# Patient Record
Sex: FEMALE | Race: WHITE | Employment: FULL TIME | URBAN - METROPOLITAN AREA
[De-identification: names, ages, dates, MRNs, and addresses within clinical notes are randomized per-mention and may not be internally consistent; named-entity substitution may affect disease eponyms.]

---

## 2017-06-14 ENCOUNTER — TRANSFERRED RECORDS (OUTPATIENT)
Dept: HEALTH INFORMATION MANAGEMENT | Facility: CLINIC | Age: 57
End: 2017-06-14

## 2017-07-18 NOTE — PROGRESS NOTES
"  SUBJECTIVE:                                                    Kerri Timmons is a 56 year old female who presents to clinic today for the following health issues:    Hypertension Follow-up      Outpatient blood pressures are being checked at home and work.  Results are 150's.    Low Salt Diet: not monitoring salt      Amount of exercise or physical activity: 2-3 days/week for an average of greater than 60 minutes    Problems taking medications regularly: Yes,  side effects    Medication side effects: none    Diet: regular (no restrictions)    Poor diet, lots of processed foods.  Very little exercise.    Has been out of medications for several months.   Denies chest pain, BOGGS, SOB, dizziness or lightheadedness.  No pain radiating to left arm or jaw.  No reflux.      Problem list and histories reviewed & adjusted, as indicated.  Additional history: as documented    Reviewed and updated as needed this visit by clinical staff  Tobacco  Allergies  Meds  Problems  Med Hx  Surg Hx  Fam Hx  Soc Hx        Reviewed and updated as needed this visit by Provider  Allergies  Meds  Problems  Med Hx  Surg Hx  Fam Hx         ROS:  Constitutional, HEENT, cardiovascular, pulmonary, GI, , musculoskeletal, neuro, skin, endocrine and psych systems are negative, except as otherwise noted.      OBJECTIVE:   BP (!) 153/97 (BP Location: Left arm, Patient Position: Sitting, Cuff Size: Adult Regular)  Pulse 62  Temp 95.7  F (35.4  C) (Tympanic)  Resp 16  Ht 5' 3.5\" (1.613 m)  Wt 143 lb 9.6 oz (65.1 kg)  LMP 06/01/2017  SpO2 99%  BMI 25.04 kg/m2  Body mass index is 25.04 kg/(m^2).  GENERAL: healthy, alert and no distress  RESP: lungs clear to auscultation - no rales, rhonchi or wheezes  CV: regular rate and rhythm, normal S1 S2, no S3 or S4, no murmur, click or rub, no peripheral edema and peripheral pulses strong  MS: no gross musculoskeletal defects noted, no edema  SKIN: no suspicious lesions or rashes  PSYCH: " mentation appears normal, affect normal/bright    Diagnostic Test Results:  Results for orders placed or performed in visit on 07/19/17   Comprehensive metabolic panel   Result Value Ref Range    Sodium 139 133 - 144 mmol/L    Potassium 3.6 3.4 - 5.3 mmol/L    Chloride 104 94 - 109 mmol/L    Carbon Dioxide 27 20 - 32 mmol/L    Anion Gap 8 3 - 14 mmol/L    Glucose 125 (H) 70 - 99 mg/dL    Urea Nitrogen 11 7 - 30 mg/dL    Creatinine 0.76 0.52 - 1.04 mg/dL    GFR Estimate 78 >60 mL/min/1.7m2    GFR Estimate If Black >90   GFR Calc   >60 mL/min/1.7m2    Calcium 9.1 8.5 - 10.1 mg/dL    Bilirubin Total 0.8 0.2 - 1.3 mg/dL    Albumin 3.9 3.4 - 5.0 g/dL    Protein Total 7.8 6.8 - 8.8 g/dL    Alkaline Phosphatase 100 40 - 150 U/L    ALT 37 0 - 50 U/L    AST 29 0 - 45 U/L   Albumin Random Urine Quantitative   Result Value Ref Range    Creatinine Urine 277 mg/dL    Albumin Urine mg/L 17 mg/L    Albumin Urine mg/g Cr 5.99 0 - 25 mg/g Cr       ASSESSMENT/PLAN:     Hypertension; slightly worsened   Associated with the following complications:    None   Plan:  Restart toprol at 25 mg and recheck in 1 month.  No changes in the patient's current treatment plan                ICD-10-CM    1. Paroxysmal supraventricular tachycardia (H) I47.1 metoprolol (TOPROL-XL) 25 MG 24 hr tablet     Comprehensive metabolic panel     Albumin Random Urine Quantitative   2. Hypertension goal BP (blood pressure) < 140/90 I10 metoprolol (TOPROL-XL) 25 MG 24 hr tablet     Comprehensive metabolic panel     Albumin Random Urine Quantitative   3. Major depression in complete remission (H) F32.5        See Patient Instructions    KELVIN Gallagher CNP  [facility]

## 2017-07-19 ENCOUNTER — OFFICE VISIT (OUTPATIENT)
Dept: FAMILY MEDICINE | Facility: CLINIC | Age: 57
End: 2017-07-19
Payer: COMMERCIAL

## 2017-07-19 VITALS
HEIGHT: 64 IN | RESPIRATION RATE: 16 BRPM | BODY MASS INDEX: 24.52 KG/M2 | TEMPERATURE: 95.7 F | HEART RATE: 62 BPM | WEIGHT: 143.6 LBS | DIASTOLIC BLOOD PRESSURE: 97 MMHG | SYSTOLIC BLOOD PRESSURE: 153 MMHG | OXYGEN SATURATION: 99 %

## 2017-07-19 DIAGNOSIS — F32.5 MAJOR DEPRESSION IN COMPLETE REMISSION (H): ICD-10-CM

## 2017-07-19 DIAGNOSIS — I47.10 PAROXYSMAL SUPRAVENTRICULAR TACHYCARDIA (H): Primary | ICD-10-CM

## 2017-07-19 DIAGNOSIS — I10 HYPERTENSION GOAL BP (BLOOD PRESSURE) < 140/90: ICD-10-CM

## 2017-07-19 PROBLEM — H26.9 CATARACT, RIGHT EYE: Status: ACTIVE | Noted: 2017-06-17

## 2017-07-19 LAB
ALBUMIN SERPL-MCNC: 3.9 G/DL (ref 3.4–5)
ALP SERPL-CCNC: 100 U/L (ref 40–150)
ALT SERPL W P-5'-P-CCNC: 37 U/L (ref 0–50)
ANION GAP SERPL CALCULATED.3IONS-SCNC: 8 MMOL/L (ref 3–14)
AST SERPL W P-5'-P-CCNC: 29 U/L (ref 0–45)
BILIRUB SERPL-MCNC: 0.8 MG/DL (ref 0.2–1.3)
BUN SERPL-MCNC: 11 MG/DL (ref 7–30)
CALCIUM SERPL-MCNC: 9.1 MG/DL (ref 8.5–10.1)
CHLORIDE SERPL-SCNC: 104 MMOL/L (ref 94–109)
CO2 SERPL-SCNC: 27 MMOL/L (ref 20–32)
CREAT SERPL-MCNC: 0.76 MG/DL (ref 0.52–1.04)
CREAT UR-MCNC: 277 MG/DL
GFR SERPL CREATININE-BSD FRML MDRD: 78 ML/MIN/1.7M2
GLUCOSE SERPL-MCNC: 125 MG/DL (ref 70–99)
MICROALBUMIN UR-MCNC: 17 MG/L
MICROALBUMIN/CREAT UR: 5.99 MG/G CR (ref 0–25)
POTASSIUM SERPL-SCNC: 3.6 MMOL/L (ref 3.4–5.3)
PROT SERPL-MCNC: 7.8 G/DL (ref 6.8–8.8)
SODIUM SERPL-SCNC: 139 MMOL/L (ref 133–144)

## 2017-07-19 PROCEDURE — 36415 COLL VENOUS BLD VENIPUNCTURE: CPT | Performed by: NURSE PRACTITIONER

## 2017-07-19 PROCEDURE — 82043 UR ALBUMIN QUANTITATIVE: CPT | Performed by: NURSE PRACTITIONER

## 2017-07-19 PROCEDURE — 80053 COMPREHEN METABOLIC PANEL: CPT | Performed by: NURSE PRACTITIONER

## 2017-07-19 PROCEDURE — 99213 OFFICE O/P EST LOW 20 MIN: CPT | Performed by: NURSE PRACTITIONER

## 2017-07-19 RX ORDER — METOPROLOL SUCCINATE 25 MG/1
25 TABLET, EXTENDED RELEASE ORAL DAILY
Qty: 30 TABLET | Refills: 0 | Status: SHIPPED | OUTPATIENT
Start: 2017-07-19 | End: 2017-07-31

## 2017-07-19 NOTE — MR AVS SNAPSHOT
"              After Visit Summary   7/19/2017    Kerri Timmons    MRN: [id number]           Patient Information     Date Of Birth          1960        Visit Information        Provider Department      7/19/2017 10:20 AM Ying Holder APRN CNP [facility]        Today's Diagnoses     Paroxysmal supraventricular tachycardia (H)    -  1    Hypertension goal BP (blood pressure) < 140/90        Major depression in complete remission (H)           Follow-ups after your visit        Who to contact     If you have questions or need follow up information about today's clinic visit or your schedule please contact [facility] directly at [phone].  Normal or non-critical lab and imaging results will be communicated to you by [employer], letter or phone within 4 business days after the clinic has received the results. If you do not hear from us within 7 days, please contact the clinic through [employer] or phone. If you have a critical or abnormal lab result, we will notify you by phone as soon as possible.  Submit refill requests through [employer] or call your pharmacy and they will forward the refill request to us. Please allow 3 business days for your refill to be completed.          Additional Information About Your Visit        MyChart Information     [employer] gives you secure access to your electronic health record. If you see a primary care provider, you can also send messages to your care team and make appointments. If you have questions, please call your primary care clinic.  If you do not have a primary care provider, please call [phone] and they will assist you.        Care EveryWhere ID     This is your Care EveryWhere ID. This could be used by other organizations to access your [place] medical records  [id number]        Your Vitals Were     Pulse Temperature Respirations Height Last Period Pulse Oximetry    62 95.7  F (35.4  C) (Tympanic) 16 5' 3.5\" (1.613 m) " 06/01/2017 99%    BMI (Body Mass Index)                   25.04 kg/m2            Blood Pressure from Last 3 Encounters:   07/19/17 (!) 153/97   08/16/16 (!) 143/93   10/29/15 (!) 143/114    Weight from Last 3 Encounters:   07/19/17 143 lb 9.6 oz (65.1 kg)   08/16/16 146 lb (66.2 kg)   10/29/15 140 lb (63.5 kg)              We Performed the Following     Albumin Random Urine Quantitative     Comprehensive metabolic panel          Today's Medication Changes          These changes are accurate as of: 7/19/17 11:59 PM.  If you have any questions, ask your nurse or doctor.               Start taking these medicines.        Dose/Directions    metoprolol 25 MG 24 hr tablet   Commonly known as:  TOPROL-XL   Used for:  Paroxysmal supraventricular tachycardia (H), Hypertension goal BP (blood pressure) < 140/90   Started by:  Ying Holder APRN CNP        Dose:  25 mg   Take 1 tablet (25 mg) by mouth daily   Quantity:  30 tablet   Refills:  0            Where to get your medicines      These medications were sent to [facility]/pharmacy #[id number] - [place], [place] - [address], [place] [address]     Phone:  [phone]     metoprolol 25 MG 24 hr tablet                Primary Care Provider Office Phone # Fax #    Mira DAGO Draper [phone]       [facility] [address] [facility] [address]        Equal Access to Services     ACE SWAN AH: Hadii amirah ku hadasho Soomaali, waaxda luqadaha, qaybta kaalmada adeegyada, waxay jules romero adecameron daniel. So [facility] [phone].    ATENCIÓN: Si habla español, tiene a davis disposición servicios gratuitos de asistencia lingüística. Llame al [phone].    We comply with applicable federal civil rights laws and [place] laws. We do not discriminate on the basis of race, color, national origin, age, disability sex, sexual orientation or gender identity.            Thank you!     Thank you for choosing  [facility]  for your care. Our goal is always to provide you with excellent care. Hearing back from our patients is one way we can continue to improve our services. Please take a few minutes to complete the written survey that you may receive in the mail after your visit with us. Thank you!             Your Updated Medication List - Protect others around you: Learn how to safely use, store and throw away your medicines at www.disposemymeds.org.          This list is accurate as of: 7/19/17 11:59 PM.  Always use your most recent med list.                   Brand Name Dispense Instructions for use Diagnosis    atenolol 25 MG tablet    TENORMIN    90 tablet    Take 1 tablet (25 mg) by mouth daily    Paroxysmal supraventricular tachycardia (H)       calcium carbonate 1250 MG tablet    OS-FADY 500 mg [place]. Ca    100 tablet    Take 1 tablet by mouth daily.        DULoxetine 60 MG EC capsule    CYMBALTA    90 capsule    Take 1 capsule (60 mg) by mouth daily    Major depression in complete remission (H)       metoprolol 25 MG 24 hr tablet    TOPROL-XL    30 tablet    Take 1 tablet (25 mg) by mouth daily    Paroxysmal supraventricular tachycardia (H), Hypertension goal BP (blood pressure) < 140/90       Multi-vitamin Tabs tablet   Generic drug:  multivitamin, therapeutic with minerals     100    as directed    Routine general medical examination at a health care facility

## 2017-07-19 NOTE — NURSING NOTE
"Chief Complaint   Patient presents with     Hypertension       Initial BP (!) 153/97 (BP Location: Left arm, Patient Position: Sitting, Cuff Size: Adult Regular)  Pulse 62  Temp 95.7  F (35.4  C) (Tympanic)  Resp 16  Ht 5' 3.5\" (1.613 m)  Wt 143 lb 9.6 oz (65.1 kg)  LMP 06/01/2017  SpO2 99%  BMI 25.04 kg/m2 Estimated body mass index is 25.04 kg/(m^2) as calculated from the following:    Height as of this encounter: 5' 3.5\" (1.613 m).    Weight as of this encounter: 143 lb 9.6 oz (65.1 kg).  Medication Reconciliation: complete     sma Esteban  "

## 2017-07-25 ENCOUNTER — TELEPHONE (OUTPATIENT)
Dept: FAMILY MEDICINE | Facility: CLINIC | Age: 57
End: 2017-07-25

## 2017-07-26 ASSESSMENT — PATIENT HEALTH QUESTIONNAIRE - PHQ9: SUM OF ALL RESPONSES TO PHQ QUESTIONS 1-9: 0

## 2017-07-27 ENCOUNTER — MYC MEDICAL ADVICE (OUTPATIENT)
Dept: FAMILY MEDICINE | Facility: CLINIC | Age: 57
End: 2017-07-27

## 2017-07-27 DIAGNOSIS — I10 HYPERTENSION GOAL BP (BLOOD PRESSURE) < 140/90: ICD-10-CM

## 2017-07-27 DIAGNOSIS — I47.10 PAROXYSMAL SUPRAVENTRICULAR TACHYCARDIA (H): ICD-10-CM

## 2017-07-28 ENCOUNTER — TELEPHONE (OUTPATIENT)
Dept: FAMILY MEDICINE | Facility: CLINIC | Age: 57
End: 2017-07-28

## 2017-07-28 NOTE — TELEPHONE ENCOUNTER
Huddled with provider SB and patient really needs to come in for a BP check and evaluation of BP meds.  LMOM strongly encouraging her to come in as she is at a higher risk for CV sx.  Does have an appt. But not sure if she will keep it.  Await to hear from her.   Gricelda Berry RN

## 2017-07-28 NOTE — TELEPHONE ENCOUNTER
Reason for Call:  Medication or medication refill:    Do you use a [facility]?  Name of the pharmacy and phone number for the current request:  [facility]/PHARMACY #[id number] - [place], [place] - [address]    Name of the medication requested: metoprolol (TOPROL-XL) 25 MG 24 hr tablet    Other request: Patient is requesting a call back to know if she is able to take one and a half tablets this morning because one is not helping, or if she should take one this morning and a half tablet this evening. Please follow up with clarity. Thanks!    Can we leave a detailed message on this number? YES    Phone number patient can be reached at: Home number on file [phone] (home)    Best Time: Any    Call taken on 7/28/2017 at 10:03 AM by Margaret Pappas

## 2017-07-28 NOTE — TELEPHONE ENCOUNTER
Attempted again to reach patient and LMOM. Unable to reach right now to confirm if she can come to appt. Strongly encouraged her.   Gricelda Berry RN

## 2017-07-31 RX ORDER — METOPROLOL SUCCINATE 50 MG/1
50 TABLET, EXTENDED RELEASE ORAL DAILY
Qty: 30 TABLET | Refills: 0 | Status: SHIPPED | OUTPATIENT
Start: 2017-07-31 | End: 2017-08-22 | Stop reason: ALTCHOICE

## 2017-08-14 ENCOUNTER — OFFICE VISIT (OUTPATIENT)
Dept: FAMILY MEDICINE | Facility: CLINIC | Age: 57
End: 2017-08-14
Payer: COMMERCIAL

## 2017-08-14 VITALS
DIASTOLIC BLOOD PRESSURE: 84 MMHG | RESPIRATION RATE: 18 BRPM | WEIGHT: 148 LBS | HEART RATE: 67 BPM | SYSTOLIC BLOOD PRESSURE: 127 MMHG | BODY MASS INDEX: 25.81 KG/M2 | OXYGEN SATURATION: 96 % | TEMPERATURE: 98.1 F

## 2017-08-14 DIAGNOSIS — I10 HYPERTENSION GOAL BP (BLOOD PRESSURE) < 140/90: Primary | ICD-10-CM

## 2017-08-14 PROCEDURE — 99213 OFFICE O/P EST LOW 20 MIN: CPT | Performed by: FAMILY MEDICINE

## 2017-08-14 NOTE — MR AVS SNAPSHOT
After Visit Summary   8/14/2017    Kerri Timmons    MRN: [id number]           Patient Information     Date Of Birth          1960        Visit Information        Provider Department      8/14/2017 11:45 AM Master Angeles MD [facility]        Today's Diagnoses     Hypertension goal BP (blood pressure) < 140/90    -  1      Care Instructions    Let's plan on checking BP 2-3 times a month, can be a nurse visit. Check BP with home monitor and we can compare this to our bp cuffs by bringing it in to your visit with Jasmine.  I do not recommend a change today.          Follow-ups after your visit        Your next 10 appointments already scheduled     Aug 22, 2017  8:30 AM CDT   Office Visit with Mira Draper NP   [facility] ([facility]    [address] [place] [id number] [phone]           Bring a current list of meds and any records pertaining to this visit. For Physicals, please bring immunization records and any forms needing to be filled out. Please arrive 10 minutes early to complete paperwork.              Who to contact     If you have questions or need follow up information about today's clinic visit or your schedule please contact [facility] directly at [phone].  Normal or non-critical lab and imaging results will be communicated to you by MyChart, letter or phone within 4 business days after the clinic has received the results. If you do not hear from us within 7 days, please contact the clinic through MyChart or phone. If you have a critical or abnormal lab result, we will notify you by phone as soon as possible.  Submit refill requests through [employer] or call your pharmacy and they will forward the refill request to us. Please allow 3 business days for your refill to be completed.          Additional Information About Your Visit        MyChart Information     [employer] gives  you secure access to your electronic health record. If you see a primary care provider, you can also send messages to your care team and make appointments. If you have questions, please call your primary care clinic.  If you do not have a primary care provider, please call [phone] and they will assist you.        Care EveryWhere ID     This is your Care EveryWhere ID. This could be used by other organizations to access your [place] medical records  [id number]        Your Vitals Were     Pulse Temperature Respirations Last Period Pulse Oximetry BMI (Body Mass Index)    67 98.1  F (36.7  C) (Oral) 18 06/01/2017 96% 25.81 kg/m2       Blood Pressure from Last 3 Encounters:   08/14/17 127/84   07/19/17 (!) 153/97   08/16/16 (!) 143/93    Weight from Last 3 Encounters:   08/14/17 148 lb (67.1 kg)   07/19/17 143 lb 9.6 oz (65.1 kg)   08/16/16 146 lb (66.2 kg)              Today, you had the following     No orders found for display         Today's Medication Changes          These changes are accurate as of: 8/14/17 12:29 PM.  If you have any questions, ask your nurse or doctor.               Start taking these medicines.        Dose/Directions    order for DME   Used for:  Hypertension goal BP (blood pressure) < 140/90   Started by:  Master Angeles MD        Home BP monitor   Quantity:  1 Units   Refills:  0            Where to get your medicines      Some of these will need a paper prescription and others can be bought over the counter.  Ask your nurse if you have questions.     Bring a paper prescription for each of these medications     order for DME                Primary Care Provider Office Phone # Fax #    Mira Draper [phone] [address] [place] [facility] [address]        Equal Access to Services     ACE SWAN AH: Deepika Tse, waaxda luqadaha, qaybta kaalmadonato bowie, stacey daniel. So [facility]  [phone].    ATENCIÓN: Si shari guzmán, tiene a davis disposición servicios gratuitos de asistencia lingüística. Autunm mcclendon [phone].    We comply with applicable federal civil rights laws and [place] laws. We do not discriminate on the basis of race, color, national origin, age, disability sex, sexual orientation or gender identity.            Thank you!     Thank you for choosing [facility]  for your care. Our goal is always to provide you with excellent care. Hearing back from our patients is one way we can continue to improve our services. Please take a few minutes to complete the written survey that you may receive in the mail after your visit with us. Thank you!             Your Updated Medication List - Protect others around you: Learn how to safely use, store and throw away your medicines at www.disposemymeds.org.          This list is accurate as of: 8/14/17 12:29 PM.  Always use your most recent med list.                   Brand Name Dispense Instructions for use Diagnosis    calcium carbonate 1250 MG tablet    OS-FADY 500 mg [place]. Ca    100 tablet    Take 1 tablet by mouth daily.        DULoxetine 60 MG EC capsule    CYMBALTA    90 capsule    Take 1 capsule (60 mg) by mouth daily    Major depression in complete remission (H)       metoprolol 50 MG 24 hr tablet    TOPROL-XL    30 tablet    Take 1 tablet (50 mg) by mouth daily    Paroxysmal supraventricular tachycardia (H), Hypertension goal BP (blood pressure) < 140/90       Multi-vitamin Tabs tablet   Generic drug:  multivitamin, therapeutic with minerals     100    as directed    Routine general medical examination at a health care facility       order for DME     1 Units    Home BP monitor    Hypertension goal BP (blood pressure) < 140/90

## 2017-08-14 NOTE — PATIENT INSTRUCTIONS
Let's plan on checking BP 2-3 times a month, can be a nurse visit. Check BP with home monitor and we can compare this to our bp cuffs by bringing it in to your visit with Jasmine.  I do not recommend a change today.

## 2017-08-14 NOTE — PROGRESS NOTES
SUBJECTIVE:                                                    Kerri Timmons is a 56 year old female who presents to clinic today for the following health issues:      Blood Pressure Follow-up      Outpatient blood pressures are being checked at work.  Results are 160/98.    Low Salt Diet: no added salt        Amount of exercise or physical activity: 6-7 days/week for an average of 15-30 minutes    Problems taking medications regularly: No    Medication side effects: none  Diet: regular (no restrictions)    She has been checking BP at work and getting 160/98.    Atenolol was not available so switched to metoprolol 25mg increased 50mg XR once daily.     EXAM:  /84 (BP Location: Left arm)  Pulse 67  Temp 98.1  F (36.7  C) (Oral)  Resp 18  Wt 148 lb (67.1 kg)  LMP 06/01/2017  SpO2 96%  BMI 25.81 kg/m2  Constitutional: Healthy, alert, no distress   Cardiovascular: RRR. No murmurs       ASSESSMENT    ICD-10-CM    1. Hypertension goal BP (blood pressure) < 140/90 I10 order for DME      Plan:  Let's plan on checking BP 2-3 times a month, can be a nurse visit. Check BP with home monitor and we can compare this to our bp cuffs by bringing it in to your visit with Jasmine.  I do not recommend a change today.    Master See MD  Family Medicine Physician

## 2017-08-14 NOTE — NURSING NOTE
"No chief complaint on file.      Initial /86 (BP Location: Left arm)  Pulse 67  Temp 98.1  F (36.7  C) (Oral)  Resp 18  Wt 148 lb (67.1 kg)  LMP 06/01/2017  SpO2 96%  BMI 25.81 kg/m2 Estimated body mass index is 25.81 kg/(m^2) as calculated from the following:    Height as of 7/19/17: 5' 3.5\" (1.613 m).    Weight as of this encounter: 148 lb (67.1 kg).  Medication Reconciliation: complete     Itzel Zayas CMA      "

## 2017-08-16 ENCOUNTER — MYC MEDICAL ADVICE (OUTPATIENT)
Dept: FAMILY MEDICINE | Facility: CLINIC | Age: 57
End: 2017-08-16

## 2017-08-20 ENCOUNTER — NURSE TRIAGE (OUTPATIENT)
Dept: NURSING | Facility: CLINIC | Age: 57
End: 2017-08-20

## 2017-08-21 NOTE — TELEPHONE ENCOUNTER
Reason for Disposition    BP  >= 180/110    Additional Information    Negative: Sounds like a life-threatening emergency to the triager    Negative: [1] Chest pain AND [2] took nitrogylcerin AND [3] pain was not relieved    Negative: Severe difficulty breathing (e.g., struggling for each breath, speaks in single words)    Negative: Difficult to awaken or acting confused  (e.g., disoriented, slurred speech)    Negative: [1] Weakness of the face, arm or leg on one side of the body AND [2] new onset    Negative: [1] Numbness (i.e., loss of sensation) of the face, arm or leg on one side of the body AND [2] new onset    Negative: [1] Chest pain lasts > 5 minutes AND [2] history of heart disease  (i.e., heart attack, bypass surgery, angina, angioplasty, CHF)    Negative: [1] BP  >= 160 / 100 AND [2] cardiac or neurologic symptoms    (e.g., chest pain, difficulty breathing, unsteady gait, blurred vision)    Negative: [1] Pregnant AND [2] new hand or face swelling    Negative: [1] Pregnant > 20 weeks AND [2] BP  >= 140/90    Negative: [1] BP  >= 180/110 AND [2] missed most recent dose of blood pressure medication    Protocols used: HIGH BLOOD PRESSURE-ADULT-

## 2017-08-22 ENCOUNTER — OFFICE VISIT (OUTPATIENT)
Dept: FAMILY MEDICINE | Facility: CLINIC | Age: 57
End: 2017-08-22
Payer: COMMERCIAL

## 2017-08-22 VITALS
TEMPERATURE: 98.2 F | OXYGEN SATURATION: 99 % | BODY MASS INDEX: 24.92 KG/M2 | WEIGHT: 146 LBS | HEIGHT: 64 IN | RESPIRATION RATE: 18 BRPM | SYSTOLIC BLOOD PRESSURE: 146 MMHG | HEART RATE: 77 BPM | DIASTOLIC BLOOD PRESSURE: 99 MMHG

## 2017-08-22 DIAGNOSIS — Z23 NEED FOR VACCINATION: ICD-10-CM

## 2017-08-22 DIAGNOSIS — I47.10 PAROXYSMAL SUPRAVENTRICULAR TACHYCARDIA (H): ICD-10-CM

## 2017-08-22 DIAGNOSIS — R53.83 FATIGUE, UNSPECIFIED TYPE: ICD-10-CM

## 2017-08-22 DIAGNOSIS — R79.89 ELEVATED TSH: ICD-10-CM

## 2017-08-22 DIAGNOSIS — Z00.00 ROUTINE GENERAL MEDICAL EXAMINATION AT A HEALTH CARE FACILITY: Primary | ICD-10-CM

## 2017-08-22 DIAGNOSIS — F33.1 MAJOR DEPRESSIVE DISORDER, RECURRENT EPISODE, MODERATE (H): ICD-10-CM

## 2017-08-22 DIAGNOSIS — R73.09 ELEVATED GLUCOSE: ICD-10-CM

## 2017-08-22 DIAGNOSIS — L98.9 SKIN LESION: ICD-10-CM

## 2017-08-22 DIAGNOSIS — I10 HYPERTENSION GOAL BP (BLOOD PRESSURE) < 140/90: ICD-10-CM

## 2017-08-22 DIAGNOSIS — R10.12 ABDOMINAL PAIN, LEFT UPPER QUADRANT: ICD-10-CM

## 2017-08-22 LAB
HBA1C MFR BLD: 5.6 % (ref 4.3–6)
T4 FREE SERPL-MCNC: 0.78 NG/DL (ref 0.76–1.46)
TSH SERPL DL<=0.005 MIU/L-ACNC: 5.62 MU/L (ref 0.4–4)

## 2017-08-22 PROCEDURE — 84439 ASSAY OF FREE THYROXINE: CPT | Performed by: NURSE PRACTITIONER

## 2017-08-22 PROCEDURE — 83036 HEMOGLOBIN GLYCOSYLATED A1C: CPT | Performed by: NURSE PRACTITIONER

## 2017-08-22 PROCEDURE — 99396 PREV VISIT EST AGE 40-64: CPT | Mod: 25 | Performed by: NURSE PRACTITIONER

## 2017-08-22 PROCEDURE — 36415 COLL VENOUS BLD VENIPUNCTURE: CPT | Performed by: NURSE PRACTITIONER

## 2017-08-22 PROCEDURE — 82306 VITAMIN D 25 HYDROXY: CPT | Performed by: NURSE PRACTITIONER

## 2017-08-22 PROCEDURE — 90715 TDAP VACCINE 7 YRS/> IM: CPT | Performed by: NURSE PRACTITIONER

## 2017-08-22 PROCEDURE — 99214 OFFICE O/P EST MOD 30 MIN: CPT | Mod: 25 | Performed by: NURSE PRACTITIONER

## 2017-08-22 PROCEDURE — 90471 IMMUNIZATION ADMIN: CPT | Performed by: NURSE PRACTITIONER

## 2017-08-22 PROCEDURE — 84443 ASSAY THYROID STIM HORMONE: CPT | Performed by: NURSE PRACTITIONER

## 2017-08-22 RX ORDER — DULOXETIN HYDROCHLORIDE 30 MG/1
90 CAPSULE, DELAYED RELEASE ORAL DAILY
Qty: 270 CAPSULE | Refills: 3 | Status: SHIPPED | OUTPATIENT
Start: 2017-08-22 | End: 2018-05-09

## 2017-08-22 RX ORDER — VERAPAMIL HYDROCHLORIDE 180 MG/1
180 TABLET, EXTENDED RELEASE ORAL DAILY
Qty: 90 TABLET | Status: SHIPPED | OUTPATIENT
Start: 2017-08-22 | End: 2017-09-04

## 2017-08-22 RX ORDER — MUPIROCIN 20 MG/G
OINTMENT TOPICAL 2 TIMES DAILY
Qty: 15 G | Refills: 0 | Status: SHIPPED | OUTPATIENT
Start: 2017-08-22 | End: 2017-08-29

## 2017-08-22 ASSESSMENT — ENCOUNTER SYMPTOMS: FATIGUE: 1

## 2017-08-22 NOTE — NURSING NOTE
"Chief Complaint   Patient presents with     Physical     Hypertension     elevated BP      Fatigue     Medication Question     Discuss supplements       Initial BP (!) 146/99  Pulse 77  Temp 98.2  F (36.8  C) (Oral)  Resp 18  Ht 5' 3.5\" (1.613 m)  Wt 146 lb (66.2 kg)  SpO2 99%  BMI 25.46 kg/m2 Estimated body mass index is 25.46 kg/(m^2) as calculated from the following:    Height as of this encounter: 5' 3.5\" (1.613 m).    Weight as of this encounter: 146 lb (66.2 kg).  Medication Reconciliation: complete     Niharika Alonso MA      "

## 2017-08-22 NOTE — PATIENT INSTRUCTIONS
Schedule a mammogram [phone]    Preventive Health Recommendations  Female Ages 50 - 64    Yearly exam: See your health care provider every year in order to  o Review health changes.   o Discuss preventive care.    o Review your medicines if your doctor has prescribed any.      Get a Pap test every three years (unless you have an abnormal result and your provider advises testing more often).    If you get Pap tests with HPV test, you only need to test every 5 years, unless you have an abnormal result.     You do not need a Pap test if your uterus was removed (hysterectomy) and you have not had cancer.    You should be tested each year for STDs (sexually transmitted diseases) if you're at risk.     Have a mammogram every 1 to 2 years.    Have a colonoscopy at age 50, or have a yearly FIT test (stool test). These exams screen for colon cancer.      Have a cholesterol test every 5 years, or more often if advised.    Have a diabetes test (fasting glucose) every three years. If you are at risk for diabetes, you should have this test more often.     If you are at risk for osteoporosis (brittle bone disease), think about having a bone density scan (DEXA).    Shots: Get a flu shot each year. Get a tetanus shot every 10 years.    Nutrition:     Eat at least 5 servings of fruits and vegetables each day.    Eat whole-grain bread, whole-wheat pasta and brown rice instead of white grains and rice.    Talk to your provider about Calcium and Vitamin D.     Lifestyle    Exercise at least 150 minutes a week (30 minutes a day, 5 days a week). This will help you control your weight and prevent disease.    Limit alcohol to one drink per day.    No smoking.     Wear sunscreen to prevent skin cancer.     See your dentist every six months for an exam and cleaning.    See your eye doctor every 1 to 2 years.

## 2017-08-22 NOTE — NURSING NOTE
Prior to injection verified patient identity using patient's name and date of birth.    Screening Questionnaire for Adult Immunization    Are you sick today?   No   Do you have allergies to medications, food, a vaccine component or latex?   No   Have you ever had a serious reaction after receiving a vaccination?   No   Do you have a long-term health problem with heart disease, lung disease, asthma, kidney disease, metabolic disease (e.g. diabetes), anemia, or other blood disorder?   No   Do you have cancer, leukemia, HIV/AIDS, or any other immune system problem?   No   In the past 3 months, have you taken medications that affect  your immune system, such as prednisone, other steroids, or anticancer drugs; drugs for the treatment of rheumatoid arthritis, Crohn s disease, or psoriasis; or have you had radiation treatments?   No   Have you had a seizure, or a brain or other nervous system problem?   No   During the past year, have you received a transfusion of blood or blood     products, or been given immune (gamma) globulin or antiviral drug?   No   For women: Are you pregnant or is there a chance you could become        pregnant during the next month?   No   Have you received any vaccinations in the past 4 weeks?   No     Immunization questionnaire answers were all negative.        Per orders of Mira Draper, injection of Adacel given by Niharika Alonso. Patient instructed to remain in clinic for 15 minutes afterwards, and to report any adverse reaction to me immediately.       Screening performed by Niharika Alonso on 8/22/2017 at 10:20 AM.

## 2017-08-22 NOTE — PROGRESS NOTES
SUBJECTIVE:   CC: Kerri Timmons is an 56 year old woman who presents for preventive health visit.     Physical   Annual:     Getting at least 3 servings of Calcium per day::  Yes    Bi-annual eye exam::  Yes    Dental care twice a year::  Yes    Sleep apnea or symptoms of sleep apnea::  Daytime drowsiness    Frequency of exercise::  2-3 days/week    Duration of exercise::  15-30 minutes    Taking medications regularly::  Yes    Medication side effects::  None    Additional concerns today::  YES  Hypertension   Frequency of exercise::  2-3 days/week  Duration of exercise::  15-30 minutes  Taking medications regularly::  Yes  Medication side effects::  None  Additional concerns today::  YES  Fatigue   Associated symptoms include fatigue.   Feels like staying in bed; some anhedonia.  More stress at work and home.  Her boyfriend's son is living with them, very stressful but he will be moving out in the next month.   She does wonder about her Metoprolol causing some fatigue.  She has been checking her blood pressure at home and it has been elevated.      She continues to have intermittent LUQ pain, described as an ache that radiates into her back for the past 6-8 months.  She will occasionally have a sharper pain that requires her to hold her abdomen until the pain passes.  She denies any nausea or vomiting, fevers.  Her BMs are not regular - she has frequent BMs.  She did have a colonoscopy 10/15.  An abdominal ultrasound was ordered last year, but she didn't go.     She has a sore in her left ear that she feels like is a cyst that will intermittently become tender and drain.          Today's PHQ-2 Score:   PHQ-2 ( 1999 Pfizer) 8/22/2017   Q1: Little interest or pleasure in doing things 0   Q2: Feeling down, depressed or hopeless 0   PHQ-2 Score 0   Q1: Little interest or pleasure in doing things Not at all   Q2: Feeling down, depressed or hopeless Not at all   PHQ-2 Score 0       Abuse: Current or Past(Physical,  "Sexual or Emotional)- No  Do you feel safe in your environment - Yes    Social History   Substance Use Topics     Smoking status: Light Tobacco Smoker     Types: Cigarettes     Smokeless tobacco: Never Used      Comment: 5 per week      Alcohol use Yes      Comment: 6 drinks per week      The patient does not drink >3 drinks per day nor >7 drinks per week.    Reviewed orders with patient.  Reviewed health maintenance and updated orders accordingly - Yes          Pertinent mammograms are reviewed under the imaging tab.  History of abnormal Pap smear: NO - age 30- 65 PAP every 3 years recommended    Reviewed and updated as needed this visit by clinical staff  Tobacco  Allergies  Meds  Med Hx  Surg Hx  Fam Hx  Soc Hx        Reviewed and updated as needed this visit by Provider              ROS:  C: NEGATIVE for fever, chills, change in weight  I: NEGATIVE for worrisome rashes, moles or lesions  E: NEGATIVE for vision changes or irritation  ENT: NEGATIVE for ear, mouth and throat problems  R: NEGATIVE for significant cough or SOB  B: NEGATIVE for masses, tenderness or discharge  CV: NEGATIVE for chest pain, palpitations or peripheral edema  GI: NEGATIVE for nausea, abdominal pain, heartburn, or change in bowel habits  : NEGATIVE for unusual urinary or vaginal symptoms. No vaginal bleeding.  M: NEGATIVE for significant arthralgias or myalgia  N: NEGATIVE for weakness, dizziness or paresthesias  PSYCHIATRIC: see HPI      OBJECTIVE:   Ht 5' 3.5\" (1.613 m)  Wt 146 lb (66.2 kg)  BMI 25.46 kg/m2  EXAM:  GENERAL: healthy, alert and no distress  EYES: Eyes grossly normal to inspection, PERRL and conjunctivae and sclerae normal  HENT: ear canals and TM's normal, nose and mouth without ulcers or lesions  NECK: no adenopathy, no asymmetry, masses, or scars and thyroid normal to palpation  RESP: lungs clear to auscultation - no rales, rhonchi or wheezes  BREAST: normal without masses, tenderness or nipple discharge and no " palpable axillary masses or adenopathy  CV: regular rate and rhythm, normal S1 S2, no S3 or S4, no murmur, click or rub, no peripheral edema and peripheral pulses strong  ABDOMEN: soft, nontender, no hepatosplenomegaly, no masses and bowel sounds normal  MS: no gross musculoskeletal defects noted, no edema  SKIN: cyst left pinna  NEURO: Normal strength and tone, mentation intact and speech normal  PSYCH: mentation appears normal, affect slightly flattened    ASSESSMENT/PLAN:   1. Routine general medical examination at a health care facility      2. Hypertension goal BP (blood pressure) < 140/90  Not at goal  Will change to Verapamil to see if this helps with the fatigue, blood pressure and SVT.  Discussed the use and indication of this medication as well as potential side effects.   Follow up in one month.   - verapamil (CALAN-SR) 180 MG CR tablet; Take 1 tablet (180 mg) by mouth daily  Dispense: 90 tablet; Refill: PRN    3. Major depressive disorder, recurrent episode, moderate (H)  Worsening  Increase to 90 mg and follow up in one month.   - DULoxetine (CYMBALTA) 30 MG EC capsule; Take 3 capsules (90 mg) by mouth daily  Dispense: 270 capsule; Refill: 3    4. Paroxysmal supraventricular tachycardia (H)  See above.   - verapamil (CALAN-SR) 180 MG CR tablet; Take 1 tablet (180 mg) by mouth daily  Dispense: 90 tablet; Refill: PRN    5. Elevated glucose    - Hemoglobin A1c    6. Fatigue, unspecified type  May be related to betablocker, depression  - TSH with free T4 reflex  - Vitamin D Deficiency    7. Need for vaccination    - TDAP VACCINE (ADACEL)    Skin Lesion  Plan: Bactroban BID X 7 days.  If no improvement, see derm.    Chronic LUQ abdominal pain  Plan: Schedule CT scan.     COUNSELING:  Reviewed preventive health counseling, as reflected in patient instructions         reports that she has been smoking Cigarettes.  She has never used smokeless tobacco.  Tobacco Cessation Action Plan: Information offered:  "Patient not interested at this time  Estimated body mass index is 25.46 kg/(m^2) as calculated from the following:    Height as of this encounter: 5' 3.5\" (1.613 m).    Weight as of this encounter: 146 lb (66.2 kg).   Weight management plan: Discussed healthy diet and exercise guidelines and patient will follow up in 12 months in clinic to re-evaluate.    Counseling Resources:  ATP IV Guidelines  Pooled Cohorts Equation Calculator  Breast Cancer Risk Calculator  FRAX Risk Assessment  ICSI Preventive Guidelines  Dietary Guidelines for Americans, 2010  USDA's MyPlate  ASA Prophylaxis  Lung CA Screening    Mira Draper NP  [facility]  Answers for HPI/ROS submitted by the patient on 8/22/2017   PHQ-2 Score: 0    "

## 2017-08-23 LAB — DEPRECATED CALCIDIOL+CALCIFEROL SERPL-MC: 32 UG/L (ref 20–75)

## 2017-08-29 ENCOUNTER — MYC MEDICAL ADVICE (OUTPATIENT)
Dept: FAMILY MEDICINE | Facility: CLINIC | Age: 57
End: 2017-08-29

## 2017-08-29 DIAGNOSIS — I47.10 PAROXYSMAL SUPRAVENTRICULAR TACHYCARDIA (H): ICD-10-CM

## 2017-08-29 DIAGNOSIS — I10 HYPERTENSION GOAL BP (BLOOD PRESSURE) < 140/90: ICD-10-CM

## 2017-08-29 NOTE — TELEPHONE ENCOUNTER
ARNULFO covering for ALKA review:  Is it okay that the patient is increase her Verapamil dose up to 270 mg from 180 mg.  It seems to be more effective for keeping BP under control?  Recent CMP was normal kidney and liver tests.  See her my chart message below.   Gricelda Berry RN

## 2017-09-04 RX ORDER — VERAPAMIL HYDROCHLORIDE 240 MG/1
240 TABLET, FILM COATED, EXTENDED RELEASE ORAL DAILY
Qty: 90 TABLET | Refills: 1 | Status: SHIPPED | OUTPATIENT
Start: 2017-09-04 | End: 2018-04-11

## 2017-09-05 NOTE — TELEPHONE ENCOUNTER
Patient informed of provider instruction and Rx ordered, patient aware.    Thanks! Perla Madrigal RN

## 2017-09-05 NOTE — TELEPHONE ENCOUNTER
Relayed [employer] message to pt, pt will  rx at pharm and begin new dosage.    Liset Rush  Patient Representative

## 2017-09-06 ENCOUNTER — MYC MEDICAL ADVICE (OUTPATIENT)
Dept: FAMILY MEDICINE | Facility: CLINIC | Age: 57
End: 2017-09-06

## 2017-09-06 NOTE — TELEPHONE ENCOUNTER
I must have read the message incorrectly.  If she still has some of the 180 mg tablets, she could take 1/2 tablet plus the new 240 mg.

## 2017-09-06 NOTE — TELEPHONE ENCOUNTER
Jasmine Draper CNP  Just to clarify  Pt taking 1/2 of 180mg(90mg)  Plus the 240  So 330mg?    Thanks!     Kari Lawrence RN

## 2017-09-07 ENCOUNTER — MYC MEDICAL ADVICE (OUTPATIENT)
Dept: FAMILY MEDICINE | Facility: CLINIC | Age: 57
End: 2017-09-07

## 2017-09-30 ENCOUNTER — HEALTH MAINTENANCE LETTER (OUTPATIENT)
Age: 57
End: 2017-09-30

## 2018-04-10 ENCOUNTER — TELEPHONE (OUTPATIENT)
Dept: FAMILY MEDICINE | Facility: CLINIC | Age: 58
End: 2018-04-10

## 2018-04-11 DIAGNOSIS — I47.10 PAROXYSMAL SUPRAVENTRICULAR TACHYCARDIA (H): ICD-10-CM

## 2018-04-11 DIAGNOSIS — I10 HYPERTENSION GOAL BP (BLOOD PRESSURE) < 140/90: ICD-10-CM

## 2018-04-11 NOTE — TELEPHONE ENCOUNTER
The patient will be out of her medication 4/13/18 and seeing PCP 5/1/18 and is requesting a refill to get her through until her appointment 5/1/18.

## 2018-04-12 RX ORDER — VERAPAMIL HYDROCHLORIDE 240 MG/1
240 TABLET, FILM COATED, EXTENDED RELEASE ORAL DAILY
Qty: 30 TABLET | Refills: 0 | Status: SHIPPED | OUTPATIENT
Start: 2018-04-12 | End: 2018-05-09

## 2018-04-12 NOTE — TELEPHONE ENCOUNTER
Routing refill request to provider for review/approval because:  out of range:  Blood pressure- pt does have upcoming appt

## 2018-04-12 NOTE — TELEPHONE ENCOUNTER
"Requested Prescriptions   Pending Prescriptions Disp Refills     verapamil (CALAN-SR) 240 MG CR tablet 90 tablet 1     Sig: Take 1 tablet (240 mg) by mouth daily    Calcium Channel Blockers Protocol  Failed    4/11/2018 12:41 PM       Failed - Blood pressure under 140/90 in past 12 months    BP Readings from Last 3 Encounters:   08/22/17 (!) 146/99   08/14/17 127/84   07/19/17 (!) 153/97                Passed - Normal ALT in past 12 months    Recent Labs   Lab Test  07/19/17   1047   ALT  37            Passed - Recent (12 mo) or future (30 days) visit within the authorizing provider's specialty    Patient had office visit in the last 12 months or has a visit in the next 30 days with authorizing provider or within the authorizing provider's specialty.  See \"Patient Info\" tab in inbasket, or \"Choose Columns\" in Meds & Orders section of the refill encounter.           Passed - Patient is age 18 or older       Passed - No active pregnancy on record       Passed - Normal serum creatinine on file in past 12 months    Recent Labs   Lab Test  07/19/17   1047   CR  0.76            Passed - No positive pregnancy test in past 12 months          "

## 2018-04-12 NOTE — TELEPHONE ENCOUNTER
Please call the patient.  Her last BP was not controlled.  She is due a face to face clinic appointment, BP recheck, and pap smear.  Please scheule with Mira Draper at the patient's earliest convenience.

## 2018-05-09 ENCOUNTER — OFFICE VISIT (OUTPATIENT)
Dept: FAMILY MEDICINE | Facility: CLINIC | Age: 58
End: 2018-05-09
Payer: COMMERCIAL

## 2018-05-09 ENCOUNTER — RADIANT APPOINTMENT (OUTPATIENT)
Dept: MAMMOGRAPHY | Facility: CLINIC | Age: 58
End: 2018-05-09
Payer: COMMERCIAL

## 2018-05-09 VITALS
SYSTOLIC BLOOD PRESSURE: 134 MMHG | OXYGEN SATURATION: 98 % | TEMPERATURE: 97.3 F | HEART RATE: 82 BPM | DIASTOLIC BLOOD PRESSURE: 89 MMHG | RESPIRATION RATE: 18 BRPM | HEIGHT: 64 IN | WEIGHT: 142.25 LBS | BODY MASS INDEX: 24.28 KG/M2

## 2018-05-09 DIAGNOSIS — I47.10 PAROXYSMAL SUPRAVENTRICULAR TACHYCARDIA (H): ICD-10-CM

## 2018-05-09 DIAGNOSIS — D23.5 BENIGN NEOPLASM OF SKIN OF TRUNK, EXCEPT SCROTUM: ICD-10-CM

## 2018-05-09 DIAGNOSIS — Z12.31 VISIT FOR SCREENING MAMMOGRAM: ICD-10-CM

## 2018-05-09 DIAGNOSIS — I10 HYPERTENSION GOAL BP (BLOOD PRESSURE) < 140/90: Primary | ICD-10-CM

## 2018-05-09 DIAGNOSIS — F33.1 MAJOR DEPRESSIVE DISORDER, RECURRENT EPISODE, MODERATE (H): ICD-10-CM

## 2018-05-09 DIAGNOSIS — B00.1 COLD SORE: ICD-10-CM

## 2018-05-09 PROCEDURE — 99214 OFFICE O/P EST MOD 30 MIN: CPT | Performed by: NURSE PRACTITIONER

## 2018-05-09 PROCEDURE — 77067 SCR MAMMO BI INCL CAD: CPT

## 2018-05-09 RX ORDER — LOSARTAN POTASSIUM 50 MG/1
50 TABLET ORAL DAILY
Qty: 90 TABLET | Status: SHIPPED | OUTPATIENT
Start: 2018-05-09 | End: 2019-05-20

## 2018-05-09 RX ORDER — PENCICLOVIR 10 MG/G
CREAM TOPICAL
Qty: 1.5 G | Refills: 3 | Status: CANCELLED | OUTPATIENT
Start: 2018-05-09

## 2018-05-09 RX ORDER — VERAPAMIL HYDROCHLORIDE 240 MG/1
240 TABLET, FILM COATED, EXTENDED RELEASE ORAL DAILY
Qty: 90 TABLET | Status: SHIPPED | OUTPATIENT
Start: 2018-05-09 | End: 2019-06-09

## 2018-05-09 RX ORDER — VALACYCLOVIR HYDROCHLORIDE 1 G/1
2000 TABLET, FILM COATED ORAL 2 TIMES DAILY
Qty: 20 TABLET | Status: SHIPPED | OUTPATIENT
Start: 2018-05-09 | End: 2022-02-23

## 2018-05-09 RX ORDER — DULOXETIN HYDROCHLORIDE 30 MG/1
90 CAPSULE, DELAYED RELEASE ORAL DAILY
Qty: 270 CAPSULE | Refills: 3 | Status: SHIPPED | OUTPATIENT
Start: 2018-05-09 | End: 2019-05-11

## 2018-05-09 NOTE — LETTER
My Depression Action Plan  Name: Kerri Timmons   Date of Birth 1960  Date: 5/9/2018    My doctor: Mira Draper   My clinic: [address] [id number] [phone]          GREEN    ZONE   Good Control    What it looks like:     Things are going generally well. You have normal up s and down s. You may even feel depressed from time to time, but bad moods usually last less than a day.   What you need to do:  1. Continue to care for yourself (see self care plan)  2. Check your depression survival kit and update it as needed  3. Follow your physician s recommendations including any medication.  4. Do not stop taking medication unless you consult with your physician first.           YELLOW         ZONE Getting Worse    What it looks like:     Depression is starting to interfere with your life.     It may be hard to get out of bed; you may be starting to isolate yourself from others.    Symptoms of depression are starting to last most all day and this has happened for several days.     You may have suicidal thoughts but they are not constant.   What you need to do:     1. Call your care team, your response to treatment will improve if you keep your care team informed of your progress. Yellow periods are signs an adjustment may need to be made.     2. Continue your self-care, even if you have to fake it!    3. Talk to someone in your support network    4. Open up your depression survival kit           RED    ZONE Medical Alert - Get Help    What it looks like:     Depression is seriously interfering with your life.     You may experience these or other symptoms: You can t get out of bed most days, can t work or engage in other necessary activities, you have trouble taking care of basic hygiene, or basic responsibilities, thoughts of suicide or death that will not go away, self-injurious behavior.     What you need to do:  1. Call your care team and  request a same-day appointment. If they are not available (weekends or after hours) call your local crisis line, emergency room or 911.            Depression Self Care Plan / Survival Kit    Self-Care for Depression  Here s the deal. Your body and mind are really not as separate as most people think.  What you do and think affects how you feel and how you feel influences what you do and think. This means if you do things that people who feel good do, it will help you feel better.  Sometimes this is all it takes.  There is also a place for medication and therapy depending on how severe your depression is, so be sure to consult with your medical provider and/ or Behavioral Health Consultant if your symptoms are worsening or not improving.     In order to better manage my stress, I will:    Exercise  Get some form of exercise, every day. This will help reduce pain and release endorphins, the  feel good  chemicals in your brain. This is almost as good as taking antidepressants!  This is not the same as joining a gym and then never going! (they count on that by the way ) It can be as simple as just going for a walk or doing some gardening, anything that will get you moving.      Hygiene   Maintain good hygiene (Get out of bed in the morning, Make your bed, Brush your teeth, Take a shower, and Get dressed like you were going to work, even if you are unemployed).  If your clothes don't fit try to get ones that do.    Diet  I will strive to eat foods that are good for me, drink plenty of water, and avoid excessive sugar, caffeine, alcohol, and other mood-altering substances.  Some foods that are helpful in depression are: complex carbohydrates, B vitamins, flaxseed, fish or fish oil, fresh fruits and vegetables.    Psychotherapy  I agree to participate in Individual Therapy (if recommended).    Medication  If prescribed medications, I agree to take them.  Missing doses can result in serious side effects.  I understand that  drinking alcohol, or other illicit drug use, may cause potential side effects.  I will not stop my medication abruptly without first discussing it with my provider.    Staying Connected With Others  I will stay in touch with my friends, family members, and my primary care provider/team.    Use your imagination  Be creative.  We all have a creative side; it doesn t matter if it s oil painting, sand castles, or mud pies! This will also kick up the endorphins.    Witness Beauty  (AKA stop and smell the roses) Take a look outside, even in mid-winter. Notice colors, textures. Watch the squirrels and birds.     Service to others  Be of service to others.  There is always someone else in need.  By helping others we can  get out of ourselves  and remember the really important things.  This also provides opportunities for practicing all the other parts of the program.    Humor  Laugh and be silly!  Adjust your TV habits for less news and crime-drama and more comedy.    Control your stress  Try breathing deep, massage therapy, biofeedback, and meditation. Find time to relax each day.     My support system    Clinic Contact:  Phone number:    Contact 1:  Phone number:    Contact 2:  Phone number:    Congregation/:  Phone number:    Therapist:  Phone number:    Local crisis center:    Phone number:    Other community support:  Phone number:

## 2018-05-09 NOTE — PROGRESS NOTES
"  SUBJECTIVE:   Kerri Timmons is a 57 year old female who presents to clinic today for the following health issues:      Hypertension Follow-up      Outpatient blood pressures are being checked at home.  Results are this morning 150/115.    Low Salt Diet: no added salt      Amount of exercise or physical activity: about 3 times per week, 30 minutes     Problems taking medications regularly: No    Medication side effects: verapamil makes pt sleepy, she tries to take a night     Diet: regular (no restrictions)    Home blood pressure readings are typically over 140/90.    She would like something for cold sores.    She has an ingrown hair on her left shoulder that gets inflamed.         Problem list and histories reviewed & adjusted, as indicated.  Additional history: as documented        Reviewed and updated as needed this visit by clinical staff       Reviewed and updated as needed this visit by Provider         ROS:  CONSTITUTIONAL: NEGATIVE for fever, chills, change in weight  INTEGUMENTARY/SKIN: see HPI  ENT/MOUTH: NEGATIVE for ear, mouth and throat problems  RESP: NEGATIVE for significant cough or SOB  CV: NEGATIVE for chest pain, palpitations or peripheral edema  GI: NEGATIVE for nausea, abdominal pain, heartburn, or change in bowel habits  MUSCULOSKELETAL: NEGATIVE for significant arthralgias or myalgia  NEURO: NEGATIVE for weakness, dizziness or paresthesias  PSYCHIATRIC: NEGATIVE for changes in mood or affect    OBJECTIVE:     /89  Pulse 82  Temp 97.3  F (36.3  C) (Oral)  Resp 18  Ht 5' 3.5\" (1.613 m)  Wt 142 lb 4 oz (64.5 kg)  SpO2 98%  BMI 24.8 kg/m2  Body mass index is 24.8 kg/(m^2).  GENERAL: healthy, alert and no distress  NECK: no adenopathy, no asymmetry, masses, or scars and thyroid normal to palpation  RESP: lungs clear to auscultation - no rales, rhonchi or wheezes  CV: regular rate and rhythm, normal S1 S2, no S3 or S4, no murmur, click or rub, no peripheral edema and peripheral pulses " strong  SKIN: small erythematous cystic nodule left shoulder  PSYCH: mentation appears normal, affect normal/bright        ASSESSMENT/PLAN:             1. Hypertension goal BP (blood pressure) < 140/90  Not at goal according to home readings  She does feel tired from Verapamil, so will not increase dose.    Will add Losartan and recheck blood pressure in 2-3 weeks.  Can consider increasing the Losartan dose and lowering Verapamil dose in the future to help with side effects if needed.   - verapamil (CALAN-SR) 240 MG CR tablet; Take 1 tablet (240 mg) by mouth daily  Dispense: 90 tablet; Refill: PRN  - losartan (COZAAR) 50 MG tablet; Take 1 tablet (50 mg) by mouth daily  Dispense: 90 tablet; Refill: PRN    2. Paroxysmal supraventricular tachycardia (H)  Refills given.   - verapamil (CALAN-SR) 240 MG CR tablet; Take 1 tablet (240 mg) by mouth daily  Dispense: 90 tablet; Refill: PRN    3. Cold sore  Discussed the use and indication of this medication as well as potential side effects.   - valACYclovir (VALTREX) 1000 mg tablet; Take 2 tablets (2,000 mg) by mouth 2 times daily for 1 day  Dispense: 20 tablet; Refill: PRN    4. Benign neoplasm of skin of trunk, except scrotum    - DERMATOLOGY REFERRAL    5. Major depressive disorder, recurrent episode, moderate (H)  The current medical regimen is effective;  continue present plan and medications.   - DULoxetine (CYMBALTA) 30 MG EC capsule; Take 3 capsules (90 mg) by mouth daily  Dispense: 270 capsule; Refill: 3    She will return for a physical in July.     Mira Draper NP  [facility]

## 2018-05-09 NOTE — MR AVS SNAPSHOT
After Visit Summary   5/9/2018    Kerri Timmons    MRN: [id number]           Patient Information     Date Of Birth          1960        Visit Information        Provider Department      5/9/2018 10:00 AM Mira Draper NP [facility]        Today's Diagnoses     Hypertension goal BP (blood pressure) < 140/90    -  1    Paroxysmal supraventricular tachycardia (H)        Cold sore        Benign neoplasm of skin of trunk, except scrotum           Follow-ups after your visit        Additional Services     DERMATOLOGY REFERRAL       Your provider has referred you to: [facility]: [facility] [place] [phone]   [email or web address]    Please be aware that coverage of these services is subject to the terms and limitations of your health insurance plan.  Call member services at your health plan with any benefit or coverage questions.      Please bring the following with you to your appointment:    (1) Any X-Rays, CTs or MRIs which have been performed.  Contact the facility where they were done to arrange for  prior to your scheduled appointment.    (2) List of current medications  (3) This referral request   (4) Any documents/labs given to you for this referral                  Who to contact     If you have questions or need follow up information about today's clinic visit or your schedule please contact [facility] directly at [phone].  Normal or non-critical lab and imaging results will be communicated to you by MyChart, letter or phone within 4 business days after the clinic has received the results. If you do not hear from us within 7 days, please contact the clinic through MyChart or phone. If you have a critical or abnormal lab result, we will notify you by phone as soon as possible.  Submit refill requests through Liztic or call your pharmacy and they will forward the refill request to us. Please allow 3 business  "days for your refill to be completed.          Additional Information About Your Visit        MyChart Information     Agolo gives you secure access to your electronic health record. If you see a primary care provider, you can also send messages to your care team and make appointments. If you have questions, please call your primary care clinic.  If you do not have a primary care provider, please call [phone] and they will assist you.        Care EveryWhere ID     This is your Care EveryWhere ID. This could be used by other organizations to access your [place] medical records  [id number]        Your Vitals Were     Pulse Temperature Respirations Height Pulse Oximetry BMI (Body Mass Index)    82 97.3  F (36.3  C) (Oral) 18 5' 3.5\" (1.613 m) 98% 24.8 kg/m2       Blood Pressure from Last 3 Encounters:   05/09/18 134/89   08/22/17 (!) 146/99   08/14/17 127/84    Weight from Last 3 Encounters:   05/09/18 142 lb 4 oz (64.5 kg)   08/22/17 146 lb (66.2 kg)   08/14/17 148 lb (67.1 kg)              We Performed the Following     DEPRESSION ACTION PLAN (DAP)     DERMATOLOGY REFERRAL          Today's Medication Changes          These changes are accurate as of 5/9/18 10:54 AM.  If you have any questions, ask your nurse or doctor.               Start taking these medicines.        Dose/Directions    losartan 50 MG tablet   Commonly known as:  COZAAR   Used for:  Hypertension goal BP (blood pressure) < 140/90   Started by:  Mira Draper NP        Dose:  50 mg   Take 1 tablet (50 mg) by mouth daily   Quantity:  90 tablet   Refills:  PRN       valACYclovir 1000 mg tablet   Commonly known as:  VALTREX   Used for:  Cold sore   Started by:  Mira Draper NP        Dose:  2000 mg   Take 2 tablets (2,000 mg) by mouth 2 times daily for 1 day   Quantity:  20 tablet   Refills:  PRN            Where to get your medicines      These medications were sent to [facility]pharm[id number] - [place], [place] - [address]  " [address], [place] [address]     Phone:  [phone]     losartan 50 MG tablet    valACYclovir 1000 mg tablet    verapamil 240 MG CR tablet                Primary Care Provider Office Phone # Fax #    Mira LOZA SHANTI Draper [phone] [address] [facility] [address]        Equal Access to Services     [facility]: Hadii aad ku hadasho Soomaali, waaxda luqadaha, qaybta kaalmada adeegyada, waxay idiin hayaan adeeg kharash la'aan . So [facility] [phone].    ATENCIÓN: Si habla español, tiene a davis disposición servicios gratuitos de asistencia lingüística. Autumn al [phone].    We comply with applicable federal civil rights laws and [place] laws. We do not discriminate on the basis of race, color, national origin, age, disability, sex, sexual orientation, or gender identity.            Thank you!     Thank you for choosing [facility]  for your care. Our goal is always to provide you with excellent care. Hearing back from our patients is one way we can continue to improve our services. Please take a few minutes to complete the written survey that you may receive in the mail after your visit with us. Thank you!             Your Updated Medication List - Protect others around you: Learn how to safely use, store and throw away your medicines at www.disposemymeds.org.          This list is accurate as of 5/9/18 10:54 AM.  Always use your most recent med list.                   Brand Name Dispense Instructions for use Diagnosis    calcium carbonate 500 tablet    OS-FADY 500 mg [place]. Ca    100 tablet    Take 1 tablet by mouth daily.        DULoxetine 30 MG EC capsule    CYMBALTA    270 capsule    Take 3 capsules (90 mg) by mouth daily    Major depressive disorder, recurrent episode, moderate (H)       losartan 50 MG tablet    COZAAR    90 tablet    Take 1 tablet (50 mg) by mouth daily    Hypertension goal BP (blood pressure) < 140/90       Multi-vitamin Tabs  tablet   Generic drug:  multivitamin, therapeutic with minerals     100    as directed    Routine general medical examination at a health care facility       valACYclovir 1000 mg tablet    VALTREX    20 tablet    Take 2 tablets (2,000 mg) by mouth 2 times daily for 1 day    Cold sore       verapamil 240 MG CR tablet    CALAN-SR    90 tablet    Take 1 tablet (240 mg) by mouth daily    Hypertension goal BP (blood pressure) < 140/90, Paroxysmal supraventricular tachycardia (H)

## 2018-05-10 ASSESSMENT — PATIENT HEALTH QUESTIONNAIRE - PHQ9: SUM OF ALL RESPONSES TO PHQ QUESTIONS 1-9: 0

## 2018-05-17 DIAGNOSIS — I47.10 PAROXYSMAL SUPRAVENTRICULAR TACHYCARDIA (H): ICD-10-CM

## 2018-05-17 DIAGNOSIS — I10 HYPERTENSION GOAL BP (BLOOD PRESSURE) < 140/90: ICD-10-CM

## 2018-05-17 RX ORDER — VERAPAMIL HYDROCHLORIDE 240 MG/1
TABLET, FILM COATED, EXTENDED RELEASE ORAL
Qty: 30 TABLET | Refills: 0 | OUTPATIENT
Start: 2018-05-17

## 2018-05-18 ENCOUNTER — RADIANT APPOINTMENT (OUTPATIENT)
Dept: MAMMOGRAPHY | Facility: CLINIC | Age: 58
End: 2018-05-18
Attending: NURSE PRACTITIONER
Payer: COMMERCIAL

## 2018-05-18 DIAGNOSIS — R92.8 ABNORMAL MAMMOGRAM OF RIGHT BREAST: ICD-10-CM

## 2018-05-30 ENCOUNTER — RADIANT APPOINTMENT (OUTPATIENT)
Dept: MAMMOGRAPHY | Facility: CLINIC | Age: 58
End: 2018-05-30
Attending: NURSE PRACTITIONER
Payer: COMMERCIAL

## 2018-05-30 ENCOUNTER — TELEPHONE (OUTPATIENT)
Dept: FAMILY MEDICINE | Facility: CLINIC | Age: 58
End: 2018-05-30

## 2018-05-30 ENCOUNTER — TRANSFERRED RECORDS (OUTPATIENT)
Dept: HEALTH INFORMATION MANAGEMENT | Facility: CLINIC | Age: 58
End: 2018-05-30

## 2018-05-30 DIAGNOSIS — R92.8 ABNORMAL MAMMOGRAM OF RIGHT BREAST: ICD-10-CM

## 2018-05-30 LAB
CREAT SERPL-MCNC: 0.63 MG/DL (ref 0.57–1.11)
GFR SERPL CREATININE-BSD FRML MDRD: >60 ML/MIN/1.73M2
GLUCOSE SERPL-MCNC: 101 MG/DL (ref 65–100)
INR PPP: 1
POTASSIUM SERPL-SCNC: 3.3 MMOL/L (ref 3.5–5)

## 2018-05-30 PROCEDURE — 88305 TISSUE EXAM BY PATHOLOGIST: CPT | Performed by: NURSE PRACTITIONER

## 2018-05-30 PROCEDURE — 77066 DX MAMMO INCL CAD BI: CPT

## 2018-05-30 PROCEDURE — 19081 BX BREAST 1ST LESION STRTCTC: CPT | Mod: RT

## 2018-05-30 RX ORDER — IOPAMIDOL 755 MG/ML
86 INJECTION, SOLUTION INTRAVASCULAR ONCE
Status: COMPLETED | OUTPATIENT
Start: 2018-05-30 | End: 2018-05-30

## 2018-05-30 RX ORDER — LIDOCAINE HYDROCHLORIDE AND EPINEPHRINE 10; 10 MG/ML; UG/ML
16 INJECTION, SOLUTION INFILTRATION; PERINEURAL ONCE
Status: COMPLETED | OUTPATIENT
Start: 2018-05-30 | End: 2018-05-30

## 2018-05-30 RX ADMIN — LIDOCAINE HYDROCHLORIDE AND EPINEPHRINE 16 ML: 10; 10 INJECTION, SOLUTION INFILTRATION; PERINEURAL at 14:02

## 2018-05-30 RX ADMIN — Medication 1 MEQ: at 14:02

## 2018-05-30 RX ADMIN — Medication 2 MEQ: at 14:02

## 2018-05-30 RX ADMIN — IOPAMIDOL 86 ML: 755 INJECTION, SOLUTION INTRAVASCULAR at 13:20

## 2018-05-30 NOTE — TELEPHONE ENCOUNTER
"   S-(situation): Breast bx in [place] today . Bx was done at 2pm, she got home at 4 pm. Noticed bleeding at 430 . \" It hurts\" She has taken tylenol only for pain  Now feels a baseball size \"hematoma\" ,  \"inside above breast is hard as a rock\"  She was given small  ice pack after procedure, now put a large bag of frozen vegetables on area since at home    B-(background): she called back to the [place] clinic at 445 and they said to put compression on it with her hand for 20 minutes- the bleeding stopped but now lump the size of a golf ball . She cannot call them back as they are now closed.    A-(assessment): needs immediate eval due to degree of pain and the large size and feeling of hardness    R-(recommendations): to ED now, she agrees    Brenda Rollins, RN, BSN         "

## 2018-05-31 ENCOUNTER — TELEPHONE (OUTPATIENT)
Dept: GENERAL RADIOLOGY | Facility: CLINIC | Age: 58
End: 2018-05-31

## 2018-05-31 NOTE — TELEPHONE ENCOUNTER
Spoke to Kerri today regarding status post biopsy yesterday (5/30/2018).  She is currently admitted to [facility] due to excessive bleeding and pain post breast biopsy.  She stated that the area began to bleed again and she started to experience pain from the upper part of her breast (around the 12oclock position) that radiated to her nipple.  She was told to go to the ER by Triage RN. Due to not feeling well ended up being transported via ambulance and admitted to [facility] yesterday evening.  Per Kerri the bleeding has stopped after a pressure compress was placed.  She states the area is very bruised but soft to the touch.  Events were discussed with Dr. Murphy, Breast Radiologist, and she recommended that if Kerri is discharged today that we make a follow up ultrasound appointment for her.  She is currently scheduled [facility] Friday, [facility]st [facility]  Writer gave Kerri numbers to call if for some reason she needs to stay another day and that we would reschedule if needed. Kerri verbalized plan.

## 2018-06-01 ENCOUNTER — RADIANT APPOINTMENT (OUTPATIENT)
Dept: MAMMOGRAPHY | Facility: CLINIC | Age: 58
End: 2018-06-01
Attending: NURSE PRACTITIONER
Payer: COMMERCIAL

## 2018-06-01 DIAGNOSIS — Z09 FOLLOW UP: ICD-10-CM

## 2018-06-04 LAB — COPATH REPORT: NORMAL

## 2018-06-06 ENCOUNTER — TELEPHONE (OUTPATIENT)
Dept: GENERAL RADIOLOGY | Facility: CLINIC | Age: 58
End: 2018-06-06

## 2018-06-06 NOTE — TELEPHONE ENCOUNTER
Spoke to Kerri regarding the benign findings from her recent breast biopsy. Discussed the radiologist's recommendation for a 6 month follow-up right breast diagnostic mammogram. Kerri is [place] [facility] [place]. Writer also assessed post biopsy hematoma healing. Kerri stated that it seems to be healing, but stated she still has a lump in her breast and tenderness. Writer reassured her that healing will take 4-6+ as her body reabsorbs the hematoma, and encouraged her to continue with application of warm compresses for no more than 15-20 minutes at a time, a few times per day. She stated that she did apply heat a couple times and that she will continue to do so per recommendation. Kreri verbalized understanding of these results and the follow-up plan and all questions and concerns were addressed at this time.

## 2018-06-12 ENCOUNTER — TELEPHONE (OUTPATIENT)
Dept: MAMMOGRAPHY | Facility: CLINIC | Age: 58
End: 2018-06-12

## 2018-06-12 NOTE — TELEPHONE ENCOUNTER
"Late Note: Received a call from Kerri at 11:00 am, she had concerns related to her hematoma and healing. She stated that she worked a full shift yesterday and after that her breast was sore and the lump in her right breast seemed to grow again and bruising worsened. She stated that she didn't think she could work a full shift again for a while. Upon further assessment, when asked if the area had increased in size and asked if she thought hematoma was redeveloping, Kerri stated \"No, well, I don't know. I want to know if this is normal healing\" then stated \" also, I am wondering if I should be worried about any clots in my breast dislodging and going anywhere else in my body\", writer reassured Kerri that there is not a concern for that occurring and that her body would reabsorb the hematoma over time and that it could take up to 6 weeks for full healing to occur. Writer then went on to assess the current condition of the breast, Kerri stated that it had grown the size of a \"golf ball\" while she was at work. Writer ended the call with Kerri with the agreement of consulting with the Radiologist Staff, and calling Kerri back with recommendation. After consulting with Dr. Murphy, it was recommended that Kerri come in to have an assessment given nonspecific symptoms. Writer attempted to call Kerri back, but unable to reach her. Left a detailed voicemail with radiologist's recommendation. Awaiting a return call.  "

## 2018-06-13 ENCOUNTER — TELEPHONE (OUTPATIENT)
Dept: GENERAL RADIOLOGY | Facility: CLINIC | Age: 58
End: 2018-06-13

## 2018-06-14 ENCOUNTER — OFFICE VISIT (OUTPATIENT)
Dept: FAMILY MEDICINE | Facility: CLINIC | Age: 58
End: 2018-06-14
Payer: COMMERCIAL

## 2018-06-14 VITALS
BODY MASS INDEX: 24.93 KG/M2 | DIASTOLIC BLOOD PRESSURE: 88 MMHG | HEART RATE: 82 BPM | WEIGHT: 143 LBS | OXYGEN SATURATION: 99 % | RESPIRATION RATE: 16 BRPM | SYSTOLIC BLOOD PRESSURE: 142 MMHG

## 2018-06-14 DIAGNOSIS — B00.1 COLD SORE: Primary | ICD-10-CM

## 2018-06-14 DIAGNOSIS — I10 HYPERTENSION GOAL BP (BLOOD PRESSURE) < 140/90: ICD-10-CM

## 2018-06-14 DIAGNOSIS — N64.89 HEMATOMA OF BREAST: ICD-10-CM

## 2018-06-14 PROCEDURE — 99214 OFFICE O/P EST MOD 30 MIN: CPT | Performed by: FAMILY MEDICINE

## 2018-06-14 RX ORDER — PENCICLOVIR 10 MG/G
CREAM TOPICAL
Qty: 5 G | Refills: 3 | Status: SHIPPED | OUTPATIENT
Start: 2018-06-14 | End: 2019-04-26

## 2018-06-14 NOTE — MR AVS SNAPSHOT
"              After Visit Summary   6/14/2018    Kerri Timmons    MRN: [id number]           Patient Information     Date Of Birth          1960        Visit Information        Provider Department      6/14/2018 10:00 AM Neha Nance MD [facility]        Today's Diagnoses     Cold sore    -  1    Hematoma of breast        Hypertension goal BP (blood pressure) < 140/90           Follow-ups after your visit        Follow-up notes from your care team     Return if symptoms worsen or fail to improve.      Your next 10 appointments already scheduled     Dec 06, 2018 10:30 AM KEVIN   MA DIAGNOSTIC DIGITAL RIGHT with [id number]   [facility] ([facility])    [address]  [address]  [place] [address] [phone]           Do not use any powder, lotion or deodorant under your arms or on your breast. If you do, we will ask you to remove it before your exam.  Wear comfortable, two-piece clothing.  If you have any allergies, tell your care team.  Bring any previous mammograms from other facilities or have them mailed to the breast center.   Three-dimensional (3D) mammograms are available at [place] locations in [facility], [place], [place], [facility], [place], [place], and [place]. [facility] locations include [place] and [facility] in [place]. Benefits of 3D mammograms include: - Improved rate of cancer detection - Decreases your chance of having to go back for more tests, which means fewer: - \"False-positive\" results (This means that there is an abnormal area but it isn't cancer.) - Invasive testing procedures, such as a biopsy or surgery - Can provide clearer images of the breast if you have dense breast tissue. 3D mammography is an optional exam that anyone can have with a 2D mammogram. It doesn't replace or take the place of a 2D mammogram. 2D mammograms remain an effective screening " test for all women.  Not all insurance companies cover the cost of a 3D mammogram. Check with your insurance.            Dec 06, 2018 11:00 AM CST   US BREAST RIGHT LIMITED 1-3 QUAD with UCBCUS2   [facility] ([facility])    [address]  [address] [facility] [address] [phone]           Please bring a list of your medicines (including vitamins, minerals and over-the-counter drugs). Also, tell your doctor about any allergies you may have. Wear comfortable clothes and leave your valuables at home.  You do not need to do anything special to prepare for your exam.  Please call the Imaging Department at your exam site with any questions.              Who to contact     If you have questions or need follow up information about today's clinic visit or your schedule please contact [facility] directly at [phone].  Normal or non-critical lab and imaging results will be communicated to you by MyChart, letter or phone within 4 business days after the clinic has received the results. If you do not hear from us within 7 days, please contact the clinic through Locqushart or phone. If you have a critical or abnormal lab result, we will notify you by phone as soon as possible.  Submit refill requests through Soicos or call your pharmacy and they will forward the refill request to us. Please allow 3 business days for your refill to be completed.          Additional Information About Your Visit        LocqusharKUBOO Information     Soicos gives you secure access to your electronic health record. If you see a primary care provider, you can also send messages to your care team and make appointments. If you have questions, please call your primary care clinic.  If you do not have a primary care provider, please call [phone] and they will assist you.        Care EveryWhere ID     This is your Care EveryWhere ID. This could be used by other organizations  to access your [place] medical records  [id number]        Your Vitals Were     Pulse Respirations Last Period Pulse Oximetry Breastfeeding? BMI (Body Mass Index)    82 16 (LMP Unknown) 99% No 24.93 kg/m2       Blood Pressure from Last 3 Encounters:   06/14/18 142/88   05/09/18 134/89   08/22/17 (!) 146/99    Weight from Last 3 Encounters:   06/14/18 143 lb (64.9 kg)   05/09/18 142 lb 4 oz (64.5 kg)   08/22/17 146 lb (66.2 kg)              Today, you had the following     No orders found for display         Today's Medication Changes          These changes are accurate as of 6/14/18 11:59 PM.  If you have any questions, ask your nurse or doctor.               Start taking these medicines.        Dose/Directions    penciclovir 1 % cream   Commonly known as:  DENAVIR   Used for:  Cold sore   Started by:  Neha Nance MD        Apply topically every 2 hours (while awake)   Quantity:  5 g   Refills:  3            Where to get your medicines      These medications were sent to [id number] - [place], [place] - [address], [place] [address]     Phone:  [phone]     penciclovir 1 % cream                Primary Care Provider Office Phone # Fax #    Mira DAGO Draper [phone] [address] [facility] [address]        Equal Access to Services     [facility]: Hadii amirah ku hadasho Soayshaali, waaxda luqadaha, qaybta kaalmada adeegyada, waxefrem jules romero adecameron daniel. So [facility] [place] [phone].    ATENCIÓN: Si habla español, tiene a davis disposición servicios gratuitos de asistencia lingüística. Llalicia al [phone].    We comply with applicable federal civil rights laws and [place] laws. We do not discriminate on the basis of race, color, national origin, age, disability, sex, sexual orientation, or gender identity.            Thank you!     Thank you for choosing [facility]  for your care. Our  goal is always to provide you with excellent care. Hearing back from our patients is one way we can continue to improve our services. Please take a few minutes to complete the written survey that you may receive in the mail after your visit with us. Thank you!             Your Updated Medication List - Protect others around you: Learn how to safely use, store and throw away your medicines at www.disposemymeds.org.          This list is accurate as of 6/14/18 11:59 PM.  Always use your most recent med list.                   Brand Name Dispense Instructions for use Diagnosis    calcium carbonate 500 MG tablet    OS-FADY 500 mg [place]. Ca    100 tablet    Take 1 tablet by mouth daily.        DULoxetine 30 MG EC capsule    CYMBALTA    270 capsule    Take 3 capsules (90 mg) by mouth daily    Major depressive disorder, recurrent episode, moderate (H)       losartan 50 MG tablet    COZAAR    90 tablet    Take 1 tablet (50 mg) by mouth daily    Hypertension goal BP (blood pressure) < 140/90       Multi-vitamin Tabs tablet   Generic drug:  multivitamin, therapeutic with minerals     100    as directed    Routine general medical examination at a health care facility       penciclovir 1 % cream    DENAVIR    5 g    Apply topically every 2 hours (while awake)    Cold sore       valACYclovir 1000 mg tablet    VALTREX    20 tablet    Take 2 tablets (2,000 mg) by mouth 2 times daily for 1 day    Cold sore       verapamil 240 MG CR tablet    CALAN-SR    90 tablet    Take 1 tablet (240 mg) by mouth daily    Hypertension goal BP (blood pressure) < 140/90, Paroxysmal supraventricular tachycardia (H)

## 2018-06-14 NOTE — PROGRESS NOTES
SUBJECTIVE:   Kerri Timmons is a 57 year old female who presents to clinic today for the following health issues:    Follow up Biopsy  Blood pressure check.     Presents for FU s/p breast biopsy 5- for abnormal mammogram findings who returned home from procedure with post-procedure complication of sudden hematoma forming at biopsy site extending throughout majority of RIGHT breast causing increased pain and ED visit by ambulance with hospitalization x 24 hours for pain control and monitoring of the hematoma.    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  She continues with intermittent heating of area and use of OTC Tylenol/ibuprofen prn pain.  She is here to get work note today.    States swelling has improved but still remains size of tennis ball.  Was estimated to be size of baseball when admitted to hospital.    She has tried to continue to work despite this hematoma.  She is off work now until next week Thursday.    Biopsy results were negative.    Patient had been started on losartan 50mg once daily for HTN.  Blood pressure still slightly up but is in pain and has been only on the medication in past week.  No CP or HA or SOB.    She also would like to try Denavir cream for topical treatment of a cold sore that is persisting at corner of RIGHT mouth.    Problem list and histories reviewed & adjusted, as indicated.  Additional history: as documented    Patient Active Problem List   Diagnosis     Abnormal maternal glucose tolerance, antepartum     Malaise and fatigue     Disturbance in sleep behavior     Paroxysmal supraventricular tachycardia (H)     Cold sore     CARDIOVASCULAR SCREENING; LDL GOAL LESS THAN 160     ASCUS  Pap smear; + high risk HPV DNA     Health Care Home     Enthesopathy     Post concussive syndrome     Citizen [place] (hard of hearing)     Cataract, right eye     Hypertension goal BP (blood pressure) < 140/90     Major depressive disorder, recurrent episode, moderate  (H)     Past Surgical History:   Procedure Laterality Date     APPENDECTOMY       CHOLECYSTECTOMY         Social History   Substance Use Topics     Smoking status: Light Tobacco Smoker     Types: Cigarettes     Smokeless tobacco: Never Used      Comment: 6 per week      Alcohol use Yes      Comment: a couple drinks 3 times per week      Family History   Problem Relation Age of Onset     C.A.D. Father      early 60's     Hypertension Father      Alzheimer Disease Brother      Hypertension Sister          Current Outpatient Prescriptions   Medication Sig Dispense Refill     calcium carbonate 500 MG tablet Take 1 tablet by mouth daily. 100 tablet 3     DULoxetine (CYMBALTA) 30 MG EC capsule Take 3 capsules (90 mg) by mouth daily 270 capsule 3     losartan (COZAAR) 50 MG tablet Take 1 tablet (50 mg) by mouth daily 90 tablet PRN     MULTI-VITAMIN OR TABS as directed 100 0     penciclovir (DENAVIR) 1 % cream Apply topically every 2 hours (while awake) 5 g 3     verapamil (CALAN-SR) 240 MG CR tablet Take 1 tablet (240 mg) by mouth daily 90 tablet PRN     valACYclovir (VALTREX) 1000 mg tablet Take 2 tablets (2,000 mg) by mouth 2 times daily for 1 day 20 tablet PRN     Allergies   Allergen Reactions     No Known Drug Allergies      BP Readings from Last 3 Encounters:   06/14/18 142/88   05/09/18 134/89   08/22/17 (!) 146/99    Wt Readings from Last 3 Encounters:   06/14/18 143 lb (64.9 kg)   05/09/18 142 lb 4 oz (64.5 kg)   08/22/17 146 lb (66.2 kg)                    Reviewed and updated as needed this visit by clinical staff  Tobacco  Allergies  Meds  Med Hx  Surg Hx  Fam Hx  Soc Hx      Reviewed and updated as needed this visit by Provider         ROS:  Constitutional, HEENT, cardiovascular, pulmonary, gi and gu systems are negative, except as otherwise noted.    OBJECTIVE:     /88  Pulse 82  Resp 16  Wt 143 lb (64.9 kg)  LMP  (LMP Unknown)  SpO2 99%  Breastfeeding? No  BMI 24.93 kg/m2  Body mass index  is 24.93 kg/(m^2).  GENERAL: healthy, alert and no distress  EYES: Eyes grossly normal to inspection, PERRL and conjunctivae and sclerae normal  NECK: no adenopathy, no asymmetry, masses, or scars and thyroid normal to palpation  BREAST: tennis ball sized purplish firm hematoma in RUQ of RIGHT breast with yellow discoloration of outer edges of breast from initial hematoma- tender to palpation, steri strips still intact from biopsy site, no erythema, c/d/i  MS: no gross musculoskeletal defects noted, no edema  SKIN: NO rashes or suspicious lesions, cold sore RIGHT side of mouth between upper and lower lip  PSYCH: Normal mood and affect    Diagnostic Test Results:  none     ASSESSMENT/PLAN:     1. Hematoma of breast    Continue with heat to area-- advised to be careful doing any heavy lifting or activities like golf which she enjoys at this time until resolves.  Work note written to document days missed and reason why.    2. Hypertension goal BP (blood pressure) < 140/90    Blood pressure still elevated but tolerating losartan well.  Recommend bp check in one week for further titration at that time.    3. Cold sore    - penciclovir (DENAVIR) 1 % cream; Apply topically every 2 hours (while awake)  Dispense: 5 g; Refill: 3    Prescribed Denavir at patient request today.    Neha Nance MD  [facility]

## 2018-06-14 NOTE — LETTER
Work Note      Date: 6/14/2018      Name: Kerri Timmons                       YOB: 1960    Medical Record Number: [id number]    The patient was seen at: [facility]      Patient seen at clinic today for follow up.  Please excuse 6-1, 6-2, 6-3, 6-6, 6-7, 6-13 and 6- secondary to medical concerns.        _________________________  Neha Nance MD

## 2018-08-02 ENCOUNTER — OFFICE VISIT (OUTPATIENT)
Dept: OPHTHALMOLOGY | Facility: CLINIC | Age: 58
End: 2018-08-02
Attending: OPHTHALMOLOGY
Payer: COMMERCIAL

## 2018-08-02 ENCOUNTER — TELEPHONE (OUTPATIENT)
Dept: OPHTHALMOLOGY | Facility: CLINIC | Age: 58
End: 2018-08-02

## 2018-08-02 DIAGNOSIS — H25.041 POSTERIOR SUBCAPSULAR AGE-RELATED CATARACT OF RIGHT EYE: Primary | ICD-10-CM

## 2018-08-02 PROCEDURE — 92015 DETERMINE REFRACTIVE STATE: CPT | Mod: ZF

## 2018-08-02 PROCEDURE — 76519 ECHO EXAM OF EYE: CPT | Mod: ZF | Performed by: OPHTHALMOLOGY

## 2018-08-02 PROCEDURE — G0463 HOSPITAL OUTPT CLINIC VISIT: HCPCS | Mod: ZF

## 2018-08-02 ASSESSMENT — VISUAL ACUITY
OD_BAT_MED: 20/150
OD_SC: 20/150
OD_PH_SC: 20/80
OS_SC: 20/30
METHOD: SNELLEN - LINEAR

## 2018-08-02 ASSESSMENT — CUP TO DISC RATIO
OD_RATIO: 0.4
OS_RATIO: 0.4

## 2018-08-02 ASSESSMENT — SLIT LAMP EXAM - LIDS
COMMENTS: NORMAL
COMMENTS: NORMAL

## 2018-08-02 ASSESSMENT — EXTERNAL EXAM - RIGHT EYE: OD_EXAM: NORMAL

## 2018-08-02 ASSESSMENT — TONOMETRY
IOP_METHOD: ICARE
OD_IOP_MMHG: 10
OS_IOP_MMHG: 9

## 2018-08-02 ASSESSMENT — CONF VISUAL FIELD
OS_NORMAL: 1
OD_SUPERIOR_NASAL_RESTRICTION: 3

## 2018-08-02 ASSESSMENT — REFRACTION_MANIFEST
OS_ADD: +2.50
OD_AXIS: 180
OS_CYLINDER: +0.00
OD_SPHERE: -5.00
OS_SPHERE: +0.75
OS_AXIS: 000
OD_CYLINDER: +1.50
OD_ADD: +2.50

## 2018-08-02 ASSESSMENT — EXTERNAL EXAM - LEFT EYE: OS_EXAM: NORMAL

## 2018-08-02 NOTE — NURSING NOTE
Chief Complaints and History of Present Illnesses   Patient presents with     Consult For     Cataracts      HPI    Affected eye(s):  Right   Symptoms:     Blurred vision   Glare      Duration:  1 year   Frequency:  Constant, Daily       Do you have eye pain now?:  No      Comments:  Pt complains of blurry vision RE. Notes that she was told she has a Cataract RE 1 year ago. BE feel good and comfortable, occasionally dryness and uses Visine. Would like to discuss options with surgery, MD please review today. SABRINA JAQUEZ, COA 8:04 AM 08/02/2018

## 2018-08-02 NOTE — MR AVS SNAPSHOT
After Visit Summary   8/2/2018    Kerri Timmons    MRN: [id number]           Patient Information     Date Of Birth          1960        Visit Information        Provider Department      8/2/2018 7:15 AM Johny Daniels MD [facility]        Today's Diagnoses     Posterior subcapsular age-related cataract of right eye    -  1       Follow-ups after your visit        Your next 10 appointments already scheduled     Aug 22, 2018   Procedure with Johny Daniels MD   [facility] ([facility] [place])    [address] [place] [address]  5th [facility] [id number] [phone]           Located in the Clinics and Surgery Center at [address] [place] [address].   parking is very convenient and highly recommended.  is a $6 flat rate fee.  Both  and self parkers should enter the main arrival plaza from [facility]; parking attendants will direct you based on your parking preference.            Aug 23, 2018  1:15 PM CDT   Post-Op with Johny Daniels MD   Eye Clinic ([facility])    [place] [address] [address]   [phone]            Aug 28, 2018  8:15 AM CDT   Post-Op with Johny Daniels MD   Eye Clinic ([facility])    [address] [address]   [phone]            Oct 04, 2018 10:30 AM CDT   Post-Op with Johny Daniels MD   Eye Clinic ([facility])    [address] [address]   [phone]            Dec 06, 2018 10:30 AM CST   MA DIAGNOSTIC DIGITAL RIGHT with UCBCMA2   [facility] ([facility] [place])    [address] [facility] 2nd [address]  [facility] [id number] [phone]           Do not use any powder, lotion or deodorant under your arms or on your breast.  "If you do, we will ask you to remove it before your exam.  Wear comfortable, two-piece clothing.  If you have any allergies, tell your care team.  Bring any previous mammograms from other facilities or have them mailed to the breast center.   Three-dimensional (3D) mammograms are available at [place] locations in [facility], [facility], [facility], [place], [place], and [place]. [facility] locations include [place] and Clinic & Surgery Center in [place]. Benefits of 3D mammograms include: - Improved rate of cancer detection - Decreases your chance of having to go back for more tests, which means fewer: - \"False-positive\" results (This means that there is an abnormal area but it isn't cancer.) - Invasive testing procedures, such as a biopsy or surgery - Can provide clearer images of the breast if you have dense breast tissue. 3D mammography is an optional exam that anyone can have with a 2D mammogram. It doesn't replace or take the place of a 2D mammogram. 2D mammograms remain an effective screening test for all women.  Not all insurance companies cover the cost of a 3D mammogram. Check with your insurance.            Dec 06, 2018 11:00 AM CST   US BREAST RIGHT LIMITED 1-3 QUAD with UCBCUS2   [facility] Imaging ([facility] [place])    [address] [facility]nd [facility] [address] [phone]           Please bring a list of your medicines (including vitamins, minerals and over-the-counter drugs). Also, tell your doctor about any allergies you may have. Wear comfortable clothes and leave your valuables at home.  You do not need to do anything special to prepare for your exam.  Please call the Imaging Department at your exam site with any questions.              Who to contact     Please call your clinic at [phone] to:    Ask questions about your health    Make or cancel appointments    Discuss your medicines    Learn about your test " results    Speak to your doctor            Additional Information About Your Visit        Sol[employer] Information     "[employer] gives you secure access to your electronic health record. If you see a primary care provider, you can also send messages to your care team and make appointments. If you have questions, please call your primary care clinic.  If you do not have a primary care provider, please call [phone] and they will assist you.      "[employer] is an electronic gateway that provides easy, online access to your medical records. With "[employer] you can request a clinic appointment, read your test results, renew a prescription or communicate with your care team.     To access your existing account, please contact your [facility] or call [phone] for assistance.        Care EveryWhere ID     This is your Care EveryWhere ID. This could be used by other organizations to access your [place] medical records  [id number]         Blood Pressure from Last 3 Encounters:   06/14/18 142/88   05/09/18 134/89   08/22/17 (!) 146/99    Weight from Last 3 Encounters:   06/14/18 64.9 kg (143 lb)   05/09/18 64.5 kg (142 lb 4 oz)   08/22/17 66.2 kg (146 lb)              We Performed the Following     IOL Biometry w/ IOL calc OU (both eye)     Kim-Operative Worksheet        Primary Care Provider Office Phone # Fax #    Mira DAGO Draper [phone]       [address] [facility] [address]        Equal Access to Services     AUBREY SWAN : Hadii aad ku hadasho Soomaali, waaxda luqadaha, qaybta kaalmada adeegyada, stacey daniel. So [facility] [phone].    ATENCIÓN: Si habla español, tiene a davis disposición servicios gratuitos de asistencia lingüística. Llame al [phone].    We comply with applicable federal civil rights laws and [place] laws. We do not discriminate on the basis of race, color, national origin, age, disability, sex, sexual  orientation, or gender identity.            Thank you!     Thank you for choosing EYE CLINIC  for your care. Our goal is always to provide you with excellent care. Hearing back from our patients is one way we can continue to improve our services. Please take a few minutes to complete the written survey that you may receive in the mail after your visit with us. Thank you!             Your Updated Medication List - Protect others around you: Learn how to safely use, store and throw away your medicines at www.disposemymeds.org.          This list is accurate as of 8/2/18  9:49 AM.  Always use your most recent med list.                   Brand Name Dispense Instructions for use Diagnosis    calcium carbonate 500 MG tablet    OS-FADY 500 mg [place]. Ca    100 tablet    Take 1 tablet by mouth daily.        DULoxetine 30 MG EC capsule    CYMBALTA    270 capsule    Take 3 capsules (90 mg) by mouth daily    Major depressive disorder, recurrent episode, moderate (H)       losartan 50 MG tablet    COZAAR    90 tablet    Take 1 tablet (50 mg) by mouth daily    Hypertension goal BP (blood pressure) < 140/90       Multi-vitamin Tabs tablet   Generic drug:  multivitamin, therapeutic with minerals     100    as directed    Routine general medical examination at a health care facility       penciclovir 1 % cream    DENAVIR    5 g    Apply topically every 2 hours (while awake)    Cold sore       valACYclovir 1000 mg tablet    VALTREX    20 tablet    Take 2 tablets (2,000 mg) by mouth 2 times daily for 1 day    Cold sore       verapamil 240 MG CR tablet    CALAN-SR    90 tablet    Take 1 tablet (240 mg) by mouth daily    Hypertension goal BP (blood pressure) < 140/90, Paroxysmal supraventricular tachycardia (H)

## 2018-08-02 NOTE — PROGRESS NOTES
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 Has noticed decrease in vision right eye over the past few months, at distance and near. Using OTC readers only currently. No eye pain, flashes/floaters, redness. Having a lot of trouble with depth perception due to blurriness right eye, especially at work in the NICU. Has also noticed problems in the sun when golfing.     POH:  -last eye exam 1 year old -told she had a small cataract at the back of her right eye - monitoring since that visit  -no known fam hx of eye disease    Gtts:  -using visine for dryness each eye PRN    PMH:  -no DM  -HTN on medication    A/P:    1. Posterior subcapsular cataract, right eye  -large in size, best corrected 20/150  -dilates to 7.5mm, no phacodonesis, no PXF; no hx of trauma, clear cornea  -has some anterior subcapsular changes superiorly as well, but good red reflex, probably will not require trypan blue  -mild inf PEE --> start lubricating tears QID right eye prior to surgery  -A/K measurements taken today - minimal cyl, plan for monofocal, pt aware she will need reading glasses for distance  -CE/IOL right eye planning    2. Nuclear sclerosis, left eye  -moderate, 20/30  -monitor for now, may wish to proceed after right eye     3. Dry eye, each eye  -inf PEE each eye  -stop visine  -start lubricating tears QID each eye    Planning CE/IOL right eye    MAC / top  Emmetropia    Complete documentation of historical and exam elements from today's encounter can be found in the full encounter summary report (not reduplicated in this progress note). I personally obtained the chief complaint(s) and history of present illness.  I confirmed and edited as necessary the review of systems, past medical/surgical history, family history, social history, and examination findings as documented by others.  I examined the patient myself,  and I personally reviewed the relevant tests, images, and reports as documented above. I formulated and edited as necessary the assessment and plan and discussed the findings and management plan with the patient and family.     I personally interpreted the diagnostic / imaging study and have edited the interpretation as needed.    Johny Daniels MD, MA  Director, Cornea & Anterior Segment  [facility] Department of Ophthalmology & Visual Neuroscience

## 2018-08-16 ENCOUNTER — OFFICE VISIT (OUTPATIENT)
Dept: FAMILY MEDICINE | Facility: CLINIC | Age: 58
End: 2018-08-16
Payer: COMMERCIAL

## 2018-08-16 VITALS
BODY MASS INDEX: 24.93 KG/M2 | TEMPERATURE: 98.1 F | SYSTOLIC BLOOD PRESSURE: 127 MMHG | HEART RATE: 70 BPM | WEIGHT: 143 LBS | OXYGEN SATURATION: 97 % | RESPIRATION RATE: 18 BRPM | DIASTOLIC BLOOD PRESSURE: 82 MMHG

## 2018-08-16 DIAGNOSIS — H26.9 CATARACT OF RIGHT EYE, UNSPECIFIED CATARACT TYPE: ICD-10-CM

## 2018-08-16 DIAGNOSIS — Z01.818 PREOP GENERAL PHYSICAL EXAM: Primary | ICD-10-CM

## 2018-08-16 PROCEDURE — 99214 OFFICE O/P EST MOD 30 MIN: CPT | Performed by: NURSE PRACTITIONER

## 2018-08-16 NOTE — MR AVS SNAPSHOT
After Visit Summary   8/16/2018    Kerri Timmons    MRN: [id number]           Patient Information     Date Of Birth          1960        Visit Information        Provider Department      8/16/2018 9:20 AM Ying Holder APRN CNP [facility]        Today's Diagnoses     Preop general physical exam    -  1    Cataract of right eye, unspecified cataract type          Care Instructions      Before Your Surgery      Call your surgeon if there is any change in your health. This includes signs of a cold or flu (such as a sore throat, runny nose, cough, rash or fever).    Do not smoke, drink alcohol or take over the counter medicine (unless your surgeon or primary care doctor tells you to) for the 24 hours before and after surgery.    If you take prescribed drugs: Follow your doctor s orders about which medicines to take and which to stop until after surgery.    Eating and drinking prior to surgery: follow the instructions from your surgeon    Take a shower or bath the night before surgery. Use the soap your surgeon gave you to gently clean your skin. If you do not have soap from your surgeon, use your regular soap. Do not shave or scrub the surgery site.  Wear clean pajamas and have clean sheets on your bed.           Follow-ups after your visit        Your next 10 appointments already scheduled     Aug 22, 2018   Procedure with Johny Daniels MD   [facility] ([facility])    [address] [place] [address]   [phone]           Located in the Clinics and Surgery Center at [address] [place] [address].   parking is very convenient and highly recommended.  is a $6 flat rate fee.  Both  and self parkers should enter the main arrival plaza from [facility]; parking attendants will direct you based on your parking preference.            Aug 23, 2018  1:15 PM CDT   Post-Op  "with Johny Daniels MD   Eye Clinic ([facility])    [place] [address]  [address] [address]  [facility] [address]   [phone]            Aug 28, 2018  8:15 AM CDT   Post-Op with Johny Daniels MD   Eye Clinic ([facility])    [place] [address]  9th Fl Clin 9a  [facility] [address]   [phone]            Oct 04, 2018 10:30 AM CDT   Post-Op with Johny Daniels MD   Eye Clinic ([facility])    [place] [address]  9th Fl Clin [address] [place] [address]   [phone]            Dec 06, 2018 10:30 AM CST   MA DIAGNOSTIC DIGITAL RIGHT with UCBCMA2   [facility] Imaging ([facility] and Surgery [place])    [address]  2nd Floor  [facility] [address]   [phone]           Do not use any powder, lotion or deodorant under your arms or on your breast. If you do, we will ask you to remove it before your exam.  Wear comfortable, two-piece clothing.  If you have any allergies, tell your care team.  Bring any previous mammograms from other facilities or have them mailed to the breast center.   Three-dimensional (3D) mammograms are available at [place] locations in [place], [facility], [facility], [place], [place], and [place]. [facility] locations include [place] and Clinic & Surgery Center in [place]. Benefits of 3D mammograms include: - Improved rate of cancer detection - Decreases your chance of having to go back for more tests, which means fewer: - \"False-positive\" results (This means that there is an abnormal area but it isn't cancer.) - Invasive testing procedures, such as a biopsy or surgery - Can provide clearer images of the breast if you have dense breast tissue. 3D mammography is an optional exam that anyone can have with a 2D mammogram. It doesn't replace or take the place of a 2D mammogram. 2D mammograms remain an " effective screening test for all women.  Not all insurance companies cover the cost of a 3D mammogram. Check with your insurance.            Dec 06, 2018 11:00 AM CST   US BREAST RIGHT LIMITED 1-3 QUAD with UCBCUS2   [facility] Imaging ([facility] and [facility] Center)    [address] [facility]  2nd Floor  [facility] [address] [phone]           Please bring a list of your medicines (including vitamins, minerals and over-the-counter drugs). Also, tell your doctor about any allergies you may have. Wear comfortable clothes and leave your valuables at home.  You do not need to do anything special to prepare for your exam.  Please call the Imaging Department at your exam site with any questions.              Who to contact     If you have questions or need follow up information about today's clinic visit or your schedule please contact [facility] directly at [phone].  Normal or non-critical lab and imaging results will be communicated to you by [employer], letter or phone within 4 business days after the clinic has received the results. If you do not hear from us within 7 days, please contact the clinic through [employer] or phone. If you have a critical or abnormal lab result, we will notify you by phone as soon as possible.  Submit refill requests through [employer] or call your pharmacy and they will forward the refill request to us. Please allow 3 business days for your refill to be completed.          Additional Information About Your Visit        [employer] Information     [employer] gives you secure access to your electronic health record. If you see a primary care provider, you can also send messages to your care team and make appointments. If you have questions, please call your primary care clinic.  If you do not have a primary care provider, please call [phone] and they will assist you.        Care EveryWhere ID     This is your Care EveryWhere ID. This could be used by  other organizations to access your [place] medical records  [id number]        Your Vitals Were     Pulse Temperature Respirations Pulse Oximetry BMI (Body Mass Index)       70 98.1  F (36.7  C) (Oral) 18 97% 24.93 kg/m2        Blood Pressure from Last 3 Encounters:   08/16/18 127/82   06/14/18 142/88   05/09/18 134/89    Weight from Last 3 Encounters:   08/16/18 143 lb (64.9 kg)   06/14/18 143 lb (64.9 kg)   05/09/18 142 lb 4 oz (64.5 kg)              Today, you had the following     No orders found for display       Primary Care Provider Office Phone # Fax #    Mira DAGO Draper [phone] [address] [facility] [address]        Equal Access to Services     AUBREY SWAN : Hadii amirah olivares hadasho Soomaali, waaxda luqadaha, qaybta kaalmada adecameronyadonato, stacey john . So [facility] [phone].    ATENCIÓN: Si habla español, tiene a davis disposición servicios gratuitos de asistencia lingüística. Autumn al [phone].    We comply with applicable federal civil rights laws and [place] laws. We do not discriminate on the basis of race, color, national origin, age, disability, sex, sexual orientation, or gender identity.            Thank you!     Thank you for choosing [facility]  for your care. Our goal is always to provide you with excellent care. Hearing back from our patients is one way we can continue to improve our services. Please take a few minutes to complete the written survey that you may receive in the mail after your visit with us. Thank you!             Your Updated Medication List - Protect others around you: Learn how to safely use, store and throw away your medicines at www.disposemymeds.org.          This list is accurate as of 8/16/18 10:03 AM.  Always use your most recent med list.                   Brand Name Dispense Instructions for use Diagnosis    calcium carbonate 500 MG tablet    OS-FADY 500 mg [place]. Ca    100 tablet    Take  1 tablet by mouth daily.        DULoxetine 30 MG EC capsule    CYMBALTA    270 capsule    Take 3 capsules (90 mg) by mouth daily    Major depressive disorder, recurrent episode, moderate (H)       losartan 50 MG tablet    COZAAR    90 tablet    Take 1 tablet (50 mg) by mouth daily    Hypertension goal BP (blood pressure) < 140/90       Multi-vitamin Tabs tablet   Generic drug:  multivitamin, therapeutic with minerals     100    as directed    Routine general medical examination at a health care facility       penciclovir 1 % cream    DENAVIR    5 g    Apply topically every 2 hours (while awake)    Cold sore       valACYclovir 1000 mg tablet    VALTREX    20 tablet    Take 2 tablets (2,000 mg) by mouth 2 times daily for 1 day    Cold sore       verapamil 240 MG CR tablet    CALAN-SR    90 tablet    Take 1 tablet (240 mg) by mouth daily    Hypertension goal BP (blood pressure) < 140/90, Paroxysmal supraventricular tachycardia (H)

## 2018-08-16 NOTE — PROGRESS NOTES
[address] [address]  [phone]  Dept: [phone]    PRE-OP EVALUATION:  Today's date: 2018    Kerri Timmons (: 1960) presents for pre-operative evaluation assessment as requested by Dr. Johny Daniels.  She requires evaluation and anesthesia risk assessment prior to undergoing surgery/procedure for treatment of  PHACOEMULSIFICATION WITH STANDARD INTRAOCULAR LENS IMPLANT - RIGHT    Fax number for surgical facility: [phone]  Primary Physician: Mira Draper  Type of Anesthesia Anticipated: Local with MAC and Topical    Patient has a Health Care Directive or Living Will:  NO    Preop Questions 2018   Who is doing your surgery? DR Johny Daniels   What are you having done? Posterior subcapsular age related cataract   Date of Surgery/Procedure: 18   Facility or Hospital where procedure/surgery will be performed: [facility]   1.  Do you have a history of Heart attack, stroke, stent, coronary bypass surgery, or other heart surgery? No   2.  Do you ever have any pain or discomfort in your chest? No   3.  Do you have a history of  Heart Failure? No   4.   Are you troubled by shortness of breath when:  walking on a level surface, or up a slight hill, or at night? No   5.  Do you currently have a cold, bronchitis or other respiratory infection? No   6.  Do you have a cough, shortness of breath, or wheezing? No   7.  Do you sometimes get pains in the calves of your legs when you walk? No   8. Do you or anyone in your family have previous history of blood clots? No   9.  Do you or does anyone in your family have a serious bleeding problem such as prolonged bleeding following surgeries or cuts? No   10. Have you ever had problems with anemia or been told to take iron pills? No   11. Have you had any abnormal blood loss such as black, tarry or bloody stools, or abnormal vaginal bleeding? No   12. Have you  ever had a blood transfusion? No   13. Have you or any of your relatives ever had problems with anesthesia? No   14. Do you have sleep apnea, excessive snoring or daytime drowsiness? No   15. Do you have any prosthetic heart valves? No   16. Do you have prosthetic joints? No   17. Is there any chance that you may be pregnant? No         HPI:     HPI related to upcoming procedure: right cataract      See problem list for active medical problems.  Problems all longstanding and stable, except as noted/documented.  See ROS for pertinent symptoms related to these conditions.                                                                                                                                                          .    MEDICAL HISTORY:     Patient Active Problem List    Diagnosis Date Noted     Major depressive disorder, recurrent episode, moderate (H) 08/22/2017     Priority: Medium     Hypertension goal BP (blood pressure) < 140/90 07/19/2017     Priority: Medium     Cataract, right eye 06/17/2017     Priority: Medium     Post concussive syndrome 01/04/2012     Priority: Medium     [place] (hard of hearing) 01/04/2012     Priority: Medium     Enthesopathy 10/12/2011     Priority: Medium     Problem list name updated by automated process. Provider to review       Health Care Home 09/07/2011     Priority: Medium     X-Contacted the Patient. Care Coordinator introduced self and the program. Patient declined to participate. Will not open to care coordination.             DX V65.8 REPLACED WITH 14229 HEALTH CARE HOME (04/08/2013)       ASCUS  Pap smear; + high risk HPV DNA 01/20/2011     Priority: Medium     11/30/10: Pap - ASCUS, + HPV 58  1/20/11: [facility] - E[facility] - WNL. Plan pap in 6 months.  6/13/11: Pap - NIL. Plan pap in 6 months.  1/4/12: Pap - NIL.  Plan pap in 1 yr.  5/15/14: NIL pap, neg HPV, endometrial cells noted with LMP of 5/13/14. Plan pap.   7/2015: NIL pap. Plan pap in 3 years.                CARDIOVASCULAR SCREENING; LDL GOAL LESS THAN 160 10/31/2010     Priority: Medium     Cold sore 11/12/2009     Priority: Medium     Paroxysmal supraventricular tachycardia (H) 01/22/2009     Priority: Medium     Abnormal maternal glucose tolerance, antepartum 09/18/2006     Priority: Medium     Malaise and fatigue 09/18/2006     Priority: Medium     Problem list name updated by automated process. Provider to review       Disturbance in sleep behavior 09/18/2006     Priority: Medium     Problem list name updated by automated process. Provider to review        Past Medical History:   Diagnosis Date     ASCUS on Pap smear 11/30/2010    colp wnl     Cataract, right eye 06/17/2017     Depressive disorder      SVT (supraventricular tachycardia) (H) 2008     Past Surgical History:   Procedure Laterality Date     APPENDECTOMY       CHOLECYSTECTOMY       Current Outpatient Prescriptions   Medication Sig Dispense Refill     calcium carbonate 500 MG tablet Take 1 tablet by mouth daily. 100 tablet 3     DULoxetine (CYMBALTA) 30 MG EC capsule Take 3 capsules (90 mg) by mouth daily 270 capsule 3     losartan (COZAAR) 50 MG tablet Take 1 tablet (50 mg) by mouth daily 90 tablet PRN     MULTI-VITAMIN OR TABS as directed 100 0     penciclovir (DENAVIR) 1 % cream Apply topically every 2 hours (while awake) 5 g 3     valACYclovir (VALTREX) 1000 mg tablet Take 2 tablets (2,000 mg) by mouth 2 times daily for 1 day 20 tablet PRN     verapamil (CALAN-SR) 240 MG CR tablet Take 1 tablet (240 mg) by mouth daily 90 tablet PRN     OTC products: None, except as noted above, no recent use of OTC ASA, NSAIDS or Steroids and no use of herbal medications or other supplements    Allergies   Allergen Reactions     No Known Drug Allergies       Latex Allergy: NO    Social History   Substance Use Topics     Smoking status: Light Tobacco Smoker     Types: Cigarettes     Smokeless tobacco: Never Used      Comment: 6 per week      Alcohol use Yes       Comment: a couple drinks 3 times per week      History   Drug Use No       REVIEW OF SYSTEMS:   Constitutional, neuro, ENT, endocrine, pulmonary, cardiac, gastrointestinal, genitourinary, musculoskeletal, integument and psychiatric systems are negative, except as otherwise noted.    EXAM:   /82 (BP Location: Right arm, Patient Position: Sitting, Cuff Size: Adult Regular)  Pulse 70  Temp 98.1  F (36.7  C) (Oral)  Resp 18  Wt 143 lb (64.9 kg)  SpO2 97%  BMI 24.93 kg/m2    GENERAL APPEARANCE: healthy, alert and no distress     EYES: right cataract.  EOMI, PERRL     HENT: ear canals and TM's normal and nose and mouth without ulcers or lesions     NECK: no adenopathy, no asymmetry, masses, or scars and thyroid normal to palpation     RESP: lungs clear to auscultation - no rales, rhonchi or wheezes     CV: regular rates and rhythm, normal S1 S2, no S3 or S4 and no murmur, click or rub     ABDOMEN:  soft, nontender, no HSM or masses and bowel sounds normal     MS: extremities normal- no gross deformities noted, no evidence of inflammation in joints, FROM in all extremities.     SKIN: no suspicious lesions or rashes     NEURO: Normal strength and tone, sensory exam grossly normal, mentation intact and speech normal     PSYCH: mentation appears normal. and affect normal/bright     LYMPHATICS: No cervical adenopathy    DIAGNOSTICS:   No labs or EKG required for low risk surgery (cataract, skin procedure, breast biopsy, etc)    Recent Labs   Lab Test 05/30/18 08/22/17   0944  07/19/17   1047  08/16/16   0939   11/12/12   0822  01/04/12   0935   11/30/10   0916   HGB   --    --    --    --    --   13.1  13.7   --   13.4   PLT   --    --    --    --    --    --   254   --   259   INR  1.0   --    --    --    --    --    --    --    --    NA   --    --   139  137   < >   --   142   < >  138   POTASSIUM  3.3*   --   3.6  4.1   < >   --   4.0   < >  4.2   CR  0.63   --   0.76  0.62   < >   --   0.61   < >  0.67   A1C    --   5.6   --   5.8   < >   --    --    --    --     < > = values in this interval not displayed.        IMPRESSION:   Reason for surgery/procedure: right cataract  Diagnosis/reason for consult: preop exam     The proposed surgical procedure is considered LOW risk.    REVISED CARDIAC RISK INDEX  The patient has the following serious cardiovascular risks for perioperative complications such as (MI, PE, VFib and 3  AV Block):  No serious cardiac risks  INTERPRETATION: 0 risks: Class I (very low risk - 0.4% complication rate)    The patient has the following additional risks for perioperative complications:  No identified additional risks      ICD-10-CM    1. Preop general physical exam Z01.818    2. Cataract of right eye, unspecified cataract type H26.9        RECOMMENDATIONS:       --Patient is to take all scheduled medications on the day of surgery EXCEPT for modifications listed below.    APPROVAL GIVEN to proceed with proposed procedure, without further diagnostic evaluation       Signed Electronically by: KELVIN Gallagher CNP    Copy of this evaluation report is provided to requesting physician.    [facility] Preop Guidelines    Revised Cardiac Risk Index

## 2018-08-20 DIAGNOSIS — H26.9 CATARACT, CORTICAL, RIGHT EYE: Primary | ICD-10-CM

## 2018-08-20 RX ORDER — KETOROLAC TROMETHAMINE 5 MG/ML
1 SOLUTION OPHTHALMIC 4 TIMES DAILY
Qty: 5 ML | Refills: 0 | Status: SHIPPED | OUTPATIENT
Start: 2018-08-20 | End: 2018-10-19

## 2018-08-20 RX ORDER — PREDNISOLONE ACETATE 10 MG/ML
1 SUSPENSION/ DROPS OPHTHALMIC 4 TIMES DAILY
Qty: 5 ML | Refills: 0 | Status: SHIPPED | OUTPATIENT
Start: 2018-08-20 | End: 2018-10-19

## 2018-08-20 RX ORDER — OFLOXACIN 3 MG/ML
1 SOLUTION/ DROPS OPHTHALMIC 4 TIMES DAILY
Qty: 1 BOTTLE | Refills: 0 | Status: SHIPPED | OUTPATIENT
Start: 2018-08-20 | End: 2018-10-19

## 2018-08-21 ENCOUNTER — ANESTHESIA EVENT (OUTPATIENT)
Dept: SURGERY | Facility: AMBULATORY SURGERY CENTER | Age: 58
End: 2018-08-21

## 2018-08-22 ENCOUNTER — SURGERY (OUTPATIENT)
Age: 58
End: 2018-08-22

## 2018-08-22 ENCOUNTER — ANESTHESIA (OUTPATIENT)
Dept: SURGERY | Facility: AMBULATORY SURGERY CENTER | Age: 58
End: 2018-08-22

## 2018-08-22 ENCOUNTER — HOSPITAL ENCOUNTER (OUTPATIENT)
Facility: AMBULATORY SURGERY CENTER | Age: 58
End: 2018-08-22
Attending: OPHTHALMOLOGY
Payer: COMMERCIAL

## 2018-08-22 VITALS
OXYGEN SATURATION: 99 % | HEIGHT: 64 IN | RESPIRATION RATE: 16 BRPM | WEIGHT: 145 LBS | BODY MASS INDEX: 24.75 KG/M2 | SYSTOLIC BLOOD PRESSURE: 126 MMHG | TEMPERATURE: 98.2 F | DIASTOLIC BLOOD PRESSURE: 84 MMHG | HEART RATE: 60 BPM

## 2018-08-22 DIAGNOSIS — H25.011 CORTICAL AGE-RELATED CATARACT OF RIGHT EYE: Primary | ICD-10-CM

## 2018-08-22 DEVICE — EYE IMP IOL AMO PCL TECNIS ZCB00 21.5: Type: IMPLANTABLE DEVICE | Site: EYE | Status: FUNCTIONAL

## 2018-08-22 RX ORDER — NALOXONE HYDROCHLORIDE 0.4 MG/ML
.1-.4 INJECTION, SOLUTION INTRAMUSCULAR; INTRAVENOUS; SUBCUTANEOUS
Status: DISCONTINUED | OUTPATIENT
Start: 2018-08-22 | End: 2018-08-23 | Stop reason: HOSPADM

## 2018-08-22 RX ORDER — PROPARACAINE HYDROCHLORIDE 5 MG/ML
1 SOLUTION/ DROPS OPHTHALMIC ONCE
Status: COMPLETED | OUTPATIENT
Start: 2018-08-22 | End: 2018-08-22

## 2018-08-22 RX ORDER — SODIUM CHLORIDE, SODIUM LACTATE, POTASSIUM CHLORIDE, CALCIUM CHLORIDE 600; 310; 30; 20 MG/100ML; MG/100ML; MG/100ML; MG/100ML
INJECTION, SOLUTION INTRAVENOUS CONTINUOUS
Status: DISCONTINUED | OUTPATIENT
Start: 2018-08-22 | End: 2018-08-23 | Stop reason: HOSPADM

## 2018-08-22 RX ORDER — HYDROMORPHONE HYDROCHLORIDE 1 MG/ML
.3-.5 INJECTION, SOLUTION INTRAMUSCULAR; INTRAVENOUS; SUBCUTANEOUS EVERY 10 MIN PRN
Status: DISCONTINUED | OUTPATIENT
Start: 2018-08-22 | End: 2018-08-23 | Stop reason: HOSPADM

## 2018-08-22 RX ORDER — FENTANYL CITRATE 50 UG/ML
25-50 INJECTION, SOLUTION INTRAMUSCULAR; INTRAVENOUS EVERY 5 MIN PRN
Status: DISCONTINUED | OUTPATIENT
Start: 2018-08-22 | End: 2018-08-23 | Stop reason: HOSPADM

## 2018-08-22 RX ORDER — PREDNISOLONE ACETATE 1 %
SUSPENSION, DROPS(FINAL DOSAGE FORM)(ML) OPHTHALMIC (EYE) PRN
Status: DISCONTINUED | OUTPATIENT
Start: 2018-08-22 | End: 2018-08-22 | Stop reason: HOSPADM

## 2018-08-22 RX ORDER — LIDOCAINE HYDROCHLORIDE 10 MG/ML
INJECTION, SOLUTION EPIDURAL; INFILTRATION; INTRACAUDAL; PERINEURAL PRN
Status: DISCONTINUED | OUTPATIENT
Start: 2018-08-22 | End: 2018-08-22 | Stop reason: HOSPADM

## 2018-08-22 RX ORDER — FENTANYL CITRATE 50 UG/ML
INJECTION, SOLUTION INTRAMUSCULAR; INTRAVENOUS PRN
Status: DISCONTINUED | OUTPATIENT
Start: 2018-08-22 | End: 2018-08-22

## 2018-08-22 RX ORDER — TETRACAINE HYDROCHLORIDE 5 MG/ML
SOLUTION OPHTHALMIC PRN
Status: DISCONTINUED | OUTPATIENT
Start: 2018-08-22 | End: 2018-08-22 | Stop reason: HOSPADM

## 2018-08-22 RX ORDER — TROPICAMIDE 10 MG/ML
1 SOLUTION/ DROPS OPHTHALMIC
Status: COMPLETED | OUTPATIENT
Start: 2018-08-22 | End: 2018-08-22

## 2018-08-22 RX ORDER — CYCLOPENTOLATE HYDROCHLORIDE 10 MG/ML
1 SOLUTION/ DROPS OPHTHALMIC
Status: COMPLETED | OUTPATIENT
Start: 2018-08-22 | End: 2018-08-22

## 2018-08-22 RX ORDER — BALANCED SALT SOLUTION 6.4; .75; .48; .3; 3.9; 1.7 MG/ML; MG/ML; MG/ML; MG/ML; MG/ML; MG/ML
SOLUTION OPHTHALMIC PRN
Status: DISCONTINUED | OUTPATIENT
Start: 2018-08-22 | End: 2018-08-22 | Stop reason: HOSPADM

## 2018-08-22 RX ORDER — ONDANSETRON 2 MG/ML
4 INJECTION INTRAMUSCULAR; INTRAVENOUS EVERY 30 MIN PRN
Status: DISCONTINUED | OUTPATIENT
Start: 2018-08-22 | End: 2018-08-23 | Stop reason: HOSPADM

## 2018-08-22 RX ORDER — LIDOCAINE 40 MG/G
CREAM TOPICAL
Status: DISCONTINUED | OUTPATIENT
Start: 2018-08-22 | End: 2018-08-23 | Stop reason: HOSPADM

## 2018-08-22 RX ORDER — PHENYLEPHRINE HYDROCHLORIDE 25 MG/ML
1 SOLUTION/ DROPS OPHTHALMIC
Status: COMPLETED | OUTPATIENT
Start: 2018-08-22 | End: 2018-08-22

## 2018-08-22 RX ORDER — ONDANSETRON 4 MG/1
4 TABLET, ORALLY DISINTEGRATING ORAL EVERY 30 MIN PRN
Status: DISCONTINUED | OUTPATIENT
Start: 2018-08-22 | End: 2018-08-23 | Stop reason: HOSPADM

## 2018-08-22 RX ORDER — ACETAMINOPHEN 325 MG/1
975 TABLET ORAL ONCE
Status: COMPLETED | OUTPATIENT
Start: 2018-08-22 | End: 2018-08-22

## 2018-08-22 RX ORDER — ONDANSETRON 2 MG/ML
INJECTION INTRAMUSCULAR; INTRAVENOUS PRN
Status: DISCONTINUED | OUTPATIENT
Start: 2018-08-22 | End: 2018-08-22

## 2018-08-22 RX ADMIN — PHENYLEPHRINE HYDROCHLORIDE 1 DROP: 25 SOLUTION/ DROPS OPHTHALMIC at 10:39

## 2018-08-22 RX ADMIN — TROPICAMIDE 1 DROP: 10 SOLUTION/ DROPS OPHTHALMIC at 10:32

## 2018-08-22 RX ADMIN — TROPICAMIDE 1 DROP: 10 SOLUTION/ DROPS OPHTHALMIC at 10:46

## 2018-08-22 RX ADMIN — CYCLOPENTOLATE HYDROCHLORIDE 1 DROP: 10 SOLUTION/ DROPS OPHTHALMIC at 10:46

## 2018-08-22 RX ADMIN — TETRACAINE HYDROCHLORIDE 1 DROP: 5 SOLUTION OPHTHALMIC at 13:31

## 2018-08-22 RX ADMIN — PROPARACAINE HYDROCHLORIDE 1 DROP: 5 SOLUTION/ DROPS OPHTHALMIC at 10:32

## 2018-08-22 RX ADMIN — CYCLOPENTOLATE HYDROCHLORIDE 1 DROP: 10 SOLUTION/ DROPS OPHTHALMIC at 10:32

## 2018-08-22 RX ADMIN — PHENYLEPHRINE HYDROCHLORIDE 1 DROP: 25 SOLUTION/ DROPS OPHTHALMIC at 10:32

## 2018-08-22 RX ADMIN — ACETAMINOPHEN 975 MG: 325 TABLET ORAL at 10:33

## 2018-08-22 RX ADMIN — ONDANSETRON 4 MG: 2 INJECTION INTRAMUSCULAR; INTRAVENOUS at 13:12

## 2018-08-22 RX ADMIN — SODIUM CHLORIDE, SODIUM LACTATE, POTASSIUM CHLORIDE, CALCIUM CHLORIDE: 600; 310; 30; 20 INJECTION, SOLUTION INTRAVENOUS at 11:00

## 2018-08-22 RX ADMIN — Medication 1 DROP: at 13:32

## 2018-08-22 RX ADMIN — TROPICAMIDE 1 DROP: 10 SOLUTION/ DROPS OPHTHALMIC at 10:39

## 2018-08-22 RX ADMIN — PHENYLEPHRINE HYDROCHLORIDE 1 DROP: 25 SOLUTION/ DROPS OPHTHALMIC at 10:46

## 2018-08-22 RX ADMIN — FENTANYL CITRATE 25 MCG: 50 INJECTION, SOLUTION INTRAMUSCULAR; INTRAVENOUS at 13:19

## 2018-08-22 RX ADMIN — LIDOCAINE HYDROCHLORIDE 1 ML: 10 INJECTION, SOLUTION EPIDURAL; INFILTRATION; INTRACAUDAL; PERINEURAL at 13:25

## 2018-08-22 RX ADMIN — Medication 500 ML: at 13:24

## 2018-08-22 RX ADMIN — FENTANYL CITRATE 25 MCG: 50 INJECTION, SOLUTION INTRAMUSCULAR; INTRAVENOUS at 13:15

## 2018-08-22 RX ADMIN — BALANCED SALT SOLUTION 15 ML: 6.4; .75; .48; .3; 3.9; 1.7 SOLUTION OPHTHALMIC at 13:25

## 2018-08-22 RX ADMIN — CYCLOPENTOLATE HYDROCHLORIDE 1 DROP: 10 SOLUTION/ DROPS OPHTHALMIC at 10:39

## 2018-08-22 ASSESSMENT — LIFESTYLE VARIABLES: TOBACCO_USE: 0

## 2018-08-22 ASSESSMENT — ENCOUNTER SYMPTOMS: DYSRHYTHMIAS: 1

## 2018-08-22 ASSESSMENT — COPD QUESTIONNAIRES: COPD: 0

## 2018-08-22 NOTE — ANESTHESIA POSTPROCEDURE EVALUATION
Patient: Kerri Timmons    Procedure(s):  Right Eye Phacoemulsification with Standard Intraocular Lens - Wound Class: I-Clean    Diagnosis:Cataract  Diagnosis Additional Information: No value filed.    Anesthesia Type:  MAC    Note:  Anesthesia Post Evaluation    Patient location during evaluation: Phase 2  Patient participation: Able to fully participate in evaluation  Level of consciousness: awake and alert  Pain management: adequate  Airway patency: patent  Cardiovascular status: acceptable  Respiratory status: acceptable  Hydration status: acceptable  PONV: none     Anesthetic complications: None          Last vitals:  Vitals:    08/22/18 1004 08/22/18 1343   BP: 138/88 137/84   Pulse: 64 61   Resp: 16 16   Temp: 36.6  C (97.8  F) 36.8  C (98.2  F)   SpO2: 98% 97%         Electronically Signed By: Gamal Randall MD  August 22, 2018  1:49 PM

## 2018-08-22 NOTE — IP AVS SNAPSHOT
[facility]    [address] [place] [address]    Phone:  [phone]    Fax:  [phone]                                       After Visit Summary   8/22/2018    Kerri Timmons    MRN: [id number]           After Visit Summary Signature Page     I have received my discharge instructions, and my questions have been answered. I have discussed any challenges I see with this plan with the nurse or doctor.    ..........................................................................................................................................  Patient/Patient Representative Signature      ..........................................................................................................................................  Patient Representative Print Name and Relationship to Patient    ..................................................               ................................................  Date                                            Time    ..........................................................................................................................................  Reviewed by Signature/Title    ...................................................              ..............................................  Date                                                            Time

## 2018-08-22 NOTE — ANESTHESIA CARE TRANSFER NOTE
Patient: Kerri Timmons    Procedure(s):  Right Eye Phacoemulsification with Standard Intraocular Lens - Wound Class: I-Clean    Diagnosis: Cataract  Diagnosis Additional Information: No value filed.    Anesthesia Type:   MAC     Note:  Airway :Room Air  Patient transferred to:Phase II  Comments: VSS/WNL. Responds well.Handoff Report: Identifed the Patient, Identified the Reponsible Provider, Reviewed the pertinent medical history, Discussed the surgical course, Reviewed Intra-OP anesthesia mangement and issues during anesthesia, Set expectations for post-procedure period and Allowed opportunity for questions and acknowledgement of understanding      Vitals: (Last set prior to Anesthesia Care Transfer)    CRNA VITALS  8/22/2018 1309 - 8/22/2018 1343      8/22/2018             Pulse: 66    SpO2: 100 %    Resp Rate (observed): (!)  1    Resp Rate (set): 10                Electronically Signed By: KELVIN Bello CRNA  August 22, 2018  1:43 PM

## 2018-08-22 NOTE — DISCHARGE INSTRUCTIONS
[facility]    Post Operative Cataract Instructions    Do not rub the eye, keep the eye shield over the operated eye at night for one week.    Start taking eye drops today. Wait 3-4 minutes between drops.     Ocuflox 4x/day to surgical eye   Ketorolac 4x/day to surgical eye   Prednisolone 4x/day to surgical eye    No heavy lifting, no bending down at the waist, no water in the eye.    Take tylenol as directed on the bottle if you have pain.    Please call [phone] if you develop severe pain, decreased vision, redness, or severe sensitivity to light.    Bring your eye drops to your appointment tomorrow.    You have a follow-up appointment with your doctor tomorrow at the [facility].     [facility] [place]  Home Care Following Anesthesia  For 24 hours after surgery:  1. Get plenty of rest.  A responsible adult must stay with you for at least 24 hours after you leave the surgery center.  2. Do not drive or use heavy equipment.  If you have weakness or tingling, don't drive or use heavy equipment until this feeling goes away.   3. Do not drink alcohol.   4. Avoid strenuous or risky activities.  Ask for help when climbing stairs.  5. You may feel lightheaded.  IF so, sit for a few minutes before standing.  Have someone help you get up.   6. If you have nausea (feel sick to your stomach): Drink only clear liquids such as apple juice, ginger ale, broth or 7-Up.  Rest may also help.  Be sure to drink enough fluids.  Move to a regular diet as you feel able.   7. You may have a slight fever.  Call the doctor if your fever is over 100 F (37.7 C) (taken under the tongue) or lasts longer than 24 hours.  8. You may have a dry mouth, a sore throat, muscle aches or trouble sleeping. These should go away after 24 hours.  9. Do not make important or legal decisions.               Tips for taking pain medications  To get the best pain  relief possible, remember these points:    Take pain medications as directed, before pain becomes severe.    Pain medication can upset your stomach: taking it with food may help.    Constipation is a common side effect of pain medication. Drink plenty of  fluids.    Eat foods high in fiber. Take a stool softener if recommended by your doctor or pharmacist.    Do not drink alcohol, drive or operate machinery while taking pain medications.    Ask about other ways to control pain, such as with heat, ice or relaxation.    Tylenol/Acetaminophen Consumption  To help encourage the safe use of acetaminophen, the makers of TYLENOL  have lowered the maximum daily dose for single-ingredient Extra Strength TYLENOL  (acetaminophen) products sold in the [place] from 8 pills per day (4,000 mg) to 6 pills per day (3,000 mg). The dosing interval has also changed from 2 pills every 4-6 hours to 2 pills every 6 hours.    If you feel your pain relief is insufficient, you may take Tylenol/Acetaminophen in addition to your narcotic pain medication.     Be careful not to exceed 3,000 mg of Tylenol/Acetaminophen in a 24 hour period from all sources.    If you are taking extra strength Tylenol/acetaminophen (500 mg), the maximum dose is 6 tablets in 24 hours.    If you are taking regular strength acetaminophen (325 mg), the maximum dose is 9 tablets in 24 hours.    Call a doctor for any of the followin. Signs of infection (fever, growing tenderness at the surgery site, a large amount of drainage or bleeding, severe pain, foul-smelling drainage, redness, swelling).  2. It has been over 8 to 10 hours since surgery and you are still not able to urinate (pass water).  3. Headache for over 24 hours.  4. Numbness, tingling or weakness the day after surgery (if you had spinal anesthesia).  Your doctor is:       Dr. Johny Daniels, Opthalmology: [phone]               Or dial [phone] and ask for the resident on call for:   Ophthalmology  For emergency care, call the:  [facility]:  [phone] (TTY for hearing impaired: [phone])

## 2018-08-22 NOTE — ANESTHESIA PREPROCEDURE EVALUATION
Kerri Timmons is a 57 year old female with a PMH of  Cataract who is scheduled for Procedure(s):  Right Eye Phacoemulsification with Standard Intraocular Lens - Wound Class: I-Clean    NPO Status: Adequate.  > 6 hours solids, > 2 hours clear liquids.       Past Surgical History:   Procedure Laterality Date     APPENDECTOMY       CHOLECYSTECTOMY         Anesthesia Evaluation     . Pt has had prior anesthetic. Type: General    No history of anesthetic complications          ROS/MED HX    ENT/Pulmonary:      (-) tobacco use, asthma and COPD   Neurologic:      (-) CVA, TIA and Neuropathy   Cardiovascular:     (+) hypertension----. : . . . :. dysrhythmias (paroxysmal SVT a few times a decade ago) Other, .      (-) CAD, irregular heartbeat/palpitations and stent   METS/Exercise Tolerance:  >4 METS   Hematologic:        (-) anemia   Musculoskeletal:         GI/Hepatic:        (-) GERD and liver disease   Renal/Genitourinary:      (-) renal disease   Endo:      (-) Type I DM, Type II DM and thyroid disease   Psychiatric:         Infectious Disease:  - neg infectious disease ROS       Malignancy:         Other:                     Physical Exam  Normal systems: cardiovascular, pulmonary and dental    Airway   Mallampati: II  TM distance: >3 FB  Neck ROM: full    Dental     Cardiovascular   Rhythm and rate: regular and normal      Pulmonary    breath sounds clear to auscultation                    Anesthesia Plan      History & Physical Review  History and physical reviewed and following examination; no interval change.    ASA Status:  2 .        Plan for MAC (with GA backup) with Intravenous induction. Maintenance will be TIVA.  Reason for MAC:  Procedure to face, neck, head or breast  PONV prophylaxis:  Ondansetron       Postoperative Care  Postoperative pain management:  IV analgesics.      Consents  Anesthetic plan, risks, benefits and alternatives discussed with:  Patient (Including possibility of intraoperative  awareness or recall.)..          Gamal Randall MD  Staff Anesthesiologist  11:31 AM August 22, 2018                     .

## 2018-08-22 NOTE — OP NOTE
OPHTHALMOLOGY OPERATIVE REPORT    PATIENT NAME: Kerri Timmons  DATE: 8/22/2018    SURGEON:  Johny Daniels MD  ASSISTANT  Tamiko Ramirez MD    PREOP DIAGNOSIS: visually significant nuclear sclerosis cataract right eye  POSTOP DIAGNOSIS: same    ANESTHESIA: MAC topical  COMPLICATIONS: none    LENS SPECIFICATIONS: 21.5 Diopters    INDICATION FOR PROCEDURE  The patient has a visually-significant cataract that has been affecting their activities of daily living. The risks including, but not limited to infection, loss of vision, loss of eye, need for more surgery, and bleeding, along with the benefits, alternatives, expectations, and the procedure itself were discussed at length with the patient who wished to proceed with surgery.    DESCRIPTION OF PROCEDURE  In the preoperative suite, the patient was identified, the surgical site marked and informed consent was obtained. The patient was then brought back to the operative suite where the appropriate anesthesia monitors were connected. The patient's eye was prepped and draped in the usual sterile fashion for ophthalmic surgery.    A scleral fixation ring and a supersharp blade were used to make a paracentesis incision. Aqueous was replaced with preservative free lidocaine followed by viscoat. A 2.8 mm keratome was used to make a temporal, triplanar clear corneal incision. Capsulorrhexis was completed with a bent cystitome and Utrata forceps. Hydrodissection of the nucleus was achieved with the Beltrán cannula. The nucleus was found to move freely within the capsular bag. The cataract was successfully removed.    The irrigation and aspiration handpiece was used to remove the cortex. The capsular bag was filled with Healon. The intraocular lens was injected into the capsular bag. The remaining viscoelastic was removed using irrigation and aspiration. The lens was found to be well-centered and in the capsular bag. BSS on a cannula was used to hydrate the clear corneal  and paracentesis incisions. Weck-mir sponges were used to confirm there were no leaks from the incisions. However a small leak was noted at the main wound. A 10-0 nylon suture was placed through the main wound, and the knot was buried. The wounds were again checked with Weck-mir sponges and found to be watertight.    A shield was taped over the eye. The patient was then taken to the recovery room in stable condition having tolerated the procedure well and discharged home in good condition.    Dr. Johny Daniels was scrubbed and performed the entire case.    I agree with the operative report as detailed above.  I personally performed the surgery and/or was scrubbed in for the operation in its entirety.    Johny Daniels MD

## 2018-08-22 NOTE — IP AVS SNAPSHOT
MRN:[id number]                      After Visit Summary   8/22/2018    Kerri Timmons    MRN: [id number]           Thank you!     Thank you for choosing [facility] for your care. Our goal is always to provide you with excellent care. Hearing back from our patients is one way we can continue to improve our services. Please take a few minutes to complete the written survey that you may receive in the mail after you visit with us. Thank you!        Patient Information     Date Of Birth          1960        About your hospital stay     You were admitted on:  August 22, 2018 [facility]    You were discharged on:  August 22, 2018       Who to Call     For medical emergencies, please call 911.  For non-urgent questions about your medical care, please call your primary care provider or clinic, [phone]  For questions related to your surgery, please call your surgery clinic        Attending Provider     Provider Specialty    Johny Daniels MD Ophthalmology       Primary Care Provider Office Phone # Fax #    Mira Draper [phone]      Your next 10 appointments already scheduled     Aug 23, 2018  1:15 PM CDT   Post-Op with Johny Daniels MD   Eye Clinic ([facility])    [address] C[address] [facility] [address] [place] [address]   [phone]            Aug 28, 2018  8:15 AM CDT   Post-Op with Johny Daniels MD   Eye Clinic ([facility])    Harish Crenshaw[address]  9[facility] [address] [place] [address]   [phone]            Oct 04, 2018 10:30 AM CDT   Post-Op with Johny Daniels MD   Eye Clinic ([facility])    Harish Abel [address]  9[facility] [address] [place] [address]   [phone]            Dec 06, 2018 10:30 AM CST   MA DIAGNOSTIC DIGITAL RIGHT with UCBCMA2   [facility]  "Imaging ([facility])    [address] [place] [id number] [phone]           Do not use any powder, lotion or deodorant under your arms or on your breast. If you do, we will ask you to remove it before your exam.  Wear comfortable, two-piece clothing.  If you have any allergies, tell your care team.  Bring any previous mammograms from other facilities or have them mailed to the breast center.   Three-dimensional (3D) mammograms are available at [place] locations in [facility], [facility], and [place]. [facility] locations include [place] [facility] in [place]. Benefits of 3D mammograms include: - Improved rate of cancer detection - Decreases your chance of having to go back for more tests, which means fewer: - \"False-positive\" results (This means that there is an abnormal area but it isn't cancer.) - Invasive testing procedures, such as a biopsy or surgery - Can provide clearer images of the breast if you have dense breast tissue. 3D mammography is an optional exam that anyone can have with a 2D mammogram. It doesn't replace or take the place of a 2D mammogram. 2D mammograms remain an effective screening test for all women.  Not all insurance companies cover the cost of a 3D mammogram. Check with your insurance.            Dec 06, 2018 11:00 AM CST   US BREAST RIGHT LIMITED 1-3 QUAD with UCBCUS2   [facility] ([facility])    [address] [place] [id number] [phone]           Please bring a list of your medicines (including vitamins, minerals and over-the-counter drugs). Also, tell your doctor about any allergies you may have. Wear comfortable clothes and leave your valuables at home.  You do not need to do anything special to prepare for your exam.  Please call the Imaging Department at your exam site with any " questions.              Further instructions from your care team         [facility]    Post Operative Cataract Instructions    Do not rub the eye, keep the eye shield over the operated eye at night for one week.    Start taking eye drops today. Wait 3-4 minutes between drops.     Ocuflox 4x/day to surgical eye   Ketorolac 4x/day to surgical eye   Prednisolone 4x/day to surgical eye    No heavy lifting, no bending down at the waist, no water in the eye.    Take tylenol as directed on the bottle if you have pain.    Please call [phone] if you develop severe pain, decreased vision, redness, or severe sensitivity to light.    Bring your eye drops to your appointment tomorrow.    You have a follow-up appointment with your doctor tomorrow at the [facility] clinic.     [facility]  Home Care Following Anesthesia  For 24 hours after surgery:  1. Get plenty of rest.  A responsible adult must stay with you for at least 24 hours after you leave the surgery center.  2. Do not drive or use heavy equipment.  If you have weakness or tingling, don't drive or use heavy equipment until this feeling goes away.   3. Do not drink alcohol.   4. Avoid strenuous or risky activities.  Ask for help when climbing stairs.  5. You may feel lightheaded.  IF so, sit for a few minutes before standing.  Have someone help you get up.   6. If you have nausea (feel sick to your stomach): Drink only clear liquids such as apple juice, ginger ale, broth or 7-Up.  Rest may also help.  Be sure to drink enough fluids.  Move to a regular diet as you feel able.   7. You may have a slight fever.  Call the doctor if your fever is over 100 F (37.7 C) (taken under the tongue) or lasts longer than 24 hours.  8. You may have a dry mouth, a sore throat, muscle aches or trouble sleeping. These should go away after 24 hours.  9. Do not make important or legal decisions.                Tips for taking pain medications  To get the best pain relief possible, remember these points:    Take pain medications as directed, before pain becomes severe.    Pain medication can upset your stomach: taking it with food may help.    Constipation is a common side effect of pain medication. Drink plenty of  fluids.    Eat foods high in fiber. Take a stool softener if recommended by your doctor or pharmacist.    Do not drink alcohol, drive or operate machinery while taking pain medications.    Ask about other ways to control pain, such as with heat, ice or relaxation.    Tylenol/Acetaminophen Consumption  To help encourage the safe use of acetaminophen, the makers of TYLENOL  have lowered the maximum daily dose for single-ingredient Extra Strength TYLENOL  (acetaminophen) products sold in the [place] from 8 pills per day (4,000 mg) to 6 pills per day (3,000 mg). The dosing interval has also changed from 2 pills every 4-6 hours to 2 pills every 6 hours.    If you feel your pain relief is insufficient, you may take Tylenol/Acetaminophen in addition to your narcotic pain medication.     Be careful not to exceed 3,000 mg of Tylenol/Acetaminophen in a 24 hour period from all sources.    If you are taking extra strength Tylenol/acetaminophen (500 mg), the maximum dose is 6 tablets in 24 hours.    If you are taking regular strength acetaminophen (325 mg), the maximum dose is 9 tablets in 24 hours.    Call a doctor for any of the followin. Signs of infection (fever, growing tenderness at the surgery site, a large amount of drainage or bleeding, severe pain, foul-smelling drainage, redness, swelling).  2. It has been over 8 to 10 hours since surgery and you are still not able to urinate (pass water).  3. Headache for over 24 hours.  4. Numbness, tingling or weakness the day after surgery (if you had spinal anesthesia).  Your doctor is:       Dr. Johny Daniels, Opthalmology: [phone]               Or sushila  "[phone] and ask for the resident on call for:  Ophthalmology  For emergency care, call the:  [facility]:  [phone] (TTY for hearing impaired: [phone])                Pending Results     No orders found from 8/20/2018 to 8/23/2018.            Admission Information     Date & Time Provider Department Dept. Phone    8/22/2018 Johny Daniels MD [facility] [phone]      Your Vitals Were     Blood Pressure Pulse Temperature Respirations Height Weight    137/84 61 98.2  F (36.8  C) (Temporal) 16 1.626 m (5' 4\") 65.8 kg (145 lb)    Pulse Oximetry BMI (Body Mass Index)                97% 24.89 kg/m2          [employer] Information     [employer] gives you secure access to your electronic health record. If you see a primary care provider, you can also send messages to your care team and make appointments. If you have questions, please call your primary care clinic.  If you do not have a primary care provider, please call [phone] and they will assist you.      [employer] is an electronic gateway that provides easy, online access to your medical records. With [employer], you can request a clinic appointment, read your test results, renew a prescription or communicate with your care team.     To access your existing account, please contact your [facility] or call [phone] for assistance.        Care EveryWhere ID     This is your Care EveryWhere ID. This could be used by other organizations to access your [place] medical records  [id number]        Equal Access to Services     AUBREY SWAN AH: Hadii aad ku hadasho Soomaali, waaxda luqadaha, qaybta kaalmada adeegyada, stacey john . So [facility] [phone].    ATENCIÓN: Si habla español, tiene a davis disposición servicios gratuitos de asistencia lingüística. Llame al [phone].    We comply with applicable federal civil rights laws and [place] laws. We do " not discriminate on the basis of race, color, national origin, age, disability, sex, sexual orientation, or gender identity.               Review of your medicines      UNREVIEWED medicines. Ask your doctor about these medicines        Dose / Directions    calcium carbonate 500 MG tablet   Commonly known as:  OS-FADY 500 mg [place]. Ca        Dose:  1 tablet   Take 1 tablet by mouth daily.   Quantity:  100 tablet   Refills:  3       DULoxetine 30 MG EC capsule   Commonly known as:  CYMBALTA   Used for:  Major depressive disorder, recurrent episode, moderate (H)        Dose:  90 mg   Take 3 capsules (90 mg) by mouth daily   Quantity:  270 capsule   Refills:  3       ketorolac 0.5 % ophthalmic solution   Commonly known as:  ACULAR   Used for:  Cataract, cortical, right eye        Dose:  1 drop   Place 1 drop into the right eye 4 times daily Note: it is normal for these eye drops to cause burning   Quantity:  5 mL   Refills:  0       losartan 50 MG tablet   Commonly known as:  COZAAR   Used for:  Hypertension goal BP (blood pressure) < 140/90        Dose:  50 mg   Take 1 tablet (50 mg) by mouth daily   Quantity:  90 tablet   Refills:  PRN       Multi-vitamin Tabs tablet   Used for:  Routine general medical examination at a health care facility   Generic drug:  multivitamin, therapeutic with minerals        as directed   Quantity:  100   Refills:  0       ofloxacin 0.3 % ophthalmic solution   Commonly known as:  OCUFLOX   Used for:  Cataract, cortical, right eye        Dose:  1 drop   Place 1 drop into the right eye 4 times daily   Quantity:  1 Bottle   Refills:  0       penciclovir 1 % cream   Commonly known as:  DENAVIR   Used for:  Cold sore        Apply topically every 2 hours (while awake)   Quantity:  5 g   Refills:  3       PRED FORTE 1 % ophthalmic susp   Used for:  Cataract, cortical, right eye   Generic drug:  prednisoLONE acetate        Dose:  1 drop   Place 1 drop into the right eye 4 times daily Shake well  before using.   Quantity:  5 mL   Refills:  0       valACYclovir 1000 mg tablet   Commonly known as:  VALTREX   Used for:  Cold sore        Dose:  2000 mg   Take 2 tablets (2,000 mg) by mouth 2 times daily for 1 day   Quantity:  20 tablet   Refills:  PRN       verapamil 240 MG CR tablet   Commonly known as:  CALAN-SR   Used for:  Hypertension goal BP (blood pressure) < 140/90, Paroxysmal supraventricular tachycardia (H)        Dose:  240 mg   Take 1 tablet (240 mg) by mouth daily   Quantity:  90 tablet   Refills:  PRN                Protect others around you: Learn how to safely use, store and throw away your medicines at www.disposemymeds.org.             Medication List: This is a list of all your medications and when to take them. Check marks below indicate your daily home schedule. Keep this list as a reference.      Medications           Morning Afternoon Evening Bedtime As Needed    calcium carbonate 500 MG tablet   Commonly known as:  OS-FADY 500 mg [place]. [place]   Take 1 tablet by mouth daily.                                DULoxetine 30 MG EC capsule   Commonly known as:  CYMBALTA   Take 3 capsules (90 mg) by mouth daily                                ketorolac 0.5 % ophthalmic solution   Commonly known as:  ACULAR   Place 1 drop into the right eye 4 times daily Note: it is normal for these eye drops to cause burning                                losartan 50 MG tablet   Commonly known as:  COZAAR   Take 1 tablet (50 mg) by mouth daily                                Multi-vitamin Tabs tablet   as directed   Generic drug:  multivitamin, therapeutic with minerals                                ofloxacin 0.3 % ophthalmic solution   Commonly known as:  OCUFLOX   Place 1 drop into the right eye 4 times daily   Last time this was given:  1 drop on 8/22/2018  1:31 PM                                penciclovir 1 % cream   Commonly known as:  DENAVIR   Apply topically every 2 hours (while awake)                                 PRED FORTE 1 % ophthalmic susp   Place 1 drop into the right eye 4 times daily Shake well before using.   Last time this was given:  1 drop on 8/22/2018  1:32 PM   Generic drug:  prednisoLONE acetate                                valACYclovir 1000 mg tablet   Commonly known as:  VALTREX   Take 2 tablets (2,000 mg) by mouth 2 times daily for 1 day                                verapamil 240 MG CR tablet   Commonly known as:  CALAN-SR   Take 1 tablet (240 mg) by mouth daily

## 2018-08-23 ENCOUNTER — OFFICE VISIT (OUTPATIENT)
Dept: OPHTHALMOLOGY | Facility: CLINIC | Age: 58
End: 2018-08-23
Attending: OPHTHALMOLOGY
Payer: COMMERCIAL

## 2018-08-23 DIAGNOSIS — Z98.890 POSTOPERATIVE EYE STATE: ICD-10-CM

## 2018-08-23 DIAGNOSIS — Z96.1 PSEUDOPHAKIA: Primary | ICD-10-CM

## 2018-08-23 PROCEDURE — G0463 HOSPITAL OUTPT CLINIC VISIT: HCPCS | Mod: ZF

## 2018-08-23 ASSESSMENT — REFRACTION_WEARINGRX
OD_SPHERE: +2.50
OD_CYLINDER: SPHERE
OS_CYLINDER: SPHERE
SPECS_TYPE: OTC READERS
OS_SPHERE: +2.50

## 2018-08-23 ASSESSMENT — VISUAL ACUITY
OS_SC: 20/20
OS_CC: J1+
METHOD: SNELLEN - LINEAR
OD_CC: J1+
OD_SC: 20/30
OS_SC+: -2
OD_SC+: -2

## 2018-08-23 ASSESSMENT — CONF VISUAL FIELD
METHOD: COUNTING FINGERS
OD_NORMAL: 1
OS_NORMAL: 1

## 2018-08-23 ASSESSMENT — EXTERNAL EXAM - RIGHT EYE: OD_EXAM: NORMAL

## 2018-08-23 ASSESSMENT — TONOMETRY
IOP_METHOD: ICARE
OS_IOP_MMHG: 12
OD_IOP_MMHG: 14

## 2018-08-23 ASSESSMENT — SLIT LAMP EXAM - LIDS: COMMENTS: NORMAL

## 2018-08-23 NOTE — PATIENT INSTRUCTIONS
Eye Drop Instructions:    1. Prednisolone (pink/white top) - 1 drop 4 times per day, right eye  2. Ofloxacin (tan top) - 1 drop 4 times per day, right eye  3. Ketorolac (gray top) - 1 drop 4 times per day, right eye    Space out eye drops by 2-3 minutes apart.  No eye rubbing.  No water in the eye.  Wear the eye shield at bedtime and sunglasses whenever outdoors.    If you have any worsening eye pain, flashes/floaters, redness or decreased vision, please call the Eye Clinic at [phone].

## 2018-08-23 NOTE — NURSING NOTE
"Chief Complaints and History of Present Illnesses   Patient presents with     Post Op (Ophthalmology) Right Eye     POD#1 s/p CE/IOL RE     HPI    Affected eye(s):  Right   Symptoms:     Blurred vision   No decreased vision   No distorted vision   No floaters   No flashes   No redness   No itching   No burning      Duration:  1 day   Frequency:  Intermittent       Do you have eye pain now?:  No      Comments:  Wore laboy mask all night w/o problem after sx until this morning. Vision seems normal but is concerned that the gtts \"may be bothering the eye.\"   Gtts, after instillation leave the RE feeling \"heavy and fuzzy.\" Blurriness of vision is mostly at near, \"has since cleared up for the distance.\"     Concerns/Questions of Pt: does order of gtts matter?   How much bending can be done, ie: feeding the dog.   What is the progression of cataract in the LE, when could she schedule CE/IOL sx?   Timeline to resume sporting activities, ie: golf    Ocular meds: pred forte qid RE, ocuflox qid RE, ketorolac qid RE    Christine Harrison COT 2:05 PM August 23, 2018                  "

## 2018-08-23 NOTE — MR AVS SNAPSHOT
After Visit Summary   8/23/2018    Kerri Timmons    MRN: [id number]           Patient Information     Date Of Birth          1960        Visit Information        Provider Department      8/23/2018 1:15 PM Johny Daniels MD Eye Clinic        Today's Diagnoses     Pseudophakia - Right Eye    -  1    Postoperative eye state          Care Instructions    Eye Drop Instructions:    1. Prednisolone (pink/white top) - 1 drop 4 times per day, right eye  2. Ofloxacin (tan top) - 1 drop 4 times per day, right eye  3. Ketorolac (gray top) - 1 drop 4 times per day, right eye    Space out eye drops by 2-3 minutes apart.  No eye rubbing.  No water in the eye.  Wear the eye shield at bedtime and sunglasses whenever outdoors.    If you have any worsening eye pain, flashes/floaters, redness or decreased vision, please call the Eye Clinic at [phone].          Follow-ups after your visit        Follow-up notes from your care team     Return for Follow Up as scheduled.      Your next 10 appointments already scheduled     Aug 28, 2018  8:15 AM CDT   Post-Op with Johny Daniels MD   Eye Clinic ([facility])    [address] [address]   [phone]            Oct 04, 2018 10:30 AM CDT   Post-Op with Johny Daniels MD   Eye Clinic ([facility])    [address] [address]   [phone]            Dec 06, 2018 10:30 AM [facility]   MA DIAGNOSTIC DIGITAL RIGHT with UCBCMA2   [facility] ([facility])    [address] [address] [phone]           Do not use any powder, lotion or deodorant under your arms or on your breast. If you do, we will ask you to remove it before your exam.  Wear comfortable, two-piece clothing.  If you have any allergies, tell your care team.  Bring any previous  "mammograms from other facilities or have them mailed to the breast center.   Three-dimensional (3D) mammograms are available at [place] locations in [facility], [place], [place], [facility], [place], [place], and [place]. [facility] locations include [place] [facility] in [place]. Benefits of 3D mammograms include: - Improved rate of cancer detection - Decreases your chance of having to go back for more tests, which means fewer: - \"False-positive\" results (This means that there is an abnormal area but it isn't cancer.) - Invasive testing procedures, such as a biopsy or surgery - Can provide clearer images of the breast if you have dense breast tissue. 3D mammography is an optional exam that anyone can have with a 2D mammogram. It doesn't replace or take the place of a 2D mammogram. 2D mammograms remain an effective screening test for all women.  Not all insurance companies cover the cost of a 3D mammogram. Check with your insurance.            Dec 06, 2018 11:00 AM CST   US BREAST RIGHT LIMITED 1-3 QUAD with UCBCUS2   [facility] ([facility])    [address] [address] [phone]           Please bring a list of your medicines (including vitamins, minerals and over-the-counter drugs). Also, tell your doctor about any allergies you may have. Wear comfortable clothes and leave your valuables at home.  You do not need to do anything special to prepare for your exam.  Please call the Imaging Department at your exam site with any questions.              Who to contact     Please call your clinic at [phone] to:    Ask questions about your health    Make or cancel appointments    Discuss your medicines    Learn about your test results    Speak to your doctor            Additional Information About Your Visit        MyChart Information     [employer] gives you secure access to your electronic health " record. If you see a primary care provider, you can also send messages to your care team and make appointments. If you have questions, please call your primary care clinic.  If you do not have a primary care provider, please call [phone] and they will assist you.      [employer] is an electronic gateway that provides easy, online access to your medical records. With [employer], you can request a clinic appointment, read your test results, renew a prescription or communicate with your care team.     To access your existing account, please contact your [facility] or call [phone] for assistance.        Care EveryWhere ID     This is your Care EveryWhere ID. This could be used by other organizations to access your [place] medical records  [id number]         Blood Pressure from Last 3 Encounters:   08/22/18 126/84   08/16/18 127/82   06/14/18 142/88    Weight from Last 3 Encounters:   08/22/18 65.8 kg (145 lb)   08/16/18 64.9 kg (143 lb)   06/14/18 64.9 kg (143 lb)              Today, you had the following     No orders found for display       Primary Care Provider Office Phone # Fax #    Mira Draper [phone]       [address] [facility] [address]        Equal Access to Services     AUBREY SWAN AH: Hadii aad ku hadasho Soomaali, waaxda luqadaha, qaybta kaalmada adeegyada, waxay zuleimain hayaan leida john . So [facility] [phone].    ATENCIÓN: Si habla español, tiene a davis disposición servicios gratuitos de asistencia lingüística. Llame al [phone].    We comply with applicable federal civil rights laws and [place] laws. We do not discriminate on the basis of race, color, national origin, age, disability, sex, sexual orientation, or gender identity.            Thank you!     Thank you for choosing EYE CLINIC  for your care. Our goal is always to provide you with excellent care. Hearing back from our patients is one way we can continue to  improve our services. Please take a few minutes to complete the written survey that you may receive in the mail after your visit with us. Thank you!             Your Updated Medication List - Protect others around you: Learn how to safely use, store and throw away your medicines at www.disposemymeds.org.          This list is accurate as of 8/23/18  2:48 PM.  Always use your most recent med list.                   Brand Name Dispense Instructions for use Diagnosis    calcium carbonate 500 MG tablet    OS-FADY 500 mg [place]. Ca    100 tablet    Take 1 tablet by mouth daily.        DULoxetine 30 MG EC capsule    CYMBALTA    270 capsule    Take 3 capsules (90 mg) by mouth daily    Major depressive disorder, recurrent episode, moderate (H)       ketorolac 0.5 % ophthalmic solution    ACULAR    5 mL    Place 1 drop into the right eye 4 times daily Note: it is normal for these eye drops to cause burning    Cataract, cortical, right eye       losartan 50 MG tablet    COZAAR    90 tablet    Take 1 tablet (50 mg) by mouth daily    Hypertension goal BP (blood pressure) < 140/90       Multi-vitamin Tabs tablet   Generic drug:  multivitamin, therapeutic with minerals     100    as directed    Routine general medical examination at a health care facility       ofloxacin 0.3 % ophthalmic solution    OCUFLOX    1 Bottle    Place 1 drop into the right eye 4 times daily    Cataract, cortical, right eye       penciclovir 1 % cream    DENAVIR    5 g    Apply topically every 2 hours (while awake)    Cold sore       PRED FORTE 1 % ophthalmic susp   Generic drug:  prednisoLONE acetate     5 mL    Place 1 drop into the right eye 4 times daily Shake well before using.    Cataract, cortical, right eye       valACYclovir 1000 mg tablet    VALTREX    20 tablet    Take 2 tablets (2,000 mg) by mouth 2 times daily for 1 day    Cold sore       verapamil 240 MG CR tablet    CALAN-SR    90 tablet    Take 1 tablet (240 mg) by mouth daily     Hypertension goal BP (blood pressure) < 140/90, Paroxysmal supraventricular tachycardia (H)

## 2018-08-23 NOTE — PROGRESS NOTES
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 Has noticed decrease in vision right eye over the past few months, at distance and near. Using OTC readers only currently. No eye pain, flashes/floaters, redness. Having a lot of trouble with depth perception due to blurriness right eye, especially at work in the NICU. Has also noticed problems in the sun when golfing.     Interval Update:  Postoperative day#1 s/p CE/IOL right eye 8/22/18     Gtts:  None      A/P:     1.Pseudophakia, right eye  -POD1 s/p CE/IOL right eye 8/22/18  -doing well  -start pred QID, ketorolac QID, and ofloxacin QID right eye  -tears frequently right eye  -shield at bedtime, no eye rubbing  -return precautions reviewed     2. Nuclear sclerosis, left eye  -moderate, 20/30  -monitor for now, may wish to proceed after right eye      3. Dry eye, each eye  -inf PEE each eye  -continue lubricating tears QID each eye    F/u  1 week    Dane Cedillo MD  Ophthalmology Resident, PGY-3  [facility]

## 2018-08-24 DIAGNOSIS — F33.1 MAJOR DEPRESSIVE DISORDER, RECURRENT EPISODE, MODERATE (H): ICD-10-CM

## 2018-08-25 ENCOUNTER — TELEPHONE (OUTPATIENT)
Dept: OPHTHALMOLOGY | Facility: CLINIC | Age: 58
End: 2018-08-25

## 2018-08-25 NOTE — TELEPHONE ENCOUNTER
Pt calls to ask about right eye bruising after right eye cataract surgery w/ Dr. Daniels. Pt w/ no pain and no change in vision.     Advised pt to she should keep head elevated, apply ice packs hourly, and take tylenol for pain.     Pt will call back if symptoms do not improve over next 2 days. Pt will see Dr. Daniels on 8/28.

## 2018-08-27 RX ORDER — DULOXETIN HYDROCHLORIDE 30 MG/1
CAPSULE, DELAYED RELEASE ORAL
Qty: 270 CAPSULE | Refills: 2 | OUTPATIENT
Start: 2018-08-27

## 2018-08-27 NOTE — TELEPHONE ENCOUNTER
"Requested Prescriptions   Pending Prescriptions Disp Refills     DULoxetine (CYMBALTA) 30 MG EC capsule [Pharmacy Med Name: DULOXETINE HCL DR 30 MG CAP]  Last Written Prescription Date:  5-9-18  Last Fill Quantity: 270,  # refills: 3   Last office visit: 8/16/2018 with prescribing provider:   Jasmine Draper   Future Office Visit:    270 capsule 2     Sig: TAKE 3 CAPSULES (90 MG) BY MOUTH DAILY    Serotonin-Norepinephrine Reuptake Inhibitors  Passed    8/24/2018  1:00 PM       Passed - Blood pressure under 140/90 in past 12 months    BP Readings from Last 3 Encounters:   08/22/18 126/84   08/16/18 127/82   06/14/18 142/88          Passed - PHQ-9 score of less than 5 in past 6 months    Please review last PHQ-9 score.   PHQ-9 SCORE 11/12/2012 7/25/2017 5/9/2018   Total Score 0 - -   Total Score - 0 0     No flowsheet data found.       Passed - Patient is age 18 or older       Passed - No active pregnancy on record       Passed - No positive pregnancy test in past 12 months       Passed - Recent (6 mo) or future (30 days) visit within the authorizing provider's specialty    Patient had office visit in the last 6 months or has a visit in the next 30 days with authorizing provider or within the authorizing provider's specialty.  See \"Patient Info\" tab in inbasket, or \"Choose Columns\" in Meds & Orders section of the refill encounter.              "

## 2018-08-28 ENCOUNTER — OFFICE VISIT (OUTPATIENT)
Dept: OPHTHALMOLOGY | Facility: CLINIC | Age: 58
End: 2018-08-28
Attending: OPHTHALMOLOGY
Payer: COMMERCIAL

## 2018-08-28 DIAGNOSIS — Z96.1 PSEUDOPHAKIA: Primary | ICD-10-CM

## 2018-08-28 PROCEDURE — G0463 HOSPITAL OUTPT CLINIC VISIT: HCPCS | Mod: ZF

## 2018-08-28 ASSESSMENT — EXTERNAL EXAM - RIGHT EYE: OD_EXAM: NORMAL

## 2018-08-28 ASSESSMENT — VISUAL ACUITY
OD_SC+: -1
OS_SC+: -3
OS_SC: 20/20
METHOD: SNELLEN - LINEAR
OD_PH_SC: 20/20
OD_SC: 20/30

## 2018-08-28 ASSESSMENT — TONOMETRY
OS_IOP_MMHG: 15
OD_IOP_MMHG: 15
IOP_METHOD: TONOPEN

## 2018-08-28 ASSESSMENT — CONF VISUAL FIELD
OS_NORMAL: 1
OD_NORMAL: 1
METHOD: COUNTING FINGERS

## 2018-08-28 NOTE — NURSING NOTE
"Chief Complaints and History of Present Illnesses   Patient presents with     Post Op (Ophthalmology) Right Eye      1 week after cataract surgery     HPI    Last Eye Exam:  8/23/18   Affected eye(s):  Right   Symptoms:        Duration:  1 week   Frequency:  Constant       Do you have eye pain now?:  No      Comments:  Kerri is here for her 1 week post op after cataract surgery RE. She says her RE feels \"heavy, different, weird\" and is still healing.   She says vision seems fine RE. Over the past weekend she used cold compresses as directed.     Jose Christie COT 8:31 AM August 28, 2018                "

## 2018-08-28 NOTE — MR AVS SNAPSHOT
"              After Visit Summary   8/28/2018    Kerri Timmons    MRN: [id number]           Patient Information     Date Of Birth          1960        Visit Information        Provider Department      8/28/2018 8:15 AM Johny Daniels MD [facility]        Today's Diagnoses     Pseudophakia - Right Eye    -  1       Follow-ups after your visit        Your next 10 appointments already scheduled     Oct 04, 2018 10:30 AM CDT   Post-Op with Johny Daniels MD   Eye Clinic ([facility])    [address]  [address] [address]  [facility] [id number] [phone]            Dec 06, 2018 10:30 AM CST   MA DIAGNOSTIC DIGITAL RIGHT with UCBCMA2   [facility] ([facility])    [address]  2nd [facility] [id number] [phone]           Do not use any powder, lotion or deodorant under your arms or on your breast. If you do, we will ask you to remove it before your exam.  Wear comfortable, two-piece clothing.  If you have any allergies, tell your care team.  Bring any previous mammograms from other facilities or have them mailed to the breast center.   Three-dimensional (3D) mammograms are available at [facility] locations in [facility], [place], [place], [place], and [place]. [facility] locations include [place] and Clinic & Surgery Center in [place]. Benefits of 3D mammograms include: - Improved rate of cancer detection - Decreases your chance of having to go back for more tests, which means fewer: - \"False-positive\" results (This means that there is an abnormal area but it isn't cancer.) - Invasive testing procedures, such as a biopsy or surgery - Can provide clearer images of the breast if you have dense breast tissue. 3D mammography is an optional exam that anyone can have with a 2D mammogram. It doesn't replace or take the place of a 2D mammogram. 2D mammograms remain " an effective screening test for all women.  Not all insurance companies cover the cost of a 3D mammogram. Check with your insurance.            Dec 06, 2018 11:00 AM CST   US BREAST RIGHT LIMITED 1-3 QUAD with UCBCUS2   [facility] ([facility])    [address]  2nd [facility] [address] [phone]           Please bring a list of your medicines (including vitamins, minerals and over-the-counter drugs). Also, tell your doctor about any allergies you may have. Wear comfortable clothes and leave your valuables at home.  You do not need to do anything special to prepare for your exam.  Please call the Imaging Department at your exam site with any questions.              Who to contact     Please call your clinic at [phone] to:    Ask questions about your health    Make or cancel appointments    Discuss your medicines    Learn about your test results    Speak to your doctor            Additional Information About Your Visit        [employer] Information     [employer] gives you secure access to your electronic health record. If you see a primary care provider, you can also send messages to your care team and make appointments. If you have questions, please call your primary care clinic.  If you do not have a primary care provider, please call [phone] and they will assist you.      [employer] is an electronic gateway that provides easy, online access to your medical records. With [employer], you can request a clinic appointment, read your test results, renew a prescription or communicate with your care team.     To access your existing account, please contact your [facility] or call [phone] for assistance.        Care EveryWhere ID     This is your Care EveryWhere ID. This could be used by other organizations to access your [facility] medical records  [id number]         Blood Pressure from Last 3 Encounters:   08/22/18  126/84   08/16/18 127/82   06/14/18 142/88    Weight from Last 3 Encounters:   08/22/18 65.8 kg (145 lb)   08/16/18 64.9 kg (143 lb)   06/14/18 64.9 kg (143 lb)              Today, you had the following     No orders found for display       Primary Care Provider Office Phone # Fax #    Mira Draper, [phone]       [address] [facility] [address]        Equal Access to Services     ACE SWAN : Hadii aad ku hadasho Soomaali, waaxda luqadaha, qaybta kaalmada adeegyada, stacey john . So [facility] [phone].    ATENCIÓN: Si habla español, tiene a davis disposición servicios gratuitos de asistencia lingüística. [facility] [phone].    We comply with applicable federal civil rights laws and [place] laws. We do not discriminate on the basis of race, color, national origin, age, disability, sex, sexual orientation, or gender identity.            Thank you!     Thank you for choosing EYE CLINIC  for your care. Our goal is always to provide you with excellent care. Hearing back from our patients is one way we can continue to improve our services. Please take a few minutes to complete the written survey that you may receive in the mail after your visit with us. Thank you!             Your Updated Medication List - Protect others around you: Learn how to safely use, store and throw away your medicines at www.disposemymeds.org.          This list is accurate as of 8/28/18  9:10 AM.  Always use your most recent med list.                   Brand Name Dispense Instructions for use Diagnosis    calcium carbonate 500 mg {elemental} 500 MG tablet    OS-FADY    100 tablet    Take 1 tablet by mouth daily.        DULoxetine 30 MG EC capsule    CYMBALTA    270 capsule    Take 3 capsules (90 mg) by mouth daily    Major depressive disorder, recurrent episode, moderate (H)       ketorolac 0.5 % ophthalmic solution    ACULAR    5 mL    Place 1 drop into the right eye 4 times daily  Note: it is normal for these eye drops to cause burning    Cataract, cortical, right eye       losartan 50 MG tablet    COZAAR    90 tablet    Take 1 tablet (50 mg) by mouth daily    Hypertension goal BP (blood pressure) < 140/90       Multi-vitamin Tabs tablet   Generic drug:  multivitamin, therapeutic with minerals     100    as directed    Routine general medical examination at a health care facility       ofloxacin 0.3 % ophthalmic solution    OCUFLOX    1 Bottle    Place 1 drop into the right eye 4 times daily    Cataract, cortical, right eye       penciclovir 1 % cream    DENAVIR    5 g    Apply topically every 2 hours (while awake)    Cold sore       PRED FORTE 1 % ophthalmic susp   Generic drug:  prednisoLONE acetate     5 mL    Place 1 drop into the right eye 4 times daily Shake well before using.    Cataract, cortical, right eye       valACYclovir 1000 mg tablet    VALTREX    20 tablet    Take 2 tablets (2,000 mg) by mouth 2 times daily for 1 day    Cold sore       verapamil 240 MG CR tablet    CALAN-SR    90 tablet    Take 1 tablet (240 mg) by mouth daily    Hypertension goal BP (blood pressure) < 140/90, Paroxysmal supraventricular tachycardia (H)

## 2018-08-28 NOTE — PROGRESS NOTES
Assessment / Plan:    Kerri Timmons is a 57 year old female who is 1 week s/p cataract extraction with intraocular lens placement right eye.    Doing well.    Ecchymosis at lid margin - no block was used - patient notes easy bruising, has had coag w/u which was negative    Tiny lens frag in inferior angle - no adjacent edema, minimal Anterior chamber cell    Medications in the surgical eye:      discontinue oflox   Cont pred / keto four times a day  For now given small lens frag    OK to discontinue eye shield  OK to resume normal activity  Avoid swimming pools, hot tubs, saunas for 3 additional weeks    Return to clinic in approximately 3-4 weeks, earlier as needed.  Plan dilated fundus exam and manifest refraction in the operative eye at next visit.      ~~~~~~~~~~~~~~~~~~~~~~~~~~~~~~~~~~~~~~~~~~~~~~~~~~~~~~~~~~~~~~~~    Complete documentation of historical and exam elements from today's encounter can be found in the full encounter summary report (not reduplicated in this progress note). I personally obtained the chief complaint(s) and history of present illness.  I confirmed and edited as necessary the review of systems, past medical/surgical history, family history, social history, and examination findings as documented by others.  I examined the patient myself, and I personally reviewed the relevant tests, images, and reports as documented above. I formulated and edited as necessary the assessment and plan and discussed the findings and management plan with the patient and family.     Johny Daniels MD, MA  Director, Cornea & Anterior Segment  [facility] Department of Ophthalmology & Visual Neuroscience

## 2018-09-15 ENCOUNTER — HEALTH MAINTENANCE LETTER (OUTPATIENT)
Age: 58
End: 2018-09-15

## 2018-09-18 ENCOUNTER — OFFICE VISIT (OUTPATIENT)
Dept: OPHTHALMOLOGY | Facility: CLINIC | Age: 58
End: 2018-09-18
Attending: OPHTHALMOLOGY
Payer: COMMERCIAL

## 2018-09-18 DIAGNOSIS — Z96.1 PSEUDOPHAKIA: Primary | ICD-10-CM

## 2018-09-18 PROCEDURE — G0463 HOSPITAL OUTPT CLINIC VISIT: HCPCS | Mod: ZF

## 2018-09-18 PROCEDURE — 92015 DETERMINE REFRACTIVE STATE: CPT | Mod: 52,ZF

## 2018-09-18 RX ORDER — PREDNISOLONE ACETATE 10 MG/ML
1-2 SUSPENSION/ DROPS OPHTHALMIC 3 TIMES DAILY
Qty: 1 BOTTLE | Refills: 1 | Status: SHIPPED | OUTPATIENT
Start: 2018-09-18 | End: 2018-10-19

## 2018-09-18 ASSESSMENT — REFRACTION_MANIFEST
OD_AXIS: 155
OD_SPHERE: PLANO
OD_ADD: +2.50
OD_CYLINDER: +0.25

## 2018-09-18 ASSESSMENT — VISUAL ACUITY
OD_SC: 20/30
OS_SC: 20/20
OD_PH_SC: 20/20
METHOD: SNELLEN - LINEAR

## 2018-09-18 ASSESSMENT — EXTERNAL EXAM - RIGHT EYE: OD_EXAM: NORMAL

## 2018-09-18 ASSESSMENT — TONOMETRY
OS_IOP_MMHG: 18
OD_IOP_MMHG: 21
IOP_METHOD: ICARE

## 2018-09-18 NOTE — MR AVS SNAPSHOT
"              After Visit Summary   9/18/2018    Kerri Timmons    MRN: [id number]           Patient Information     Date Of Birth          1960        Visit Information        Provider Department      9/18/2018 8:30 AM Johny Daniels MD [facility] Clinic        Today's Diagnoses     Pseudophakia    -  1      Care Instructions        Continue prednisolone drops three times a day until next visit      Restart ofloxacin drops four times a day for 4 days then stop      Return to clinic in 1 month for recheck          Follow-ups after your visit        Follow-up notes from your care team     Return in about 1 month (around 10/18/2018) for general followup.      Your next 10 appointments already scheduled     Oct 19, 2018  7:30 AM CDT   Post-Op with Johny Daniels MD   Eye Clinic ([facility])    [address]  [facility] [id number] [phone]            Dec 06, 2018 10:30 AM CST   MA DIAGNOSTIC DIGITAL RIGHT with UCBCMA2   [facility] ([facility])    [address]  [facility] [id number] [phone]           Do not use any powder, lotion or deodorant under your arms or on your breast. If you do, we will ask you to remove it before your exam.  Wear comfortable, two-piece clothing.  If you have any allergies, tell your care team.  Bring any previous mammograms from other facilities or have them mailed to the breast center.   Three-dimensional (3D) mammograms are available at [facility] locations in [facility], [place], [place], [facility], [place], [place], and [place]. [facility] locations include [place] and Clinic & Surgery Center in [place]. Benefits of 3D mammograms include: - Improved rate of cancer detection - Decreases your chance of having to go back for more tests, which means fewer: - \"False-positive\" results (This means that there is an abnormal area but " it isn't cancer.) - Invasive testing procedures, such as a biopsy or surgery - Can provide clearer images of the breast if you have dense breast tissue. 3D mammography is an optional exam that anyone can have with a 2D mammogram. It doesn't replace or take the place of a 2D mammogram. 2D mammograms remain an effective screening test for all women.  Not all insurance companies cover the cost of a 3D mammogram. Check with your insurance.            Dec 06, 2018 11:00 AM CST   US BREAST RIGHT LIMITED 1-3 QUAD with UCBCUS2   [facility] ([facility])    [address] 2nd [address]  [facility] [address] [phone]           How do I prepare for my exam? (Food and drink instructions) No Food and Drink Restrictions.  How do I prepare for my exam? (Other instructions) You do not need to do anything special to prepare for your exam.  What should I wear: Wear comfortable clothes.  How long does the exam take: Most ultrasounds take 30 to 60 minutes.  What should I bring: Bring a list of your medicines, including vitamins, minerals and over-the-counter drugs. It is safest to leave personal items at home.  Do I need a :  No  is needed.  What do I need to tell my doctor: Tell your doctor about any allergies you may have.  What should I do after the exam: No restrictions, You may resume normal activities.  What is this test: An ultrasound uses sound waves to make pictures of the body. Sound waves do not cause pain. The only discomfort may be the pressure of the wand against your skin or full bladder.  Who should I call with questions: If you have any questions, please call the Imaging Department where you will have your exam. Directions, parking instructions, and other information is available on our website, [email or web address].              Who to contact     Please call your clinic at [phone] to:    Ask questions about your health    Make or cancel  appointments    Discuss your medicines    Learn about your test results    Speak to your doctor            Additional Information About Your Visit        [employer] Information     [employer] gives you secure access to your electronic health record. If you see a primary care provider, you can also send messages to your care team and make appointments. If you have questions, please call your primary care clinic.  If you do not have a primary care provider, please call [phone] and they will assist you.      [employer] is an electronic gateway that provides easy, online access to your medical records. With [employer], you can request a clinic appointment, read your test results, renew a prescription or communicate with your care team.     To access your existing account, please contact your [facility] or call [phone] for assistance.        Care EveryWhere ID     This is your Care EveryWhere ID. This could be used by other organizations to access your [facility] medical records  [id number]         Blood Pressure from Last 3 Encounters:   08/22/18 126/84   08/16/18 127/82   06/14/18 142/88    Weight from Last 3 Encounters:   08/22/18 65.8 kg (145 lb)   08/16/18 64.9 kg (143 lb)   06/14/18 64.9 kg (143 lb)              Today, you had the following     No orders found for display         Today's Medication Changes          These changes are accurate as of 9/18/18 10:16 AM.  If you have any questions, ask your nurse or doctor.               These medicines have changed or have updated prescriptions.        Dose/Directions    * PRED FORTE 1 % ophthalmic susp   This may have changed:  Another medication with the same name was added. Make sure you understand how and when to take each.   Used for:  Cataract, cortical, right eye   Generic drug:  prednisoLONE acetate   Changed by:  Johny Daniels MD        Dose:  1 drop   Place 1 drop into the right eye 4 times daily Shake well before using.    Quantity:  5 mL   Refills:  0       * prednisoLONE acetate 1 % ophthalmic susp   Commonly known as:  PRED FORTE   This may have changed:  You were already taking a medication with the same name, and this prescription was added. Make sure you understand how and when to take each.   Used for:  Pseudophakia   Changed by:  Johny Daniels MD        Dose:  1-2 drop   Place 1-2 drops into the right eye 3 times daily   Quantity:  1 Bottle   Refills:  1       * Notice:  This list has 2 medication(s) that are the same as other medications prescribed for you. Read the directions carefully, and ask your doctor or other care provider to review them with you.         Where to get your medicines      These medications were sent to [facility]pharmacy #[id number] - [place], [place] - [address], [place] [address]     Phone:  [phone]     prednisoLONE acetate 1 % ophthalmic susp                Primary Care Provider Office Phone # Fax #    Mira LOZA SHANTI Draper [phone]       [address] [facility] [address]        Equal Access to Services     ACE [facility] HospitalY[facility] AH: Hadii aad ku hadasho Soomaali, waaxda luqadaha, qaybta kaalmada adeegyada, waxay jules hayrenee john . So [facility] [phone].    ATENCIÓN: Si habla español, tiene a davis disposición servicios gratuitos de asistencia lingüística. LlCincinnati VA Medical Center [phone].    We comply with applicable federal civil rights laws and [place] laws. We do not discriminate on the basis of race, color, national origin, age, disability, sex, sexual orientation, or gender identity.            Thank you!     Thank you for choosing EYE CLINIC  for your care. Our goal is always to provide you with excellent care. Hearing back from our patients is one way we can continue to improve our services. Please take a few minutes to complete the written survey that you may receive in the mail after your visit with us. Thank you!             Your  Updated Medication List - Protect others around you: Learn how to safely use, store and throw away your medicines at www.disposemymeds.org.          This list is accurate as of 9/18/18 10:16 AM.  Always use your most recent med list.                   Brand Name Dispense Instructions for use Diagnosis    calcium carbonate 500 mg {elemental} 500 MG tablet    OS-FADY    100 tablet    Take 1 tablet by mouth daily.        DULoxetine 30 MG EC capsule    CYMBALTA    270 capsule    Take 3 capsules (90 mg) by mouth daily    Major depressive disorder, recurrent episode, moderate (H)       ketorolac 0.5 % ophthalmic solution    ACULAR    5 mL    Place 1 drop into the right eye 4 times daily Note: it is normal for these eye drops to cause burning    Cataract, cortical, right eye       losartan 50 MG tablet    COZAAR    90 tablet    Take 1 tablet (50 mg) by mouth daily    Hypertension goal BP (blood pressure) < 140/90       Multi-vitamin Tabs tablet   Generic drug:  multivitamin, therapeutic with minerals     100    as directed    Routine general medical examination at a health care facility       ofloxacin 0.3 % ophthalmic solution    OCUFLOX    1 Bottle    Place 1 drop into the right eye 4 times daily    Cataract, cortical, right eye       penciclovir 1 % cream    DENAVIR    5 g    Apply topically every 2 hours (while awake)    Cold sore       * PRED FORTE 1 % ophthalmic susp   Generic drug:  prednisoLONE acetate     5 mL    Place 1 drop into the right eye 4 times daily Shake well before using.    Cataract, cortical, right eye       * prednisoLONE acetate 1 % ophthalmic susp    PRED FORTE    1 Bottle    Place 1-2 drops into the right eye 3 times daily    Pseudophakia       valACYclovir 1000 mg tablet    VALTREX    20 tablet    Take 2 tablets (2,000 mg) by mouth 2 times daily for 1 day    Cold sore       verapamil 240 MG CR tablet    CALAN-SR    90 tablet    Take 1 tablet (240 mg) by mouth daily    Hypertension goal BP (blood  pressure) < 140/90, Paroxysmal supraventricular tachycardia (H)       * Notice:  This list has 2 medication(s) that are the same as other medications prescribed for you. Read the directions carefully, and ask your doctor or other care provider to review them with you.

## 2018-09-18 NOTE — PROGRESS NOTES
A/P:    Kerri Timmons is a 58 year old female presenting today for 1 month follow up after cataract extraction with intraocular lens implantation of the right eye.     - Doing well  - finishing drops  - Retinal detachment precautions given    Moderate posterior capsular opacity (PCO) - visually significant    Tiny retained lens frag inferior angle - no inflammation - observe but continue pred three times a day, recheck in 1 month    Plan yag right eye in 2 months      ~~~~~~~~~~~~~~~~~~~~~~~~~~~~~~~~~~~~~~~~~~~~~~~~~~~~~~~~~~~~~~~~    Complete documentation of historical and exam elements from today's encounter can be found in the full encounter summary report (not reduplicated in this progress note). I personally obtained the chief complaint(s) and history of present illness.  I confirmed and edited as necessary the review of systems, past medical/surgical history, family history, social history, and examination findings as documented by others.  I examined the patient myself, and I personally reviewed the relevant tests, images, and reports as documented above. I formulated and edited as necessary the assessment and plan and discussed the findings and management plan with the patient and family.     Johny Daniels MD, MA  Director, Cornea & Anterior Segment  [facility] Department of Ophthalmology & Visual Neuroscience

## 2018-09-18 NOTE — PATIENT INSTRUCTIONS
Continue prednisolone drops three times a day until next visit      Restart ofloxacin drops four times a day for 4 days then stop      Return to clinic in 1 month for recheck

## 2018-09-18 NOTE — NURSING NOTE
Chief Complaints and History of Present Illnesses   Patient presents with     Post Op (Ophthalmology) Right Eye      s/p cataract extraction with intraocular lens placement right eye.     HPI    Affected eye(s):  Right   Symptoms:        Duration:  1 month   Frequency:  Constant       Do you have eye pain now?:  No      Comments:  Pt. States that she is doing well.  No c/o comfort BE.  VA is good RE but still seems off slightly.  Sarah Haas COT 9:06 AM September 18, 2018

## 2018-10-19 ENCOUNTER — OFFICE VISIT (OUTPATIENT)
Dept: OPHTHALMOLOGY | Facility: CLINIC | Age: 58
End: 2018-10-19
Attending: OPHTHALMOLOGY
Payer: COMMERCIAL

## 2018-10-19 DIAGNOSIS — Z96.1 PSEUDOPHAKIA: ICD-10-CM

## 2018-10-19 PROCEDURE — G0463 HOSPITAL OUTPT CLINIC VISIT: HCPCS | Mod: ZF

## 2018-10-19 RX ORDER — PREDNISOLONE ACETATE 10 MG/ML
1 SUSPENSION/ DROPS OPHTHALMIC 2 TIMES DAILY
Qty: 1 BOTTLE | Refills: 1 | Status: SHIPPED | OUTPATIENT
Start: 2018-10-19 | End: 2018-11-30

## 2018-10-19 ASSESSMENT — EXTERNAL EXAM - RIGHT EYE: OD_EXAM: NORMAL

## 2018-10-19 ASSESSMENT — EXTERNAL EXAM - LEFT EYE: OS_EXAM: NORMAL

## 2018-10-19 ASSESSMENT — VISUAL ACUITY
OS_SC: 20/25
OD_PH_SC: 20/25-2
METHOD: SNELLEN - LINEAR
OD_SC+: -2
OD_SC: 20/30

## 2018-10-19 ASSESSMENT — REFRACTION_WEARINGRX
OS_CYLINDER: SPHERE
OD_SPHERE: +2.50
OD_CYLINDER: SPHERE
OS_SPHERE: +2.50
SPECS_TYPE: OTC READERS

## 2018-10-19 ASSESSMENT — CONF VISUAL FIELD
OD_NORMAL: 1
METHOD: COUNTING FINGERS
OS_NORMAL: 1

## 2018-10-19 ASSESSMENT — SLIT LAMP EXAM - LIDS
COMMENTS: NORMAL
COMMENTS: NORMAL

## 2018-10-19 ASSESSMENT — TONOMETRY
OD_IOP_MMHG: 18
IOP_METHOD: ICARE
OS_IOP_MMHG: 11

## 2018-10-19 NOTE — PROGRESS NOTES
A/P:     Kerri Timmons is a 58 year old female presenting today for 1 month follow up after cataract extraction with intraocular lens implantation of the right eye.      - Doing well  - finishing drops  - Retinal detachment precautions given     Moderate posterior capsular opacity (PCO) - visually significant     Tiny retained lens frag inferior angle - no inflammation - observe but continue pred three times a day, recheck in 1 month     Plan yag right eye in 2 months         Mariela Funk MD  PGY-5, Cornea Fellow  Ophthalmology

## 2018-10-19 NOTE — PROGRESS NOTES
CC: POM2 s/p CE/IOL right eye    HPI: 59 y/o F with hx of CE/IOL right eye 8/22/18. At month 1 visit, found to have retained lens fragment in AC but minimal inflammation. Kept pred at TID right eye since that time.     POH:  -s/p CE/IOL right eye 8/22/18 with retained lens fragment     Gtts:  -pred TID right eye    A/P:  1. S/p CE/IOL right eye 8/22/18   -with retained lens fragment at month 1   - tiny lens frag continues to shrink without any surrounding reaction   -no AC reaction, IOP wnl   -ok to start pred taper - BID right eye x 2 weeks, then once daily right eye x 2 weeks, then stop    2. Posterior capsular opacity, right eye   -plan for YAG right eye in 1 more month (90 days)    F/u 6 weeks - dilate right eye and plan for YAG OD    Mariela Funk MD  PGY-5, Cornea Fellow  Ophthalmology      ~~~~~~~~~~~~~~~~~~~~~~~~~~~~~~~~~~~~~~~~~~~~~~~~~~~~~~~~~~~~~~~~    Complete documentation of historical and exam elements from today's encounter can be found in the full encounter summary report (not reduplicated in this progress note). I personally obtained the chief complaint(s) and history of present illness.  I confirmed and edited as necessary the review of systems, past medical/surgical history, family history, social history, and examination findings as documented by others.  I examined the patient myself, and I personally reviewed the relevant tests, images, and reports as documented above. I formulated and edited as necessary the assessment and plan and discussed the findings and management plan with the patient and family.     Johny Daniels MD, MA  Director, Cornea & Anterior Segment  [facility] Department of Ophthalmology & Visual Neuroscience

## 2018-10-19 NOTE — PATIENT INSTRUCTIONS
Drop instructions:      Reduce prednisolone to twice a day for 2 weeks, then daily for 2 weeks, then stop      Return to clinic in 6 weeks for YAG Capsulotomy laser treatment

## 2018-10-19 NOTE — NURSING NOTE
"Chief Complaints and History of Present Illnesses   Patient presents with     Follow Up For     1 month follow up Tiny retained lens frag inferior angle RE     HPI    Affected eye(s):  Right   Symptoms:     No floaters   No flashes   No redness   No Dryness   No itching   No burning         Do you have eye pain now?:  No      Comments:  Pt states that vision is fine in RE, but \"eye doesn't feel right.\" Pt states that she is seeing more \"halos\" around lights, especially at night.    Sherri KAUR October 19, 2018 7:34 AM               "

## 2018-10-19 NOTE — MR AVS SNAPSHOT
After Visit Summary   10/19/2018    Kerri Timmons    MRN: [id number]           Patient Information     Date Of Birth          1960        Visit Information        Provider Department      10/19/2018 7:30 AM Johny Daniels MD [facility]        Today's Diagnoses     Pseudophakia          Care Instructions      Drop instructions:      Reduce prednisolone to twice a day for 2 weeks, then daily for 2 weeks, then stop      Return to clinic in 6 weeks for YAG Capsulotomy laser treatment          Follow-ups after your visit        Follow-up notes from your care team     Return in about 6 weeks (around 11/30/2018) for YAG capsulotomy OD.      Your next 10 appointments already scheduled     Nov 30, 2018  7:45 AM CST   RETURN CORNEA with Johny Daniels MD   Eye Clinic ([facility])    [address]  [address] [address]  [facility] [address]   [phone]            Dec 06, 2018 10:30 AM CST   MA DIAGNOSTIC DIGITAL RIGHT with [email or web address]   [facility] ([facility])    [address], [address]nd Floor  [facility] [id number] [phone]           How do I prepare for my exam? (Food and drink instructions) No Food and Drink Restrictions.  How do I prepare for my exam? (Other instructions) Do not use any powder, lotion or deodorant under your arms or on your breast. If you do, we will ask you to remove it before your exam.  What should I wear: Wear comfortable, two-piece clothing.  How long does the exam take: Most scans will take 15 minutes.  What should I bring: Bring any previous mammograms from other facilities or have them mailed to the breast center.  Do I need a :  No  is needed.  What do I need to tell my doctor: If you have any allergies, tell your care team.  What should I do after the exam: No restrictions, You may resume normal activities.  What is this test: This test is an x-ray of the breast  "to look for breast disease. The breast is pressed between two plates to flatten and spread the tissue. An X-ray is taken of the breast from different angles.  Who should I call with questions: If you have any questions, please call the Imaging Department where you will have your exam. Directions, parking instructions, and other information is available on our website, [email or web address].  Other information about my exam Three-dimensional (3D) mammograms are available at [place] locations in [facility], [place], and [place]. [facility] locations include [place] and [facility] [place] in [place].  Benefits of 3D mammograms include: * Improved rate of cancer detection * Decreases your chance of having to go back for more tests, which means fewer: * \"False-positive\" results (This means that there is an abnormal area but it isn't cancer.) * Invasive testing procedures, such as a biopsy or surgery * Can provide clearer images of the breast if you have dense breast tissue.  *3D mammography is an optional exam that anyone can have with a 2D mammogram. It doesn't replace or take the place of a 2D mammogram. 2D mammograms remain an effective screening test for all women.  Not all insurance companies cover the cost of a 3D mammogram. Check with your insurance.            Dec 06, 2018 11:00 AM CST   US BREAST RIGHT LIMITED 1-3 QUAD with [email or web address]   [facility] ([facility])    [address] [place] [address], 2nd Floor  [facility] [address] [phone]           How do I prepare for my exam? (Food and drink instructions) No Food and Drink Restrictions.  How do I prepare for my exam? (Other instructions) You do not need to do anything special to prepare for your exam.  What should I wear: Wear comfortable clothes.  How long does the exam take: Most ultrasounds take 30 to 60 minutes.  What should I bring: Bring a list of your " medicines, including vitamins, minerals and over-the-counter drugs. It is safest to leave personal items at home.  Do I need a :  No  is needed.  What do I need to tell my doctor: Tell your doctor about any allergies you may have.  What should I do after the exam: No restrictions, You may resume normal activities.  What is this test: An ultrasound uses sound waves to make pictures of the body. Sound waves do not cause pain. The only discomfort may be the pressure of the wand against your skin or full bladder.  Who should I call with questions: If you have any questions, please call the Imaging Department where you will have your exam. Directions, parking instructions, and other information is available on our website, [email or web address].              Who to contact     Please call your clinic at [phone] to:    Ask questions about your health    Make or cancel appointments    Discuss your medicines    Learn about your test results    Speak to your doctor            Additional Information About Your Visit        [employer] Information     [employer] gives you secure access to your electronic health record. If you see a primary care provider, you can also send messages to your care team and make appointments. If you have questions, please call your primary care clinic.  If you do not have a primary care provider, please call [phone] and they will assist you.      [employer] is an electronic gateway that provides easy, online access to your medical records. With [employer], you can request a clinic appointment, read your test results, renew a prescription or communicate with your care team.     To access your existing account, please contact your [facility] or call [phone] for assistance.        Care EveryWhere ID     This is your Care EveryWhere ID. This could be used by other organizations to access your [facility] medical records  [id number]         Blood Pressure from  Last 3 Encounters:   08/22/18 126/84   08/16/18 127/82   06/14/18 142/88    Weight from Last 3 Encounters:   08/22/18 65.8 kg (145 lb)   08/16/18 64.9 kg (143 lb)   06/14/18 64.9 kg (143 lb)              Today, you had the following     No orders found for display         Today's Medication Changes          These changes are accurate as of 10/19/18  8:43 AM.  If you have any questions, ask your nurse or doctor.               These medicines have changed or have updated prescriptions.        Dose/Directions    prednisoLONE acetate 1 % ophthalmic susp   Commonly known as:  PRED FORTE   This may have changed:    - how much to take  - when to take this   Used for:  Pseudophakia   Changed by:  Johny Daniels MD        Dose:  1 drop   Place 1 drop into the right eye 2 times daily   Quantity:  1 Bottle   Refills:  1            Where to get your medicines      These medications were sent to [facility]/pharmacy #[id number] - [place], [place] - [address], [place] [address]     Phone:  [phone]     prednisoLONE acetate 1 % ophthalmic susp                Primary Care Provider Office Phone # Fax #    Mira DAGO Draper [phone] [address] [facility] [address]        Equal Access to Services     AUBREY SWAN AH: Deepika olivares hadasho Soomaali, waaxda luqadaha, qaybta kaalmada adeegyada, waxefrem daniel. So [facility] [phone].    ATENCIÓN: Si habla español, tiene a davis disposición servicios gratuitos de asistencia lingüística. Llalicia al [phone].    We comply with applicable federal civil rights laws and [place] laws. We do not discriminate on the basis of race, color, national origin, age, disability, sex, sexual orientation, or gender identity.            Thank you!     Thank you for choosing [facility]  for your care. Our goal is always to provide you with excellent care. Hearing back from our patients is one way we can continue to  improve our services. Please take a few minutes to complete the written survey that you may receive in the mail after your visit with us. Thank you!             Your Updated Medication List - Protect others around you: Learn how to safely use, store and throw away your medicines at www.disposemymeds.org.          This list is accurate as of 10/19/18  8:43 AM.  Always use your most recent med list.                   Brand Name Dispense Instructions for use Diagnosis    calcium carbonate 500 mg (elemental) 500 MG tablet    OS-FADY    100 tablet    Take 1 tablet by mouth daily.        DULoxetine 30 MG EC capsule    CYMBALTA    270 capsule    Take 3 capsules (90 mg) by mouth daily    Major depressive disorder, recurrent episode, moderate (H)       losartan 50 MG tablet    COZAAR    90 tablet    Take 1 tablet (50 mg) by mouth daily    Hypertension goal BP (blood pressure) < 140/90       Multi-vitamin Tabs tablet   Generic drug:  multivitamin, therapeutic with minerals     100    as directed    Routine general medical examination at a health care facility       penciclovir 1 % cream    DENAVIR    5 g    Apply topically every 2 hours (while awake)    Cold sore       prednisoLONE acetate 1 % ophthalmic susp    PRED FORTE    1 Bottle    Place 1 drop into the right eye 2 times daily    Pseudophakia       valACYclovir 1000 mg tablet    VALTREX    20 tablet    Take 2 tablets (2,000 mg) by mouth 2 times daily for 1 day    Cold sore       verapamil 240 MG CR tablet    CALAN-SR    90 tablet    Take 1 tablet (240 mg) by mouth daily    Hypertension goal BP (blood pressure) < 140/90, Paroxysmal supraventricular tachycardia (H)

## 2018-11-30 ENCOUNTER — OFFICE VISIT (OUTPATIENT)
Dept: OPHTHALMOLOGY | Facility: CLINIC | Age: 58
End: 2018-11-30
Attending: OPHTHALMOLOGY
Payer: COMMERCIAL

## 2018-11-30 DIAGNOSIS — Z96.1 PSEUDOPHAKIA: ICD-10-CM

## 2018-11-30 DIAGNOSIS — H26.491 POSTERIOR CAPSULAR OPACIFICATION, RIGHT: Primary | ICD-10-CM

## 2018-11-30 PROCEDURE — 66821 AFTER CATARACT LASER SURGERY: CPT | Mod: ZF | Performed by: OPHTHALMOLOGY

## 2018-11-30 PROCEDURE — G0463 HOSPITAL OUTPT CLINIC VISIT: HCPCS | Mod: 25,ZF

## 2018-11-30 ASSESSMENT — VISUAL ACUITY
OS_PH_SC: 20/25+1
METHOD: SNELLEN - LINEAR
OD_SC: 20/30
OD_PH_SC: 20/25-2
OS_SC: 20/30
OS_SC+: +2

## 2018-11-30 ASSESSMENT — TONOMETRY
OD_IOP_MMHG: 17
IOP_METHOD: TONOPEN
OS_IOP_MMHG: 17

## 2018-11-30 ASSESSMENT — CONF VISUAL FIELD
OD_NORMAL: 1
METHOD: COUNTING FINGERS
OS_NORMAL: 1

## 2018-11-30 ASSESSMENT — REFRACTION_WEARINGRX
OD_SPHERE: +2.50
OS_CYLINDER: SPHERE
SPECS_TYPE: OTC READERS
OS_SPHERE: +2.50
OD_CYLINDER: SPHERE

## 2018-11-30 NOTE — NURSING NOTE
Chief Complaints and History of Present Illnesses   Patient presents with     Follow Up For     6 week follow up S/p CE/IOL right eye 8/22/18, YAG RE today     HPI    Affected eye(s):  Right   Symptoms:     No floaters   No flashes   No redness   No tearing   No itching         Do you have eye pain now?:  No      Comments:  Pt states vision has been good since last visit. No eye pain today. Dryness upon waking, relief with drops.    Sherri KAUR November 30, 2018 7:36 AM

## 2018-12-06 ENCOUNTER — RADIANT APPOINTMENT (OUTPATIENT)
Dept: MAMMOGRAPHY | Facility: CLINIC | Age: 58
End: 2018-12-06
Attending: NURSE PRACTITIONER
Payer: COMMERCIAL

## 2018-12-06 DIAGNOSIS — Z09 FOLLOW-UP EXAM, 3-6 MONTHS SINCE PREVIOUS EXAM: ICD-10-CM

## 2018-12-20 ASSESSMENT — SLIT LAMP EXAM - LIDS
COMMENTS: NORMAL
COMMENTS: NORMAL

## 2018-12-20 ASSESSMENT — EXTERNAL EXAM - RIGHT EYE: OD_EXAM: NORMAL

## 2018-12-20 ASSESSMENT — EXTERNAL EXAM - LEFT EYE: OS_EXAM: NORMAL

## 2018-12-20 NOTE — PROGRESS NOTES
CC: PO32 s/p CE/IOL right eye    HPI: 59 y/o F with hx of CE/IOL right eye 8/22/18. At month 1 visit, found to have retained lens fragment in AC but minimal inflammation. Kept pred at TID right eye since that time.     POH:  -s/p CE/IOL right eye 8/22/18 with tiny retained lens fragment in AC    Gtts:  -pred TID right eye    A/P:  1. S/p CE/IOL right eye 8/22/18   -with retained lens fragment at month 1   - lens frag gone   -no AC reaction, IOP wnl      2. Posterior capsular opacity, right eye   -ok to do yag cap right eye today  See note  Predforte  Four times a day  X 4 days then stop    ~~~~~~~~~~~~~~~~~~~~~~~~~~~~~~~~~~~~~~~~~~~~~~~~~~~~~~~~~~~~~~~~    ~~~~~~~~~~~~~~~~~~~~~~~~~~~~~~~~~~~~~~~~~~~~~~~~~~~~~~~~~~~~~~~~    Complete documentation of historical and exam elements from today's encounter can be found in the full encounter summary report (not reduplicated in this progress note). I personally obtained the chief complaint(s) and history of present illness.  I confirmed and edited as necessary the review of systems, past medical/surgical history, family history, social history, and examination findings as documented by others.  I examined the patient myself, and I personally reviewed the relevant tests, images, and reports as documented above. I formulated and edited as necessary the assessment and plan and discussed the findings and management plan with the patient and family.     I was personally present for the entirety of the in-office procedure.     Johny Daniels MD, MA  Director, Cornea & Anterior Segment  [facility] Department of Ophthalmology & Visual Neuroscience

## 2019-02-12 ENCOUNTER — TELEPHONE (OUTPATIENT)
Dept: FAMILY MEDICINE | Facility: CLINIC | Age: 59
End: 2019-02-12

## 2019-02-12 DIAGNOSIS — R10.12 LUQ ABDOMINAL PAIN: Primary | ICD-10-CM

## 2019-02-12 NOTE — TELEPHONE ENCOUNTER
Phone call from radiology Peter     Patient had called to schedule an appointment and was upset that she did not have an order placed by pcp     Writer placed call to patient and received her voicemail - message left to return call to clinic to speak to triage to get more information as to the order she is requesting - writer reviewed chart and was unable to see any documentation regarding imaging     Deb Marshall, Registered Nurse   [facility]

## 2019-02-13 NOTE — TELEPHONE ENCOUNTER
Another message left for patient to return call to clinic to discuss orders that she was looking for when she tried to schedule with radiology - unclear what order patient is requesting, need to clarify with patient     My chart message sent     Deb Marshall Registered Nurse   [facility]

## 2019-02-13 NOTE — TELEPHONE ENCOUNTER
Writer called patient, reviewed CT ordered by PCP and reviewed radiology scheduling number.    Patient verbalized understanding and plans to request CT scan be scheduled at [facility].    FIDE UriarteN, RN

## 2019-02-13 NOTE — TELEPHONE ENCOUNTER
"Mira Draper, NP     You had ordered a CT scan for patient 15 for left upper abdominal pain and she never completed the test as she was fearful that there may be something wrong     Patient has a physical scheduled with you on 19 and would like to do this CT scan prior to her visit     Patient states she has had left upper abdominal pain for years - it has worsened over the past 6 mos   Denies nausea/vomiting/diarrhea   Pain is a gnawing feeling and it comes and goes   Patient notes that she holds her side when the pain starts as she feels as if \"something will come out\"     Please reorder CT scan If appropriate as last order has    If you prefer to see patient first please advise     Patient is at work and would like detailed message left for her with recommendations     Thank you,   Deb Marshall, Registered Nurse   [facility]     "

## 2019-02-26 ENCOUNTER — HOSPITAL ENCOUNTER (OUTPATIENT)
Dept: CT IMAGING | Facility: CLINIC | Age: 59
Discharge: HOME | End: 2019-02-26
Attending: NURSE PRACTITIONER | Admitting: NURSE PRACTITIONER
Payer: COMMERCIAL

## 2019-02-26 DIAGNOSIS — R10.12 LUQ ABDOMINAL PAIN: ICD-10-CM

## 2019-02-26 PROCEDURE — 74177 CT ABD & PELVIS W/CONTRAST: CPT

## 2019-02-26 PROCEDURE — 25000128 H RX IP 250 OP 636: Performed by: NURSE PRACTITIONER

## 2019-02-26 RX ORDER — IOPAMIDOL 755 MG/ML
500 INJECTION, SOLUTION INTRAVASCULAR ONCE
Status: COMPLETED | OUTPATIENT
Start: 2019-02-26 | End: 2019-02-26

## 2019-02-26 RX ADMIN — IOPAMIDOL 73 ML: 755 INJECTION, SOLUTION INTRAVENOUS at 11:36

## 2019-02-26 RX ADMIN — SODIUM CHLORIDE 48 ML: 9 INJECTION, SOLUTION INTRAVENOUS at 11:36

## 2019-03-15 ENCOUNTER — MYC MEDICAL ADVICE (OUTPATIENT)
Dept: FAMILY MEDICINE | Facility: CLINIC | Age: 59
End: 2019-03-15

## 2019-03-15 DIAGNOSIS — L98.9 SKIN LESION: Primary | ICD-10-CM

## 2019-04-10 ENCOUNTER — OFFICE VISIT (OUTPATIENT)
Dept: DERMATOLOGY | Facility: CLINIC | Age: 59
End: 2019-04-10
Payer: COMMERCIAL

## 2019-04-10 VITALS — HEART RATE: 91 BPM | OXYGEN SATURATION: 95 % | SYSTOLIC BLOOD PRESSURE: 143 MMHG | DIASTOLIC BLOOD PRESSURE: 93 MMHG

## 2019-04-10 DIAGNOSIS — L81.4 LENTIGINES: ICD-10-CM

## 2019-04-10 DIAGNOSIS — D22.9 MULTIPLE BENIGN NEVI: ICD-10-CM

## 2019-04-10 DIAGNOSIS — L30.9 DERMATITIS: Primary | ICD-10-CM

## 2019-04-10 DIAGNOSIS — L29.9 LOCALIZED PRURITUS: ICD-10-CM

## 2019-04-10 DIAGNOSIS — L81.0 POST-INFLAMMATORY HYPERPIGMENTATION: ICD-10-CM

## 2019-04-10 PROCEDURE — 99214 OFFICE O/P EST MOD 30 MIN: CPT | Performed by: PHYSICIAN ASSISTANT

## 2019-04-10 RX ORDER — FLUOCINONIDE 0.5 MG/G
CREAM TOPICAL
Qty: 15 G | Refills: 0 | Status: SHIPPED | OUTPATIENT
Start: 2019-04-10 | End: 2021-01-18

## 2019-04-10 NOTE — PATIENT INSTRUCTIONS
Apply a thin layer of lidex to affected area 2x a day for 2 weeks. Tapering with improvement. Do not use on face or body folds.  Discussed side effects of topical steroids including but not limited to atrophy (skin thinning), striae (stretch marks) telangiectasias, steroid acne, and others. Do not apply to normal skin. Do not apply to discolored skin that does not have rash present. Educated patient on post inflammatory hyperpigmentation.       Proper skin care from [employer]:    -Eliminate harsh soaps as they strip the natural oils from the skin, often resulting in dry itchy skin ( i.e. Dial, Zest, Carlota Spring)  -Use mild soaps such as Cetaphil or Dove Sensitive Skin in the shower. You do not need to use soap on arms, legs, and trunk every time you shower unless visibly soiled.   -Avoid hot or cold showers.  -After showering, lightly dry off and apply moisturizing within 2-3 minutes. This will help trap moisture in the skin.   -Aggressive use of a moisturizer at least 1-2 times a day to the entire body (including -Vanicream, Cetaphil, Aquaphor or Cerave) and moisturize hands after every washing.  -We recommend using moisturizers that come in a tub that needs to be scooped out, not a pump. This has more of an oil base. It will hold moisture in your skin much better than a water base moisturizer. The above recommended are non-pore clogging.           Wear a sunscreen with at least SPF 30 on your face, ears, neck and V of the chest daily. Wear sunscreen on other areas of the body if those areas are exposed to the sun throughout the day. Sunscreens can contain physical and/or chemical blockers. Physical blockers are less likely to clog pores, these include zinc oxide and titanium dioxide. Reapply every two hour and after swimming. Sunscreen examples include Neutrogena, CeraVe, Blue Lizard, Elta MD and many others.    UV radiation  UVA radiation remains constant throughout the day and throughout the year. It  is a longer wavelength than UVB and therefore penetrates deeper into the skin leading to immediate and delayed tanning, photoaging, and skin cancer. 70-80% of UVA and UVB radiation occurs between the hours of 10am-2pm.  UVB radiation  UVB radiation causes the most harmful effects and is more significant during the summer months. However, snow and ice can reflect UVB radiation leading to skin damage during the winter months as well. UVB radiation is responsible for tanning, burning, inflammation, delayed erythema (pinkness), pigmentation (brown spots), and skin cancer.

## 2019-04-10 NOTE — PROGRESS NOTES
HPI:  Kerri Timmons is a 58 year old female patient here today for spots on back .  Patient states this has been present for a few months. Has flared the two mccullough on upper back.  Patient reports the following symptoms: itchy .  Patient reports the following previous treatments: none. Pt washes body with dial soap. Moisturizes with jose eduardo..  Patient reports the following modifying factors: none.  Associated symptoms: none.  Patient has no other skin complaints today.  Remainder of the HPI, Meds, PMH, Allergies, FH, and SH was reviewed in chart.    Pertinent Hx:   No personal or family history of skin cancer. Possible that mother had skin cancer.     Past Medical History:   Diagnosis Date     ASCUS on Pap smear 11/30/2010    colp wnl     Cataract, right eye 06/17/2017     Depressive disorder      Diabetes (H)     h/o gestational     Hypertension      SVT (supraventricular tachycardia) (H) 2008       Past Surgical History:   Procedure Laterality Date     APPENDECTOMY       CATARACT IOL, RT/LT Right 08/24/2018     CHOLECYSTECTOMY       PHACOEMULSIFICATION WITH STANDARD INTRAOCULAR LENS IMPLANT Right 8/22/2018    Procedure: PHACOEMULSIFICATION WITH STANDARD INTRAOCULAR LENS IMPLANT;  Right Eye Phacoemulsification with Standard Intraocular Lens;  Surgeon: Johny Daniels MD;  Location: [facility] OR        Family History   Problem Relation Age of Onset     C.A.D. Father         early 60's     Hypertension Father      Alzheimer Disease Brother      Hypertension Sister      Glaucoma No family hx of      Macular Degeneration No family hx of      Retinal detachment No family hx of      Amblyopia No family hx of        Social History     Socioeconomic History     Marital status:      Spouse name: Not on file     Number of children: 2     Years of education: Not on file     Highest education level: Not on file   Occupational History     Employer: [employer]   Social Needs     Financial resource  strain: Not on file     Food insecurity:     Worry: Not on file     Inability: Not on file     Transportation needs:     Medical: Not on file     Non-medical: Not on file   Tobacco Use     Smoking status: Light Tobacco Smoker     Types: Cigarettes     Smokeless tobacco: Never Used     Tobacco comment: 6 per week    Substance and Sexual Activity     Alcohol use: Yes     Comment: a couple drinks 3 times per week      Drug use: No     Sexual activity: Yes     Partners: Male     Birth control/protection: None   Lifestyle     Physical activity:     Days per week: Not on file     Minutes per session: Not on file     Stress: Not on file   Relationships     Social connections:     Talks on phone: Not on file     Gets together: Not on file     Attends Anglican service: Not on file     Active member of club or organization: Not on file     Attends meetings of clubs or organizations: Not on file     Relationship status: Not on file     Intimate partner violence:     Fear of current or ex partner: Not on file     Emotionally abused: Not on file     Physically abused: Not on file     Forced sexual activity: Not on file   Other Topics Concern     Parent/sibling w/ CABG, MI or angioplasty before 65F 55M? No   Social History Narrative    Caffeine intake/servings daily - 1    Calcium intake/servings daily - sup    Exercise 7 times weekly - describe walk    Sunscreen used - Yes    Seatbelts used - Yes    Guns stored in the home - No    Self Breast Exam - Yes and No    Pap test up to date -  Yes    Eye exam up to date -  Yes    Dental exam up to date -  Yes    DEXA scan up to date -  No    Flex Sig/Colonoscopy up to date -  Yes, 2010    Mammography up to date -  Yes    Immunizations reviewed and up to date - Yes    Abuse: Current or Past (Physical, Sexual or Emotional) - No    Do you feel safe in your environment - Yes    Do you cope well with stress - Yes    Do you suffer from insomnia - No    Last updated by: Dee Fitzpatrick Ma  1/20/2011                    Outpatient Encounter Medications as of 4/10/2019   Medication Sig Dispense Refill     calcium carbonate 500 MG tablet Take 1 tablet by mouth daily. 100 tablet 3     DULoxetine (CYMBALTA) 30 MG EC capsule Take 3 capsules (90 mg) by mouth daily 270 capsule 3     fluocinonide (LIDEX) 0.05 % external cream Apply a thin layer to affected area BID x 2 weeks, tapering with improvement. Do not apply to face or body folds. 15 g 0     losartan (COZAAR) 50 MG tablet Take 1 tablet (50 mg) by mouth daily 90 tablet PRN     MULTI-VITAMIN OR TABS as directed 100 0     penciclovir (DENAVIR) 1 % cream Apply topically every 2 hours (while awake) 5 g 3     verapamil (CALAN-SR) 240 MG CR tablet Take 1 tablet (240 mg) by mouth daily 90 tablet PRN     valACYclovir (VALTREX) 1000 mg tablet Take 2 tablets (2,000 mg) by mouth 2 times daily for 1 day 20 tablet PRN     No facility-administered encounter medications on file as of 4/10/2019.        Review Of Systems:  Skin: As above  Eyes: negative  Ears/Nose/Throat: negative  Respiratory: No shortness of breath, dyspnea on exertion, cough, or hemoptysis  Cardiovascular: negative  Gastrointestinal: negative  Genitourinary: negative  Musculoskeletal: negative  Neurologic: negative  Psychiatric: negative  Hematologic/Lymphatic/Immunologic: negative  Endocrine: negative      Objective:     BP (!) 143/93   Pulse 91   SpO2 95%   Eyes: Conjunctivae/lids: Normal   ENT: Lips:  Normal  MSK: Normal  Cardiovascular: Peripheral edema none  Pulm: Breathing Normal  Neuro/Psych: Orientation: Normal; Mood/Affect: Normal, NAD, WDWN  Pt accompanied by: self  Following areas examined: Scalp, face, eyelids, lips, neck, chest, abdomen, back, buttocks, and R&L upper and lower extremities.  Soler skin type:i   Findings:  Hypopigmented smooth macules on back  Well circumscribed macules with symmetric color distribution on trunk and extremities.  Tan WD smooth macules on face, neck, trunk, and  extremities.  pink scaly slightly raised plaque on right upper back    Assessment and Plan:     1) IGH vs post inflammatory hypopigmentation, Benign nevi, Lentigines     I discussed the specifics of tumor, prognosis, and genetics of benign lesions.  I explained that treatment of these lesions would be purely cosmetic and not medically neccessary.  I discussed with patient different removal options including excision, cryotherapy, cautery and /or laser.  Lesion may recur and/or may not completely resolve. May need additional treatment.     2) dermatitis and localized pruritis  Apply a thin layer of lidex to affected area 2x a day for 2 weeks. Tapering with improvement. Do not use on face or body folds.  Discussed side effects of topical steroids including but not limited to atrophy (skin thinning), striae (stretch marks) telangiectasias, steroid acne, and others. Do not apply to normal skin. Do not apply to discolored skin that does not have rash present. Educated patient on post inflammatory hyperpigmentation.   Signs and Symptoms of non-melanoma skin cancer and ABCDEs of melanoma reviewed with patient. Patient encouraged to perform monthly self skin exams and educated on how to perform them. UV precautions reviewed with patient. Patient was asked about new or changing moles/lesions on body.   Wear a sunscreen with at least SPF 30 on your face, ears, neck and V of the chest daily. Wear sunscreen on other areas of the body if those areas are exposed to the sun throughout the day. Sunscreens can contain physical and/or chemical blockers. Physical blockers are less likely to clog pores, these include zinc oxide and titanium dioxide. Reapply every two hour and after swimming. Sunscreen examples include Neutrogena CeraVe, Blue Lizard, Elta MD and many others.    Proper skin care from [facility]:    -Eliminate harsh soaps as they strip the natural oils from the skin, often resulting in dry itchy skin ( i.e.  Dial, Zest, Carlota Spring)  -Use mild soaps such as Cetaphil or Dove Sensitive Skin in the shower. You do not need to use soap on arms, legs, and trunk every time you shower unless visibly soiled.   -Avoid hot or cold showers.  -After showering, lightly dry off and apply moisturizing within 2-3 minutes. This will help trap moisture in the skin.   -Aggressive use of a moisturizer at least 1-2 times a day to the entire body (including -Vanicream, Cetaphil, Aquaphor or Cerave) and moisturize hands after every washing.  -We recommend using moisturizers that come in a tub that needs to be scooped out, not a pump. This has more of an oil base. It will hold moisture in your skin much better than a water base moisturizer. The above recommended are non-pore clogging.    Kerri to follow up with Primary Care provider regarding elevated blood pressure.      Follow up in 2 weeks to recheck dermatitis

## 2019-04-10 NOTE — LETTER
4/10/2019         RE: Kerri Timmons  [address]  [place] [address]        Dear Colleague,    Thank you for referring your patient, Kerri Timmons, to the [facility]. Please see a copy of my visit note below.    HPI:  Kerri Timmons is a 58 year old female patient here today for spots on back .  Patient states this has been present for a few months. Has flared the two mccullough on upper back.  Patient reports the following symptoms: itchy .  Patient reports the following previous treatments: none. Pt washes body with dial soap. Moisturizes with niviea..  Patient reports the following modifying factors: none.  Associated symptoms: none.  Patient has no other skin complaints today.  Remainder of the HPI, Meds, PMH, Allergies, FH, and SH was reviewed in chart.    Pertinent Hx:   No personal or family history of skin cancer. Possible that mother had skin cancer.     Past Medical History:   Diagnosis Date     ASCUS on Pap smear 11/30/2010    colp wnl     Cataract, right eye 06/17/2017     Depressive disorder      Diabetes (H)     h/o gestational     Hypertension      SVT (supraventricular tachycardia) (H) 2008       Past Surgical History:   Procedure Laterality Date     APPENDECTOMY       CATARACT IOL, RT/LT Right 08/24/2018     CHOLECYSTECTOMY       PHACOEMULSIFICATION WITH STANDARD INTRAOCULAR LENS IMPLANT Right 8/22/2018    Procedure: PHACOEMULSIFICATION WITH STANDARD INTRAOCULAR LENS IMPLANT;  Right Eye Phacoemulsification with Standard Intraocular Lens;  Surgeon: Johny Daniels MD;  Location:  OR        Family History   Problem Relation Age of Onset     C.A.D. Father         early 60's     Hypertension Father      Alzheimer Disease Brother      Hypertension Sister      Glaucoma No family hx of      Macular Degeneration No family hx of      Retinal detachment No family hx of      Amblyopia No family hx of        Social History     Socioeconomic History     Marital status:       Spouse name: Not on file     Number of children: 2     Years of education: Not on file     Highest education level: Not on file   Occupational History     Employer: [employer]   Social Needs     Financial resource strain: Not on file     Food insecurity:     Worry: Not on file     Inability: Not on file     Transportation needs:     Medical: Not on file     Non-medical: Not on file   Tobacco Use     Smoking status: Light Tobacco Smoker     Types: Cigarettes     Smokeless tobacco: Never Used     Tobacco comment: 6 per week    Substance and Sexual Activity     Alcohol use: Yes     Comment: a couple drinks 3 times per week      Drug use: No     Sexual activity: Yes     Partners: Male     Birth control/protection: None   Lifestyle     Physical activity:     Days per week: Not on file     Minutes per session: Not on file     Stress: Not on file   Relationships     Social connections:     Talks on phone: Not on file     Gets together: Not on file     Attends Scientologist service: Not on file     Active member of club or organization: Not on file     Attends meetings of clubs or organizations: Not on file     Relationship status: Not on file     Intimate partner violence:     Fear of current or ex partner: Not on file     Emotionally abused: Not on file     Physically abused: Not on file     Forced sexual activity: Not on file   Other Topics Concern     Parent/sibling w/ CABG, MI or angioplasty before 65F 55M? No   Social History Narrative    Caffeine intake/servings daily - 1    Calcium intake/servings daily - sup    Exercise 7 times weekly - describe walk    Sunscreen used - Yes    Seatbelts used - Yes    Guns stored in the home - No    Self Breast Exam - Yes and No    Pap test up to date -  Yes    Eye exam up to date -  Yes    Dental exam up to date -  Yes    DEXA scan up to date -  No    Flex Sig/Colonoscopy up to date -  Yes, 2010    Mammography up to date -  Yes    Immunizations  reviewed and up to date - Yes    Abuse: Current or Past (Physical, Sexual or Emotional) - No    Do you feel safe in your environment - Yes    Do you cope well with stress - Yes    Do you suffer from insomnia - No    Last updated by: Dee Fitzpatrick Ma  1/20/2011                   Outpatient Encounter Medications as of 4/10/2019   Medication Sig Dispense Refill     calcium carbonate 500 MG tablet Take 1 tablet by mouth daily. 100 tablet 3     DULoxetine (CYMBALTA) 30 MG EC capsule Take 3 capsules (90 mg) by mouth daily 270 capsule 3     fluocinonide (LIDEX) 0.05 % external cream Apply a thin layer to affected area BID x 2 weeks, tapering with improvement. Do not apply to face or body folds. 15 g 0     losartan (COZAAR) 50 MG tablet Take 1 tablet (50 mg) by mouth daily 90 tablet PRN     MULTI-VITAMIN OR TABS as directed 100 0     penciclovir (DENAVIR) 1 % cream Apply topically every 2 hours (while awake) 5 g 3     verapamil (CALAN-SR) 240 MG CR tablet Take 1 tablet (240 mg) by mouth daily 90 tablet PRN     valACYclovir (VALTREX) 1000 mg tablet Take 2 tablets (2,000 mg) by mouth 2 times daily for 1 day 20 tablet PRN     No facility-administered encounter medications on file as of 4/10/2019.        Review Of Systems:  Skin: As above  Eyes: negative  Ears/Nose/Throat: negative  Respiratory: No shortness of breath, dyspnea on exertion, cough, or hemoptysis  Cardiovascular: negative  Gastrointestinal: negative  Genitourinary: negative  Musculoskeletal: negative  Neurologic: negative  Psychiatric: negative  Hematologic/Lymphatic/Immunologic: negative  Endocrine: negative      Objective:     BP (!) 143/93   Pulse 91   SpO2 95%   Eyes: Conjunctivae/lids: Normal   ENT: Lips:  Normal  MSK: Normal  Cardiovascular: Peripheral edema none  Pulm: Breathing Normal  Neuro/Psych: Orientation: Normal; Mood/Affect: Normal, NAD, WDWN  Pt accompanied by: self  Following areas examined: .jfrul    Soler skin type:i    Findings:  Hypopigmented smooth macules on back  Well circumscribed macules with symmetric color distribution on trunk and extremities.  Tan WD smooth macules on face, neck, trunk, and extremities.  pink scaly slightly raised plaque on right upper back    Assessment and Plan:     1) IGH vs post inflammatory hypopigmentation, Benign nevi, Lentigines     I discussed the specifics of tumor, prognosis, and genetics of benign lesions.  I explained that treatment of these lesions would be purely cosmetic and not medically neccessary.  I discussed with patient different removal options including excision, cryotherapy, cautery and /or laser.  Lesion may recur and/or may not completely resolve. May need additional treatment.     2) dermatitis and localized pruritis  Apply a thin layer of lidex to affected area 2x a day for 2 weeks. Tapering with improvement. Do not use on face or body folds.  Discussed side effects of topical steroids including but not limited to atrophy (skin thinning), striae (stretch marks) telangiectasias, steroid acne, and others. Do not apply to normal skin. Do not apply to discolored skin that does not have rash present. Educated patient on post inflammatory hyperpigmentation.   Signs and Symptoms of non-melanoma skin cancer and ABCDEs of melanoma reviewed with patient. Patient encouraged to perform monthly self skin exams and educated on how to perform them. UV precautions reviewed with patient. Patient was asked about new or changing moles/lesions on body.   Wear a sunscreen with at least SPF 30 on your face, ears, neck and V of the chest daily. Wear sunscreen on other areas of the body if those areas are exposed to the sun throughout the day. Sunscreens can contain physical and/or chemical blockers. Physical blockers are less likely to clog pores, these include zinc oxide and titanium dioxide. Reapply every two hour and after swimming. Sunscreen examples include Neutrogena, CeraVe, Blue Lizard,  Elta MD and many others.    Proper skin care from [facility]:    -Eliminate harsh soaps as they strip the natural oils from the skin, often resulting in dry itchy skin ( i.e. Dial, Zest, Bulgarian Spring)  -Use mild soaps such as Cetaphil or Dove Sensitive Skin in the shower. You do not need to use soap on arms, legs, and trunk every time you shower unless visibly soiled.   -Avoid hot or cold showers.  -After showering, lightly dry off and apply moisturizing within 2-3 minutes. This will help trap moisture in the skin.   -Aggressive use of a moisturizer at least 1-2 times a day to the entire body (including -Vanicream, Cetaphil, Aquaphor or Cerave) and moisturize hands after every washing.  -We recommend using moisturizers that come in a tub that needs to be scooped out, not a pump. This has more of an oil base. It will hold moisture in your skin much better than a water base moisturizer. The above recommended are non-pore clogging.    Kerri to follow up with Primary Care provider regarding elevated blood pressure.      Follow up in 2 weeks to recheck dermatitis      Again, thank you for allowing me to participate in the care of your patient.        Sincerely,        Mira Adkins PA-C

## 2019-04-26 ENCOUNTER — TELEPHONE (OUTPATIENT)
Dept: DERMATOLOGY | Facility: CLINIC | Age: 59
End: 2019-04-26

## 2019-04-26 DIAGNOSIS — B00.1 COLD SORE: ICD-10-CM

## 2019-04-26 RX ORDER — PENCICLOVIR 10 MG/G
CREAM TOPICAL
Qty: 5 G | Refills: 1 | Status: SHIPPED | OUTPATIENT
Start: 2019-04-26 | End: 2019-12-14

## 2019-04-26 NOTE — TELEPHONE ENCOUNTER
Called patient- she states she has not seen Maris for this.  Advised that Maris cannot fill if she hasn't seen her for it. I can refill under her last providers name because it has been within a year and it is under [facility] refill protocol, But she will need to either see Maris or the other provider for the next refill  patient verbalized understanding    Thank you,    Maricarmen GRAYSON RN BSN  [facility]  [phone]  [facility]  [phone]

## 2019-04-26 NOTE — TELEPHONE ENCOUNTER
Reason for Call:  Other prescription    Detailed comments: Patient cancelled her 5/1 appt with Mira because she's going out of town.     Wondering if Mira can write a prescription for Denavir for her cold sores. She leaves for [place] tomorrow and is asking for this prescription for today. Please give patient a call.     She also does not think she needs a follow up with Mira anymore.    Phone Number Patient can be reached at: Cell number on file:    Telephone Information:   Mobile [phone]       Best Time: Anytime today    Can we leave a detailed message on this number? YES    Call taken on 4/26/2019 at 9:37 AM by Kateryna Powell

## 2019-05-11 DIAGNOSIS — F33.1 MAJOR DEPRESSIVE DISORDER, RECURRENT EPISODE, MODERATE (H): ICD-10-CM

## 2019-05-11 NOTE — TELEPHONE ENCOUNTER
"Requested Prescriptions   Pending Prescriptions Disp Refills     DULoxetine (CYMBALTA) 30 MG capsule [Pharmacy Med Name: DULOXETINE HCL DR 30 MG CAP]  Last Written Prescription Date:  5/9/2018  Last Fill Quantity: 270 caps,  # refills: 3   Last office visit: 8/16/2018 with prescribing provider:  Glory   Future Office Visit:     270 capsule 0     Sig: TAKE 3 CAPSULES (90 MG) BY MOUTH DAILY       Serotonin-Norepinephrine Reuptake Inhibitors  Failed - 5/11/2019 12:09 PM        Failed - Blood pressure under 140/90 in past 12 months     BP Readings from Last 3 Encounters:   04/10/19 (!) 143/93   08/22/18 126/84   08/16/18 127/82                 Failed - PHQ-9 score of less than 5 in past 6 months     Please review last PHQ-9 score.           Failed - Recent (6 mo) or future (30 days) visit within the authorizing provider's specialty     Patient had office visit in the last 6 months or has a visit in the next 30 days with authorizing provider or within the authorizing provider's specialty.  See \"Patient Info\" tab in inbasket, or \"Choose Columns\" in Meds & Orders section of the refill encounter.            Passed - Medication is active on med list        Passed - Patient is age 18 or older        Passed - No active pregnancy on record        Passed - No positive pregnancy test in past 12 months          "

## 2019-05-13 ENCOUNTER — MYC MEDICAL ADVICE (OUTPATIENT)
Dept: FAMILY MEDICINE | Facility: CLINIC | Age: 59
End: 2019-05-13

## 2019-05-13 RX ORDER — DULOXETIN HYDROCHLORIDE 30 MG/1
90 CAPSULE, DELAYED RELEASE ORAL DAILY
Qty: 90 CAPSULE | Refills: 0 | Status: SHIPPED | OUTPATIENT
Start: 2019-05-13 | End: 2019-06-10

## 2019-05-13 NOTE — TELEPHONE ENCOUNTER
Routing refill request to provider for review/approval because:  BP not at goal      PHQ-9 SCORE 11/12/2012 7/25/2017 5/9/2018   PHQ-9 Total Score 0 - -   PHQ-9 Total Score - 0 0     HP MA - please update PHQ9

## 2019-05-14 ASSESSMENT — PATIENT HEALTH QUESTIONNAIRE - PHQ9
SUM OF ALL RESPONSES TO PHQ QUESTIONS 1-9: 0
SUM OF ALL RESPONSES TO PHQ QUESTIONS 1-9: 0

## 2019-05-15 ASSESSMENT — PATIENT HEALTH QUESTIONNAIRE - PHQ9: SUM OF ALL RESPONSES TO PHQ QUESTIONS 1-9: 0

## 2019-05-15 NOTE — TELEPHONE ENCOUNTER
Pt responded to G-CON message. Closing encounter.    PHQ-9 SCORE 7/25/2017 5/9/2018 5/14/2019   PHQ-9 Total Score - - -   PHQ-9 Total Score MyChart - - 0   PHQ-9 Total Score 0 0 0       Niharika Alonso MA

## 2019-06-05 DIAGNOSIS — F33.1 MAJOR DEPRESSIVE DISORDER, RECURRENT EPISODE, MODERATE (H): ICD-10-CM

## 2019-06-05 NOTE — TELEPHONE ENCOUNTER
"Requested Prescriptions   Pending Prescriptions Disp Refills     DULoxetine (CYMBALTA) 30 MG capsule  Last Written Prescription Date:  05/13/2019  Last Fill Quantity: 90,  # refills: 0   Last office visit: 8/16/2018 with prescribing provider:  08/16/2018   Future Office Visit:     90 capsule 0     Sig: Take 3 capsules (90 mg) by mouth daily       Serotonin-Norepinephrine Reuptake Inhibitors  Failed - 6/5/2019 10:17 AM        Failed - Blood pressure under 140/90 in past 12 months     BP Readings from Last 3 Encounters:   04/10/19 (!) 143/93   08/22/18 126/84   08/16/18 127/82                 Failed - Recent (6 mo) or future (30 days) visit within the authorizing provider's specialty     Patient had office visit in the last 6 months or has a visit in the next 30 days with authorizing provider or within the authorizing provider's specialty.  See \"Patient Info\" tab in inbasket, or \"Choose Columns\" in Meds & Orders section of the refill encounter.            Passed - PHQ-9 score of less than 5 in past 6 months     Please review last PHQ-9 score.           Passed - Medication is active on med list        Passed - Patient is age 18 or older        Passed - No active pregnancy on record        Passed - No positive pregnancy test in past 12 months        **PT REQUESTING 90 DAY SUPPLY**  "

## 2019-06-07 DIAGNOSIS — I47.10 PAROXYSMAL SUPRAVENTRICULAR TACHYCARDIA (H): ICD-10-CM

## 2019-06-07 DIAGNOSIS — I10 HYPERTENSION GOAL BP (BLOOD PRESSURE) < 140/90: ICD-10-CM

## 2019-06-07 NOTE — TELEPHONE ENCOUNTER
"Requested Prescriptions   Pending Prescriptions Disp Refills     verapamil ER (CALAN-SR) 240 MG CR tablet  Last Written Prescription Date:  05/09/2018  Last Fill Quantity: 90 tablet,  # refills: PRN   Last Office Visit: 8/16/2018   Future Office Visit:    Next 5 appointments (look out 90 days)    Jun 17, 2019  9:00 AM CDT  PHYSICAL with Mira Draper NP  [facility] ([facility]) [address] [place] [id number] [phone]       90 tablet PRN     Sig: Take 1 tablet (240 mg) by mouth daily       Calcium Channel Blockers Protocol  Failed - 6/7/2019  1:24 PM        Failed - Blood pressure under 140/90 in past 12 months     BP Readings from Last 3 Encounters:   04/10/19 (!) 143/93   08/22/18 126/84   08/16/18 127/82           Failed - Normal ALT in past 12 months     Recent Labs   Lab Test 07/19/17  1047   ALT 37           Failed - Normal serum creatinine on file in past 12 months     Recent Labs   Lab Test 05/30/18   CR 0.63           Passed - Recent (12 mo) or future (30 days) visit within the authorizing provider's specialty     Patient had office visit in the last 12 months or has a visit in the next 30 days with authorizing provider or within the authorizing provider's specialty.  See \"Patient Info\" tab in inbasket, or \"Choose Columns\" in Meds & Orders section of the refill encounter.          Passed - Medication is active on med list        Passed - Patient is age 18 or older        Passed - No active pregnancy on record        Passed - No positive pregnancy test in past 12 months          "

## 2019-06-09 RX ORDER — VERAPAMIL HYDROCHLORIDE 240 MG/1
240 TABLET, FILM COATED, EXTENDED RELEASE ORAL DAILY
Qty: 90 TABLET | Refills: 0 | Status: SHIPPED | OUTPATIENT
Start: 2019-06-09 | End: 2019-06-17

## 2019-06-09 NOTE — TELEPHONE ENCOUNTER
Routing refill request to provider for review/approval because:  BP Readings from Last 3 Encounters:   04/10/19 (!) 143/93   08/22/18 126/84   08/16/18 127/82

## 2019-06-09 NOTE — TELEPHONE ENCOUNTER
Prescription approved per [facility] Refill Protocol.  Isa Trivedi RN

## 2019-06-10 RX ORDER — DULOXETIN HYDROCHLORIDE 30 MG/1
90 CAPSULE, DELAYED RELEASE ORAL DAILY
Qty: 90 CAPSULE | Refills: 0 | Status: SHIPPED | OUTPATIENT
Start: 2019-06-10 | End: 2019-06-17

## 2019-06-17 ENCOUNTER — OFFICE VISIT (OUTPATIENT)
Dept: FAMILY MEDICINE | Facility: CLINIC | Age: 59
End: 2019-06-17
Payer: COMMERCIAL

## 2019-06-17 VITALS
TEMPERATURE: 98.5 F | SYSTOLIC BLOOD PRESSURE: 112 MMHG | HEIGHT: 64 IN | RESPIRATION RATE: 18 BRPM | WEIGHT: 145.5 LBS | DIASTOLIC BLOOD PRESSURE: 73 MMHG | HEART RATE: 69 BPM | OXYGEN SATURATION: 96 % | BODY MASS INDEX: 24.84 KG/M2

## 2019-06-17 DIAGNOSIS — I10 HYPERTENSION GOAL BP (BLOOD PRESSURE) < 140/90: ICD-10-CM

## 2019-06-17 DIAGNOSIS — M18.0 OSTEOARTHRITIS OF BOTH THUMBS: ICD-10-CM

## 2019-06-17 DIAGNOSIS — Z23 NEED FOR SHINGLES VACCINE: ICD-10-CM

## 2019-06-17 DIAGNOSIS — F33.1 MAJOR DEPRESSIVE DISORDER, RECURRENT EPISODE, MODERATE (H): ICD-10-CM

## 2019-06-17 DIAGNOSIS — Z00.00 ROUTINE GENERAL MEDICAL EXAMINATION AT A HEALTH CARE FACILITY: Primary | ICD-10-CM

## 2019-06-17 DIAGNOSIS — B00.1 RECURRENT COLD SORES: ICD-10-CM

## 2019-06-17 DIAGNOSIS — I47.10 PAROXYSMAL SUPRAVENTRICULAR TACHYCARDIA (H): ICD-10-CM

## 2019-06-17 LAB — DEPRECATED CALCIDIOL+CALCIFEROL SERPL-MC: 30 UG/L (ref 20–75)

## 2019-06-17 PROCEDURE — G0145 SCR C/V CYTO,THINLAYER,RESCR: HCPCS | Performed by: NURSE PRACTITIONER

## 2019-06-17 PROCEDURE — 82306 VITAMIN D 25 HYDROXY: CPT | Performed by: NURSE PRACTITIONER

## 2019-06-17 PROCEDURE — 36415 COLL VENOUS BLD VENIPUNCTURE: CPT | Performed by: NURSE PRACTITIONER

## 2019-06-17 PROCEDURE — 80061 LIPID PANEL: CPT | Performed by: NURSE PRACTITIONER

## 2019-06-17 PROCEDURE — 87624 HPV HI-RISK TYP POOLED RSLT: CPT | Performed by: NURSE PRACTITIONER

## 2019-06-17 PROCEDURE — 99396 PREV VISIT EST AGE 40-64: CPT | Performed by: NURSE PRACTITIONER

## 2019-06-17 PROCEDURE — 99214 OFFICE O/P EST MOD 30 MIN: CPT | Mod: 25 | Performed by: NURSE PRACTITIONER

## 2019-06-17 PROCEDURE — 80048 BASIC METABOLIC PNL TOTAL CA: CPT | Performed by: NURSE PRACTITIONER

## 2019-06-17 RX ORDER — VERAPAMIL HYDROCHLORIDE 240 MG/1
240 TABLET, FILM COATED, EXTENDED RELEASE ORAL DAILY
Qty: 90 TABLET | Status: SHIPPED | OUTPATIENT
Start: 2019-06-17 | End: 2020-07-16

## 2019-06-17 RX ORDER — DULOXETIN HYDROCHLORIDE 60 MG/1
60 CAPSULE, DELAYED RELEASE ORAL DAILY
Qty: 90 CAPSULE | Status: SHIPPED | OUTPATIENT
Start: 2019-06-17 | End: 2020-07-16

## 2019-06-17 RX ORDER — VALACYCLOVIR HYDROCHLORIDE 500 MG/1
500 TABLET, FILM COATED ORAL DAILY
Qty: 90 TABLET | Status: SHIPPED | OUTPATIENT
Start: 2019-06-17 | End: 2020-10-09

## 2019-06-17 RX ORDER — LOSARTAN POTASSIUM 50 MG/1
50 TABLET ORAL DAILY
Qty: 90 TABLET | Status: SHIPPED | OUTPATIENT
Start: 2019-06-17 | End: 2020-02-10

## 2019-06-17 ASSESSMENT — MIFFLIN-ST. JEOR: SCORE: 1212.04

## 2019-06-17 ASSESSMENT — ENCOUNTER SYMPTOMS
HEMATOCHEZIA: 0
SHORTNESS OF BREATH: 0
BREAST MASS: 0
WEAKNESS: 0
CONSTIPATION: 0
JOINT SWELLING: 0
DYSURIA: 0
FEVER: 0
FREQUENCY: 0
SORE THROAT: 0
HEMATURIA: 0
ARTHRALGIAS: 1
MYALGIAS: 0
CHILLS: 0
DIZZINESS: 0
PARESTHESIAS: 0
HEARTBURN: 0
EYE PAIN: 0
NAUSEA: 0
PALPITATIONS: 0
ABDOMINAL PAIN: 0
DIARRHEA: 0
HEADACHES: 0
COUGH: 0
NERVOUS/ANXIOUS: 0

## 2019-06-17 NOTE — PROGRESS NOTES
SUBJECTIVE:   CC: Kerri Timmons is an 58 year old woman who presents for preventive health visit.     Healthy Habits:     Getting at least 3 servings of Calcium per day:  Yes    Bi-annual eye exam:  Yes    Dental care twice a year:  Yes    Sleep apnea or symptoms of sleep apnea:  None    Diet:  Regular (no restrictions)    Frequency of exercise:  2-3 days/week    Duration of exercise:  15-30 minutes    Taking medications regularly:  Yes    Medication side effects:  Other    PHQ-2 Total Score: 0    Additional concerns today:  No  Re-visit hypertension   129/84 this morning   She is doing well on her current medication.  She does feel fatigued, but has had this symptom for years and is not sure if it is related to her medication.  She denies chest pain, shortness of breath, dizziness, peripheral edema, or cough.    She does have bilateral thumb pain, worse with use of her hands, present for years, worsening recently.  She does do a lot of grasping and twisting (IVs at work, etc).      She has been getting cold sores more frequently over the past couple of months.  Sometimes her current dose of Valtrex does not work very well.               Today's PHQ-2 Score:   PHQ-2 ( 1999 Pfizer) 6/17/2019   Q1: Little interest or pleasure in doing things 0   Q2: Feeling down, depressed or hopeless 0   PHQ-2 Score 0   Q1: Little interest or pleasure in doing things Not at all   Q2: Feeling down, depressed or hopeless Not at all   PHQ-2 Score 0       Abuse: Current or Past(Physical, Sexual or Emotional)- No  Do you feel safe in your environment? Yes    Social History     Tobacco Use     Smoking status: Light Tobacco Smoker     Types: Cigarettes     Smokeless tobacco: Never Used     Tobacco comment: 3 per week    Substance Use Topics     Alcohol use: Yes     Comment: a couple drinks 3 times per week          Alcohol Use 6/17/2019   Prescreen: >3 drinks/day or >7 drinks/week? No   Prescreen: >3 drinks/day or >7 drinks/week? -  "      Reviewed orders with patient.  Reviewed health maintenance and updated orders accordingly - Yes          Pertinent mammograms are reviewed under the imaging tab.  History of abnormal Pap smear: NO - age 30-65 PAP every 5 years with negative HPV co-testing recommended  PAP / HPV 7/13/2015 5/15/2014 1/4/2012   PAP NIL OTHER-NIL, See Result NIL     Reviewed and updated as needed this visit by clinical staff  Tobacco  Allergies  Meds  Med Hx  Surg Hx  Fam Hx  Soc Hx        Reviewed and updated as needed this visit by Provider            Review of Systems   Constitutional: Negative for chills and fever.   HENT: Negative for congestion, ear pain, hearing loss and sore throat.    Eyes: Negative for pain and visual disturbance.   Respiratory: Negative for cough and shortness of breath.    Cardiovascular: Negative for chest pain, palpitations and peripheral edema.   Gastrointestinal: Negative for abdominal pain, constipation, diarrhea, heartburn, hematochezia and nausea.   Breasts:  Negative for tenderness, breast mass and discharge.   Genitourinary: Negative for dysuria, frequency, genital sores, hematuria, pelvic pain, urgency, vaginal bleeding and vaginal discharge.   Musculoskeletal: Positive for arthralgias. Negative for joint swelling and myalgias.   Skin: Negative for rash.   Neurological: Negative for dizziness, weakness, headaches and paresthesias.   Psychiatric/Behavioral: Negative for mood changes. The patient is not nervous/anxious.           OBJECTIVE:   /73   Pulse 69   Temp 98.5  F (36.9  C) (Oral)   Resp 18   Ht 1.613 m (5' 3.5\")   Wt 66 kg (145 lb 8 oz)   SpO2 96%   BMI 25.37 kg/m    Physical Exam  GENERAL: healthy, alert and no distress  EYES: Eyes grossly normal to inspection, PERRL and conjunctivae and sclerae normal  HENT: ear canals and TM's normal, nose and mouth without ulcers or lesions  NECK: no adenopathy, no asymmetry, masses, or scars and thyroid normal to " palpation  RESP: lungs clear to auscultation - no rales, rhonchi or wheezes  BREAST: normal without masses, tenderness or nipple discharge and no palpable axillary masses or adenopathy  CV: regular rate and rhythm, normal S1 S2, no S3 or S4, no murmur, click or rub, no peripheral edema and peripheral pulses strong  ABDOMEN: soft, nontender, no hepatosplenomegaly, no masses and bowel sounds normal   (female): normal female external genitalia, normal urethral meatus, vaginal mucosa pink, moist, well rugated, and normal cervix/adnexa/uterus without masses or discharge  MS: no gross musculoskeletal defects noted, no edema  SKIN: no suspicious lesions or rashes  NEURO: Normal strength and tone, mentation intact and speech normal  PSYCH: mentation appears normal, affect normal/bright        ASSESSMENT/PLAN:   1. Routine general medical examination at a health care facility    - Lipid panel reflex to direct LDL Fasting  - Pap imaged thin layer screen with HPV - recommended age 30 - 65 years (select HPV order below)  - HPV High Risk Types DNA Cervical  - DX Hip/Pelvis/Spine; Future  - Vitamin D Deficiency    2. Hypertension goal BP (blood pressure) < 140/90  At goal  The current medical regimen is effective;  continue present plan and medications.   - verapamil ER (CALAN-SR) 240 MG CR tablet; Take 1 tablet (240 mg) by mouth daily  Dispense: 90 tablet; Refill: PRN  - losartan (COZAAR) 50 MG tablet; Take 1 tablet (50 mg) by mouth daily  Dispense: 90 tablet; Refill: PRN  - Basic metabolic panel    3. Paroxysmal supraventricular tachycardia (H)    - verapamil ER (CALAN-SR) 240 MG CR tablet; Take 1 tablet (240 mg) by mouth daily  Dispense: 90 tablet; Refill: PRN    4. Major depressive disorder, recurrent episode, moderate (H)  The current medical regimen is effective;  continue present plan and medications.   - DULoxetine (CYMBALTA) 60 MG capsule; Take 1 capsule (60 mg) by mouth daily  Dispense: 90 capsule; Refill: PRN    5.  "Recurrent cold sores  May take 500 mg of Valtrex daily for suppression.   - valACYclovir (VALTREX) 500 MG tablet; Take 1 tablet (500 mg) by mouth daily  Dispense: 90 tablet; Refill: PRN    6. Need for shingles vaccine      7. Osteoarthritis of both thumbs  Discussed the use and indication of this medication as well as potential side effects.   - diclofenac (VOLTAREN) 1 % topical gel; Place 2 g onto the skin 4 times daily Place 2 g to thumbs bilaterally up to 4 times daily PRN  Dispense: 100 g; Refill: PRN    COUNSELING:  Reviewed preventive health counseling, as reflected in patient instructions    Estimated body mass index is 25.37 kg/m  as calculated from the following:    Height as of this encounter: 1.613 m (5' 3.5\").    Weight as of this encounter: 66 kg (145 lb 8 oz).    Weight management plan: Discussed healthy diet and exercise guidelines     reports that she has been smoking cigarettes.  She has never used smokeless tobacco.  Tobacco Cessation Action Plan: Information offered: Patient not interested at this time    Counseling Resources:  ATP IV Guidelines  Pooled Cohorts Equation Calculator  Breast Cancer Risk Calculator  FRAX Risk Assessment  ICSI Preventive Guidelines  Dietary Guidelines for Americans, 2010  USDA's MyPlate  ASA Prophylaxis  Lung CA Screening    Mira Draper NP  [facility]  "

## 2019-06-17 NOTE — NURSING NOTE
PCP ordered Shingrix today.     Clinic is out of Shingrix; sent pt to the pharmacy to check to see if they have any in stock. Otherwise recommended pt contact local pharmacies to check if in stock    Niharika Alonso MA

## 2019-06-18 ENCOUNTER — ANCILLARY PROCEDURE (OUTPATIENT)
Dept: MAMMOGRAPHY | Facility: CLINIC | Age: 59
End: 2019-06-18
Payer: COMMERCIAL

## 2019-06-18 DIAGNOSIS — R92.8 CATEGORY 3 MAMMOGRAPHY RESULT WITH SHORT FOLLOW-UP INTERVAL SUGGESTED FOR PROBABLY BENIGN FINDING: ICD-10-CM

## 2019-06-18 LAB
ANION GAP SERPL CALCULATED.3IONS-SCNC: 11 MMOL/L (ref 3–14)
BUN SERPL-MCNC: 15 MG/DL (ref 7–30)
CALCIUM SERPL-MCNC: 9 MG/DL (ref 8.5–10.1)
CHLORIDE SERPL-SCNC: 106 MMOL/L (ref 94–109)
CHOLEST SERPL-MCNC: 200 MG/DL
CO2 SERPL-SCNC: 23 MMOL/L (ref 20–32)
CREAT SERPL-MCNC: 0.68 MG/DL (ref 0.52–1.04)
GFR SERPL CREATININE-BSD FRML MDRD: >90 ML/MIN/{1.73_M2}
GLUCOSE SERPL-MCNC: 106 MG/DL (ref 70–99)
HDLC SERPL-MCNC: 63 MG/DL
LDLC SERPL CALC-MCNC: 99 MG/DL
NONHDLC SERPL-MCNC: 138 MG/DL
POTASSIUM SERPL-SCNC: 4.2 MMOL/L (ref 3.4–5.3)
SODIUM SERPL-SCNC: 140 MMOL/L (ref 133–144)
TRIGL SERPL-MCNC: 192 MG/DL

## 2019-06-20 LAB
COPATH REPORT: NORMAL
PAP: NORMAL

## 2019-06-21 LAB
FINAL DIAGNOSIS: NORMAL
HPV HR 12 DNA CVX QL NAA+PROBE: NEGATIVE
HPV16 DNA SPEC QL NAA+PROBE: NEGATIVE
HPV18 DNA SPEC QL NAA+PROBE: NEGATIVE
SPECIMEN DESCRIPTION: NORMAL
SPECIMEN SOURCE CVX/VAG CYTO: NORMAL

## 2019-08-06 ENCOUNTER — TRANSFERRED RECORDS (OUTPATIENT)
Dept: HEALTH INFORMATION MANAGEMENT | Facility: CLINIC | Age: 59
End: 2019-08-06

## 2019-09-10 ENCOUNTER — TRANSFERRED RECORDS (OUTPATIENT)
Dept: HEALTH INFORMATION MANAGEMENT | Facility: CLINIC | Age: 59
End: 2019-09-10

## 2019-10-01 ENCOUNTER — HEALTH MAINTENANCE LETTER (OUTPATIENT)
Age: 59
End: 2019-10-01

## 2019-10-25 DIAGNOSIS — F33.1 MAJOR DEPRESSIVE DISORDER, RECURRENT EPISODE, MODERATE (H): ICD-10-CM

## 2019-10-25 RX ORDER — DULOXETIN HYDROCHLORIDE 60 MG/1
60 CAPSULE, DELAYED RELEASE ORAL DAILY
Qty: 0.1 CAPSULE | Refills: 0 | OUTPATIENT
Start: 2019-10-25

## 2019-10-25 NOTE — TELEPHONE ENCOUNTER
"Requested Prescriptions   Pending Prescriptions Disp Refills     DULoxetine (CYMBALTA) 60 MG capsule 90 capsule PRN     Sig: Take 1 capsule (60 mg) by mouth daily       Serotonin-Norepinephrine Reuptake Inhibitors  Passed - 10/25/2019  9:39 AM        Passed - Blood pressure under 140/90 in past 12 months     BP Readings from Last 3 Encounters:   06/17/19 112/73   04/10/19 (!) 143/93   08/22/18 126/84                 Passed - PHQ-9 score of less than 5 in past 6 months     Please review last PHQ-9 score.           Passed - Medication is active on med list        Passed - Patient is age 18 or older        Passed - No active pregnancy on record        Passed - No positive pregnancy test in past 12 months        Passed - Recent (6 mo) or future (30 days) visit within the authorizing provider's specialty     Patient had office visit in the last 6 months or has a visit in the next 30 days with authorizing provider or within the authorizing provider's specialty.  See \"Patient Info\" tab in inbasket, or \"Choose Columns\" in Meds & Orders section of the refill encounter.            Year supply sent 6/17       Refused Prescriptions:                       Disp   Refills    DULoxetine (CYMBALTA) 60 MG capsule        0.1 ca*0        Sig: Take 1 capsule (60 mg) by mouth daily  Refused By: DILIP MONSON  Reason for Refusal: Duplicate    HP Reception - please advise patient refills at pharmacy - she should always contact the pharmacy FIRST - and if it says she is \"out of refills\" on automated system patients should always confirm that by speaking with someone directly  "

## 2019-12-02 ENCOUNTER — TRANSFERRED RECORDS (OUTPATIENT)
Dept: HEALTH INFORMATION MANAGEMENT | Facility: CLINIC | Age: 59
End: 2019-12-02

## 2019-12-14 DIAGNOSIS — B00.1 COLD SORE: ICD-10-CM

## 2019-12-15 NOTE — TELEPHONE ENCOUNTER
Requested Prescriptions   Pending Prescriptions Disp Refills     DENAVIR 1 % external cream [Pharmacy Med Name: DENAVIR 1% CREAM] 5 g 0     Sig: APPLY TOPICALLY EVERY 2 HOURS (WHILE AWAKE)        Last Written Prescription Date:  4/26/19  Last Fill Quantity: 5 g,   # refills: 1  Last Office Visit: 6/17/19  Future Office visit:       Routing refill request to provider for review/approval because:  Drug not on the [facility], [facility] or [facility] refill protocol or controlled substance         There is no refill protocol information for this order

## 2019-12-17 RX ORDER — PENCICLOVIR 10 MG/G
CREAM TOPICAL
Qty: 5 G | Refills: 0 | Status: SHIPPED | OUTPATIENT
Start: 2019-12-17 | End: 2020-03-12

## 2020-02-10 ENCOUNTER — TELEPHONE (OUTPATIENT)
Dept: FAMILY MEDICINE | Facility: CLINIC | Age: 60
End: 2020-02-10

## 2020-02-10 DIAGNOSIS — I10 HYPERTENSION GOAL BP (BLOOD PRESSURE) < 140/90: ICD-10-CM

## 2020-02-10 RX ORDER — IRBESARTAN 150 MG/1
150 TABLET ORAL AT BEDTIME
Qty: 90 TABLET | Refills: 1 | Status: SHIPPED | OUTPATIENT
Start: 2020-02-10 | End: 2020-02-11

## 2020-02-10 NOTE — TELEPHONE ENCOUNTER
Losartan on 2 month backorder.    Please advise on alternative in the interim.    Thanks! Perla Madrigal RN

## 2020-02-10 NOTE — TELEPHONE ENCOUNTER
Patient's pharmacy, [facility], notified her that Losartin is on a 2 month back order.      Was told to call Mira Draper to see if she would recommend a similar alternative.  Patient is completely out of medication.    Please call patient when done or if you have any questions.      Patient uses [facility] in [facility] ([phone])

## 2020-02-11 RX ORDER — VALSARTAN 40 MG/1
80 TABLET ORAL DAILY
Qty: 180 TABLET | Status: SHIPPED | OUTPATIENT
Start: 2020-02-11 | End: 2020-10-29

## 2020-02-11 NOTE — TELEPHONE ENCOUNTER
Pharm has Valsartan 40 mg if that is an acceptable alternative.  If so, please send new Rx with appropriate info.  Thanks [facility]

## 2020-02-11 NOTE — TELEPHONE ENCOUNTER
I will change to Valsartan - 2 tablets daily.    She should recheck her blood pressure after she has been on this new medication for 3 weeks.

## 2020-02-11 NOTE — TELEPHONE ENCOUNTER
Left message advising alternative provided to patients request - may contact pharmacy at this time

## 2020-03-09 ENCOUNTER — TRANSFERRED RECORDS (OUTPATIENT)
Dept: HEALTH INFORMATION MANAGEMENT | Facility: CLINIC | Age: 60
End: 2020-03-09

## 2020-03-12 DIAGNOSIS — B00.1 COLD SORE: ICD-10-CM

## 2020-03-13 RX ORDER — PENCICLOVIR 10 MG/G
CREAM TOPICAL
Qty: 5 G | Refills: 0 | Status: SHIPPED | OUTPATIENT
Start: 2020-03-13 | End: 2020-10-11

## 2020-03-13 NOTE — TELEPHONE ENCOUNTER
Requested Prescriptions   Pending Prescriptions Disp Refills     penciclovir (DENAVIR) 1 % external cream 5 g 0       There is no refill protocol information for this order              Last Written Prescription Date:  12/17/19  Last Fill Quantity: 5g,   # refills: 0  Last Office Visit: 6/17/19  Future Office visit:       Routing refill request to provider for review/approval because:  Drug not on the [facility], P or [facility] refill protocol or controlled substance

## 2020-04-06 NOTE — TELEPHONE ENCOUNTER
losartan (COZAAR) 50 MG tablet (Discontinued)       Last Written Prescription Date:  6-17-19  Last Fill Quantity: 90 tab,   # refills: PRN  Last Office Visit: 6-17-19  Future Office visit:       Routing refill request to provider for review/approval because:  Drug not active on patient's medication list    ____________________________________      Abreva      Last Written Prescription Date:  NOT ON CURRENT MED LIST.   Last Fill Quantity: 0,   # refills: 0  Last Office Visit: 6-17-19  Future Office visit:       Routing refill request to provider for review/approval because:  Drug not active on patient's medication list

## 2020-04-06 NOTE — TELEPHONE ENCOUNTER
Left message to call back and ask to speak with an available triage nurse.  LAZARUS Landaverde, FIDEN, RN

## 2020-04-06 NOTE — TELEPHONE ENCOUNTER
Patient is now on Valsartan on her medication list.  Per pharmacy, patient called them to request these refills.  The pharmacy does not currently have Losartan 50 in stock but they do have plenty of the Valsartan.  Current med list has Denevir rather than Abreva.  Adore Lerma RN

## 2020-04-15 ENCOUNTER — TELEPHONE (OUTPATIENT)
Dept: OPHTHALMOLOGY | Facility: CLINIC | Age: 60
End: 2020-04-15

## 2020-06-15 ENCOUNTER — TRANSFERRED RECORDS (OUTPATIENT)
Dept: HEALTH INFORMATION MANAGEMENT | Facility: CLINIC | Age: 60
End: 2020-06-15

## 2020-06-23 ENCOUNTER — ANCILLARY PROCEDURE (OUTPATIENT)
Dept: MAMMOGRAPHY | Facility: CLINIC | Age: 60
End: 2020-06-23
Attending: NURSE PRACTITIONER
Payer: COMMERCIAL

## 2020-06-23 DIAGNOSIS — Z12.31 SCREENING MAMMOGRAM, ENCOUNTER FOR: ICD-10-CM

## 2020-07-14 DIAGNOSIS — I47.10 PAROXYSMAL SUPRAVENTRICULAR TACHYCARDIA (H): ICD-10-CM

## 2020-07-14 DIAGNOSIS — F33.1 MAJOR DEPRESSIVE DISORDER, RECURRENT EPISODE, MODERATE (H): ICD-10-CM

## 2020-07-14 DIAGNOSIS — I10 HYPERTENSION GOAL BP (BLOOD PRESSURE) < 140/90: ICD-10-CM

## 2020-07-16 RX ORDER — VERAPAMIL HYDROCHLORIDE 240 MG/1
240 TABLET, FILM COATED, EXTENDED RELEASE ORAL DAILY
Qty: 30 TABLET | Refills: 0 | Status: SHIPPED | OUTPATIENT
Start: 2020-07-16 | End: 2020-08-26

## 2020-07-16 RX ORDER — LOSARTAN POTASSIUM 50 MG/1
50 TABLET ORAL DAILY
Qty: 30 TABLET | Refills: 0 | Status: SHIPPED | OUTPATIENT
Start: 2020-07-16 | End: 2020-08-26

## 2020-07-16 RX ORDER — DULOXETIN HYDROCHLORIDE 60 MG/1
60 CAPSULE, DELAYED RELEASE ORAL DAILY
Qty: 30 CAPSULE | Refills: 0 | Status: SHIPPED | OUTPATIENT
Start: 2020-07-16 | End: 2020-08-26

## 2020-07-20 ENCOUNTER — NURSE TRIAGE (OUTPATIENT)
Dept: NURSING | Facility: CLINIC | Age: 60
End: 2020-07-20

## 2020-07-20 NOTE — TELEPHONE ENCOUNTER
Sciatica nerve pain with foot drop last four days, and getting worse today.  Wants to get in to see provider and able to see specialist per her insurance.  Guidelines recommend see provider within four hours, but she prefers to see someone at her own clinic.    Warm transferred to schedulers.    Amy Bonilla RN  [facility]      Additional Information    Negative: Looks like a broken bone or dislocated joint (e.g., crooked or deformed)    Negative: Sounds like a life-threatening emergency to the triager    Negative: Chest pain    Negative: Difficulty breathing    Negative: Entire foot is cool or blue in comparison to other side    Negative: Unable to walk    Negative: [1] Red area or streak AND [2] fever    Negative: [1] Swollen joint AND [2] fever    Negative: [1] Cast on leg or ankle AND [2] now increased pain    Negative: Patient sounds very sick or weak to the triager    [1] SEVERE pain (e.g., excruciating, unable to do any normal activities) AND [2] not improved after 2 hours of pain medicine    Protocols used: LEG PAIN-A-AH

## 2020-07-22 ENCOUNTER — ANCILLARY PROCEDURE (OUTPATIENT)
Dept: GENERAL RADIOLOGY | Facility: CLINIC | Age: 60
End: 2020-07-22
Attending: PHYSICIAN ASSISTANT
Payer: COMMERCIAL

## 2020-07-22 ENCOUNTER — HOSPITAL ENCOUNTER (OUTPATIENT)
Dept: MRI IMAGING | Facility: CLINIC | Age: 60
Discharge: HOME | End: 2020-07-22
Attending: PHYSICIAN ASSISTANT | Admitting: PHYSICIAN ASSISTANT
Payer: COMMERCIAL

## 2020-07-22 ENCOUNTER — OFFICE VISIT (OUTPATIENT)
Dept: FAMILY MEDICINE | Facility: CLINIC | Age: 60
End: 2020-07-22
Payer: COMMERCIAL

## 2020-07-22 ENCOUNTER — OFFICE VISIT (OUTPATIENT)
Dept: PEDIATRICS | Facility: CLINIC | Age: 60
End: 2020-07-22
Attending: NURSE PRACTITIONER
Payer: COMMERCIAL

## 2020-07-22 VITALS
OXYGEN SATURATION: 97 % | DIASTOLIC BLOOD PRESSURE: 100 MMHG | RESPIRATION RATE: 16 BRPM | WEIGHT: 146 LBS | BODY MASS INDEX: 24.92 KG/M2 | SYSTOLIC BLOOD PRESSURE: 160 MMHG | TEMPERATURE: 97.7 F | HEART RATE: 81 BPM | HEIGHT: 64 IN

## 2020-07-22 VITALS
SYSTOLIC BLOOD PRESSURE: 148 MMHG | TEMPERATURE: 98.5 F | OXYGEN SATURATION: 96 % | HEART RATE: 79 BPM | DIASTOLIC BLOOD PRESSURE: 81 MMHG

## 2020-07-22 DIAGNOSIS — R20.9 ALTERED SENSATION, FOOT: ICD-10-CM

## 2020-07-22 DIAGNOSIS — M21.371 FOOT DROP, RIGHT: Primary | ICD-10-CM

## 2020-07-22 DIAGNOSIS — M54.9 ACUTE RIGHT-SIDED BACK PAIN, UNSPECIFIED BACK LOCATION: ICD-10-CM

## 2020-07-22 DIAGNOSIS — M54.9 ACUTE RIGHT-SIDED BACK PAIN, UNSPECIFIED BACK LOCATION: Primary | ICD-10-CM

## 2020-07-22 PROCEDURE — 73590 X-RAY EXAM OF LOWER LEG: CPT | Mod: RT

## 2020-07-22 PROCEDURE — 99213 OFFICE O/P EST LOW 20 MIN: CPT | Performed by: NURSE PRACTITIONER

## 2020-07-22 PROCEDURE — 72148 MRI LUMBAR SPINE W/O DYE: CPT

## 2020-07-22 PROCEDURE — 99214 OFFICE O/P EST MOD 30 MIN: CPT | Performed by: PHYSICIAN ASSISTANT

## 2020-07-22 RX ORDER — METHYLPREDNISOLONE 4 MG
TABLET, DOSE PACK ORAL
Qty: 21 TABLET | Refills: 0 | Status: SHIPPED | OUTPATIENT
Start: 2020-07-22 | End: 2020-10-29

## 2020-07-22 ASSESSMENT — PATIENT HEALTH QUESTIONNAIRE - PHQ9
SUM OF ALL RESPONSES TO PHQ QUESTIONS 1-9: 0
5. POOR APPETITE OR OVEREATING: NOT AT ALL

## 2020-07-22 ASSESSMENT — ANXIETY QUESTIONNAIRES
5. BEING SO RESTLESS THAT IT IS HARD TO SIT STILL: NOT AT ALL
IF YOU CHECKED OFF ANY PROBLEMS ON THIS QUESTIONNAIRE, HOW DIFFICULT HAVE THESE PROBLEMS MADE IT FOR YOU TO DO YOUR WORK, TAKE CARE OF THINGS AT HOME, OR GET ALONG WITH OTHER PEOPLE: NOT DIFFICULT AT ALL
3. WORRYING TOO MUCH ABOUT DIFFERENT THINGS: NOT AT ALL
1. FEELING NERVOUS, ANXIOUS, OR ON EDGE: NOT AT ALL
6. BECOMING EASILY ANNOYED OR IRRITABLE: NOT AT ALL
GAD7 TOTAL SCORE: 0
2. NOT BEING ABLE TO STOP OR CONTROL WORRYING: NOT AT ALL
7. FEELING AFRAID AS IF SOMETHING AWFUL MIGHT HAPPEN: NOT AT ALL

## 2020-07-22 ASSESSMENT — MIFFLIN-ST. JEOR: SCORE: 1209.31

## 2020-07-22 NOTE — PROGRESS NOTES
SUBJECTIVE  HPI: Kerri Timmons is a 59 year old female who presents for evaluation of back pain, radicular pain in right leg and having some foot drop  Symptoms began 1 week(s) ago, have been onset gradual and are unchanged.  Pain is located in the low back right region, with radiation to radiates into the right buttocks, and are at worst a 2 on a scale of 1-10.  Recent injury:golfing  Personal hx of back pain is recurrent self limited episodes of low back pain in the past and previous degenerative joint disease of the lumbar spine.  Pain is exacerbated by: bending and changing position.  Pain is relieved by: Tylenol   Associated sx include: right foot drop symptoms.  Red flag symptoms: negative, fever, chills, night sweats.    Past Medical History:   Diagnosis Date     ASCUS on Pap smear 11/30/2010    colp wnl     Cataract, right eye 06/17/2017     Depressive disorder      Diabetes (H)     h/o gestational     Hypertension      SVT (supraventricular tachycardia) (H) 2008     Allergies   Allergen Reactions     No Known Drug Allergies      Social History     Tobacco Use     Smoking status: Light Tobacco Smoker     Types: Cigarettes     Smokeless tobacco: Never Used     Tobacco comment: 3 per week    Substance Use Topics     Alcohol use: Yes     Comment: a couple drinks 3 times per week      Family History   Problem Relation Age of Onset     C.A.D. Father         early 60's     Hypertension Father      Alzheimer Disease Brother      Hypertension Sister      Glaucoma No family hx of      Macular Degeneration No family hx of      Retinal detachment No family hx of      Amblyopia No family hx of        ROS:  CONSTITUTIONAL:NEGATIVE for fever, chills, change in weight  INTEGUMENTARY/SKIN: NEGATIVE for worrisome rashes, moles or lesions  ENT/MOUTH: NEGATIVE for ear, mouth and throat problems  RESP:NEGATIVE for significant cough or SOB  CV: NEGATIVE for chest pain, palpitations or peripheral edema  GI: NEGATIVE for nausea,  abdominal pain, heartburn, or change in bowel habits  : negative for and dysuria  MUSCULOSKELETAL: POSITIVE  for lower back pain, radicular pain  NEURO: POSITIVE for right foot drop symptoms  VASC: NEGATIVE for cool extremities    OBJECTIVE:  BP (!) 148/81   Pulse 79   Temp 98.5  F (36.9  C) (Oral)   SpO2 96%   Back examination: Back symmetric, no curvature. ROM normal. No CVA tenderness., positive findings: tenderness to palpation right lumbar area  STRAIGHT leg raise test: negative  GENERAL APPEARANCE: healthy, alert and no distress  RESP: lungs clear to auscultation - no rales, rhonchi or wheezes  CV: regular rates and rhythm, normal S1 S2, no murmur noted  ABDOMEN:  soft, nontender, no HSM or masses and bowel sounds normal  NEURO: Normal strength and tone with no weakness or sensory deficit noted, reflexes normal   Extremities: Positive for weakness with dorsiflexion of great toe and foot on right side  MS:  Positive for lower back tenderness  SKIN: no suspicious lesions or rashes  LYMPHATICS: negative for lymph node involvement    Results for orders placed or performed in visit on 07/22/20   XR Tibia & Fibula Right 2 Views     Status: None (Preliminary result)    Narrative    RIGHT TIBIA AND FIBULA TWO VIEWS  7/22/2020 2:42 PM     HISTORY:  Altered sensation, foot.      Impression    IMPRESSION: Unremarkable exam.   Results for orders placed or performed in visit on 07/22/20   MR Lumbar Spine w/o Contrast     Status: None    Narrative    MRI LUMBAR SPINE WITHOUT CONTRAST   7/22/2020 2:21 PM     HISTORY:  Acute right-sided back pain. Altered sensation, foot.    TECHNIQUE: Multiplanar multisequence MRI of the lumbar spine without  contrast.     COMPARISON: None.     FINDINGS: There are 5 lumbar-type vertebral bodies assumed for the  purposes of this dictation. The distal spinal cord and cauda equina  appear normal.     Mild grade 1 anterolisthesis of L4 on L5 likely due to degenerative  facet arthropathy. No  loss of vertebral body height. There are no  destructive osseous lesions. No STIR hyperintense endplate marrow  edema (Modic type I changes).    Level by level as follows:     T12-L1: Mild loss of disc height. No significant disc herniation.  Normal facets. No spinal canal or neural foraminal narrowing.     L1-L2: No loss of disc height. No significant disc herniation. Normal  facets. No spinal canal or neural foraminal narrowing.     L2-L3: No loss of disc height. No significant disc herniation. Normal  facets. No spinal canal or neural foraminal narrowing.     L3-L4: Mild loss of disc height. Small right foraminal disc protrusion  with annular fissuring of disc in the right foraminal region. Normal  facets. No spinal canal narrowing. Mild right neural foraminal  narrowing. No left neural foraminal narrowing.     L4-L5: Mild grade 1 anterolisthesis with uncovering of the disc. Small  posterior disc bulge. Moderate bilateral facet hypertrophy with small  bilateral facet joint effusions. No spinal canal narrowing. Mild  bilateral neural foraminal narrowing.     L5-S1: Mild loss of disc height. Posterior disc bulge with bilateral  uncinate spurring. Marked bilateral facet hypertrophy with small facet  joint effusions. Probable small synovial cysts projecting anteriorly  from the facet joints bilaterally in the vicinity of the neural  foramen measuring up to 0.4 cm on the right (series 4 image 4) and 0.3  cm on the left (series 4 image 14). No spinal canal narrowing. Severe  right neural foraminal narrowing. Moderate left neural foraminal  narrowing.    Paraspinous soft tissues: Unremarkable.       Impression    IMPRESSION:    1. Degenerative changes throughout the lumbar spine as detailed above,  most pronounced at the L5-S1 level.  2. At L5-S1, there is severe right and moderate left neural foraminal  narrowing secondary to disc bulge, facet hypertrophy, and probable  small bilateral synovial cysts in the foraminal  regions, right greater  than left.  3. Mild neural foraminal narrowing also present at L3-L4 secondary to  a disc protrusion. There is also annular fissuring of the disc in the  right foraminal region abutting the exiting nerve root.  4. Mild bilateral neural foraminal narrowing at L4-L5 with mild grade  1 anterolisthesis secondary to facet arthropathy.    JOHNIE GRUBER MD       ASSESSMENT/IMPRESSION/PLAN      ICD-10-CM    1. Foot drop, right  M21.371 ORTHOPEDICS PEDS REFERRAL   2. Acute right-sided back pain, unspecified back location  M54.9 Referral to Acute and Diagnostic Services (Day of diagnostic / First order acute)     MR Lumbar Spine w/o Contrast     methylPREDNISolone (MEDROL DOSEPAK) 4 MG tablet therapy pack     ORTHOPEDICS PEDS REFERRAL   3. Altered sensation, foot  R20.9 Referral to Acute and Diagnostic Services (Day of diagnostic / First order acute)     MR Lumbar Spine w/o Contrast     XR Tibia & Fibula Right 2 Views     ORTHOPEDICS PEDS REFERRAL     EDUCATION      1.  Continue stretching and strengthening exercises.       2.  Continue prn heat or ice application.    Orders Placed This Encounter     MR Lumbar Spine w/o Contrast     XR Tibia & Fibula Right 2 Views     ORTHOPEDICS PEDS REFERRAL     methylPREDNISolone (MEDROL DOSEPAK) 4 MG tablet therapy pack     Patient given medrol for disk herniations and referred pain into right lower leg  Referral to orthopedics for foot drop and DDD with herniations

## 2020-07-22 NOTE — PATIENT INSTRUCTIONS
Friday 7/24 at 1:50, arrive 15 minutes early  [facility]  Dr. Alvaro Posey  [phone]    Check e-mail and fill out questionnaire prior to appointment. If unable to complete questionnaire please arrive 30 minutes early.     Please wear a mask, no visitors are allowed at the O office.

## 2020-07-22 NOTE — PROGRESS NOTES
Lupis Timmons is a 59 year old female who presents to clinic today for the following health issues:    HPI   Musculoskeletal problem/pain      Duration: 1 week    Description  Location: right leg    Intensity:  moderate    Accompanying signs and symptoms: radiation of pain to right buttock    History  Previous similar problem: YES  Previous evaluation:  none    Precipitating or alleviating factors:  Trauma or overuse: YES- golfing but not sure  Aggravating factors include: standing, walking and foot drop    Therapies tried and outcome: rest/inactivity, heat and stretching  Worried she had foot drop  Feels like she is dragging her foot  Has tripped and fell twice in the last week.        Patient Active Problem List   Diagnosis     Abnormal maternal glucose tolerance, antepartum     Malaise and fatigue     Disturbance in sleep behavior     Paroxysmal supraventricular tachycardia (H)     Cold sore     CARDIOVASCULAR SCREENING; LDL GOAL LESS THAN 160     ASCUS  Pap smear; + high risk HPV DNA     Health Care Home     Enthesopathy     Post concussive syndrome     [place] (hard of hearing)     Cataract, right eye     Hypertension goal BP (blood pressure) < 140/90     Major depressive disorder, recurrent episode, moderate (H)     Past Surgical History:   Procedure Laterality Date     APPENDECTOMY       CATARACT IOL, RT/LT Right 08/24/2018     CHOLECYSTECTOMY       PHACOEMULSIFICATION WITH STANDARD INTRAOCULAR LENS IMPLANT Right 8/22/2018    Procedure: PHACOEMULSIFICATION WITH STANDARD INTRAOCULAR LENS IMPLANT;  Right Eye Phacoemulsification with Standard Intraocular Lens;  Surgeon: Johny Daniels MD;  Location:  OR       Social History     Tobacco Use     Smoking status: Light Tobacco Smoker     Types: Cigarettes     Smokeless tobacco: Never Used     Tobacco comment: 3 per week    Substance Use Topics     Alcohol use: Yes     Comment: a couple drinks 3 times per week      Family History   Problem Relation  Age of Onset     C.A.D. Father         early 60's     Hypertension Father      Alzheimer Disease Brother      Hypertension Sister      Glaucoma No family hx of      Macular Degeneration No family hx of      Retinal detachment No family hx of      Amblyopia No family hx of          Current Outpatient Medications   Medication Sig Dispense Refill     DULoxetine (CYMBALTA) 60 MG capsule Take 1 capsule (60 mg) by mouth daily DUE FOR APPOINTMENT July 2020-NO FURTHER REFILLS 30 capsule 0     losartan (COZAAR) 50 MG tablet Take 1 tablet (50 mg) by mouth daily DUE FOR APPOINTMENT July 2020-NO FURTHER REFILLS 30 tablet 0     valsartan (DIOVAN) 40 MG tablet Take 2 tablets (80 mg) by mouth daily 180 tablet PRN     calcium carbonate 500 MG tablet Take 1 tablet by mouth daily. 100 tablet 3     diclofenac (VOLTAREN) 1 % topical gel Place 2 g onto the skin 4 times daily Place 2 g to thumbs bilaterally up to 4 times daily PRN (Patient not taking: Reported on 7/22/2020) 100 g PRN     fluocinonide (LIDEX) 0.05 % external cream Apply a thin layer to affected area BID x 2 weeks, tapering with improvement. Do not apply to face or body folds. (Patient not taking: Reported on 6/17/2019) 15 g 0     methylPREDNISolone (MEDROL DOSEPAK) 4 MG tablet therapy pack Follow package instructions 21 tablet 0     MULTI-VITAMIN OR TABS as directed (Patient not taking: No sig reported) 100 0     penciclovir (DENAVIR) 1 % external cream APPLY TOPICALLY EVERY 2 HOURS (WHILE AWAKE) (Patient not taking: Reported on 7/22/2020) 5 g 0     valACYclovir (VALTREX) 1000 mg tablet Take 2 tablets (2,000 mg) by mouth 2 times daily for 1 day 20 tablet PRN     valACYclovir (VALTREX) 500 MG tablet Take 1 tablet (500 mg) by mouth daily (Patient not taking: Reported on 7/22/2020) 90 tablet PRN     verapamil ER (CALAN-SR) 240 MG CR tablet Take 1 tablet (240 mg) by mouth daily DUE FOR APPOINTMENT July 2020-NO FURTHER REFILLS (Patient not taking: Reported on 7/22/2020) 30  "tablet 0     Allergies   Allergen Reactions     No Known Drug Allergies        Reviewed and updated as needed this visit by Provider  Tobacco  Allergies  Meds  Problems  Med Hx  Surg Hx  Fam Hx         Review of Systems   Constitutional, HEENT, cardiovascular, pulmonary, GI, , musculoskeletal, neuro, skin, endocrine and psych systems are negative, except as otherwise noted.      Objective    BP (!) 160/100 (BP Location: Right arm, Patient Position: Sitting, Cuff Size: Adult Regular)   Pulse 81   Temp 97.7  F (36.5  C) (Tympanic)   Resp 16   Ht 1.613 m (5' 3.5\")   Wt 66.2 kg (146 lb)   SpO2 97%   BMI 25.46 kg/m    Body mass index is 25.46 kg/m .  Physical Exam   GENERAL: healthy, alert and no distress  NECK: no adenopathy, no asymmetry, masses, or scars and thyroid normal to palpation  RESP: lungs clear to auscultation - no rales, rhonchi or wheezes  CV: regular rate and rhythm, normal S1 S2, no S3 or S4, no murmur, click or rub, no peripheral edema and peripheral pulses strong  MS: no gross musculoskeletal defects noted, no edema  SKIN: no suspicious lesions or rashes  NEURO: Normal strength and tone, sensory exam grossly normal, mentation intact and gait abnormal, dragging right foot          Assessment & Plan       ICD-10-CM    1. Acute right-sided back pain, unspecified back location  M54.9 Referral to Acute and Diagnostic Services (Day of diagnostic / First order acute)   2. Altered sensation, foot  R20.9 Referral to Acute and Diagnostic Services (Day of diagnostic / First order acute)      refer to [facility] for urgent eval of possible spinal/neuro involvement  Tobacco Cessation:   reports that she has been smoking cigarettes. She has never used smokeless tobacco.      BMI:   Estimated body mass index is 25.46 kg/m  as calculated from the following:    Height as of this encounter: 1.613 m (5' 3.5\").    Weight as of this encounter: 66.2 kg (146 lb).           See Patient Instructions    No follow-ups " on file.    [facility]

## 2020-07-23 ASSESSMENT — ANXIETY QUESTIONNAIRES: GAD7 TOTAL SCORE: 0

## 2020-07-24 ENCOUNTER — TRANSFERRED RECORDS (OUTPATIENT)
Dept: HEALTH INFORMATION MANAGEMENT | Facility: CLINIC | Age: 60
End: 2020-07-24

## 2020-08-26 ENCOUNTER — TELEPHONE (OUTPATIENT)
Dept: FAMILY MEDICINE | Facility: CLINIC | Age: 60
End: 2020-08-26

## 2020-08-26 DIAGNOSIS — I47.10 PAROXYSMAL SUPRAVENTRICULAR TACHYCARDIA (H): ICD-10-CM

## 2020-08-26 DIAGNOSIS — I10 HYPERTENSION GOAL BP (BLOOD PRESSURE) < 140/90: ICD-10-CM

## 2020-08-26 DIAGNOSIS — F33.1 MAJOR DEPRESSIVE DISORDER, RECURRENT EPISODE, MODERATE (H): ICD-10-CM

## 2020-08-26 RX ORDER — VERAPAMIL HYDROCHLORIDE 240 MG/1
240 TABLET, FILM COATED, EXTENDED RELEASE ORAL DAILY
Qty: 30 TABLET | Refills: 0 | Status: SHIPPED | OUTPATIENT
Start: 2020-08-26 | End: 2020-10-22

## 2020-08-26 RX ORDER — DULOXETIN HYDROCHLORIDE 60 MG/1
60 CAPSULE, DELAYED RELEASE ORAL DAILY
Qty: 30 CAPSULE | Refills: 0 | Status: SHIPPED | OUTPATIENT
Start: 2020-08-26 | End: 2020-10-22

## 2020-08-26 RX ORDER — LOSARTAN POTASSIUM 50 MG/1
50 TABLET ORAL DAILY
Qty: 30 TABLET | Refills: 0 | Status: SHIPPED | OUTPATIENT
Start: 2020-08-26 | End: 2020-10-22

## 2020-08-26 NOTE — TELEPHONE ENCOUNTER
1 refill of meds sent to pharmacy, has visit scheduled for 9/22/2020. lmom that med was sent to [facility].    Antonette Garber RN

## 2020-08-26 NOTE — TELEPHONE ENCOUNTER
Reason for Call:  Medication or medication refill:    Do you use a [facility]?  Name of the pharmacy and phone number for the current request:  [facility]    Name of the medication requested:t,DULoxetine (CYMBALTA) 60 MG capsule,verapamil ER (CALAN-SR) 240 MG CR tablet,losartan (COZAAR) 50 MG tablet    Other request: scheduled annual, has 3 days left of meds    Can we leave a detailed message on this number? YES    Phone number patient can be reached at: Home number on file [phone] (home)    Best Time: asap    Call taken on 8/26/2020 at 9:15 AM by Melissa Mendoza

## 2020-10-08 DIAGNOSIS — B00.1 COLD SORE: ICD-10-CM

## 2020-10-08 DIAGNOSIS — B00.1 RECURRENT COLD SORES: ICD-10-CM

## 2020-10-09 RX ORDER — VALACYCLOVIR HYDROCHLORIDE 500 MG/1
500 TABLET, FILM COATED ORAL DAILY
Qty: 30 TABLET | Refills: 0 | Status: SHIPPED | OUTPATIENT
Start: 2020-10-09 | End: 2020-10-29

## 2020-10-09 NOTE — TELEPHONE ENCOUNTER
Routing refill request to provider for review/approval because:  Drug not on the [facility] refill protocol       Last Written Prescription Date:  3/13/2020  Last Fill Quantity: 5 g,  # refills: 0   Last office visit: 7/22/2020 with prescribing provider:     Future Office Visit:

## 2020-10-11 RX ORDER — PENCICLOVIR 10 MG/G
CREAM TOPICAL
Qty: 5 G | Refills: 0 | Status: SHIPPED | OUTPATIENT
Start: 2020-10-11 | End: 2020-10-29

## 2020-10-12 ENCOUNTER — TRANSFERRED RECORDS (OUTPATIENT)
Dept: HEALTH INFORMATION MANAGEMENT | Facility: CLINIC | Age: 60
End: 2020-10-12

## 2020-10-18 DIAGNOSIS — F33.1 MAJOR DEPRESSIVE DISORDER, RECURRENT EPISODE, MODERATE (H): ICD-10-CM

## 2020-10-18 DIAGNOSIS — I10 HYPERTENSION GOAL BP (BLOOD PRESSURE) < 140/90: ICD-10-CM

## 2020-10-18 DIAGNOSIS — I47.10 PAROXYSMAL SUPRAVENTRICULAR TACHYCARDIA (H): ICD-10-CM

## 2020-10-22 ENCOUNTER — TELEPHONE (OUTPATIENT)
Dept: FAMILY MEDICINE | Facility: CLINIC | Age: 60
End: 2020-10-22

## 2020-10-22 RX ORDER — DULOXETIN HYDROCHLORIDE 60 MG/1
CAPSULE, DELAYED RELEASE ORAL
Qty: 30 CAPSULE | Refills: 0 | Status: SHIPPED | OUTPATIENT
Start: 2020-10-22 | End: 2020-10-29

## 2020-10-22 RX ORDER — VERAPAMIL HYDROCHLORIDE 240 MG/1
TABLET, FILM COATED, EXTENDED RELEASE ORAL
Qty: 30 TABLET | Refills: 0 | Status: SHIPPED | OUTPATIENT
Start: 2020-10-22 | End: 2020-10-29

## 2020-10-22 RX ORDER — LOSARTAN POTASSIUM 50 MG/1
TABLET ORAL
Qty: 30 TABLET | Refills: 0 | Status: SHIPPED | OUTPATIENT
Start: 2020-10-22 | End: 2020-10-29

## 2020-10-22 NOTE — TELEPHONE ENCOUNTER
Jasmine-  1. Per 10/22/20 telephone encounter, patient reports being out of medication  2. One month supplies pended  3. Patient scheduled for phone visit with you on 10/29/20    Thank you!  FIDE UriarteN, RN

## 2020-10-22 NOTE — TELEPHONE ENCOUNTER
Routing refill request to provider for review/approval because:  Cindy given x1 and patient did not follow up, please advise

## 2020-10-22 NOTE — TELEPHONE ENCOUNTER
Patient called to inquire about the status of this request. States she is out of medication. Please assist. Thanks!

## 2020-10-22 NOTE — TELEPHONE ENCOUNTER
Reason for Call:  Other prescription    Detailed comments: patient needs refills for:    DULoxetine (CYMBALTA) 60 MG capsule    losartan (COZAAR) 50 MG tablet    verapamil ER (CALAN-SR) 240 MG CR tablet    [id number] [place], [place]    Please refill today ASAP, patient is out of medications, patient has a scheduled phone visit on 10-29-20 for refills.    Phone Number Patient can be reached at: Home number on file [phone] (home)    Best Time: asap    Can we leave a detailed message on this number? YES    Call taken on 10/22/2020 at 1:35 PM by Mary Aviles

## 2020-10-29 ENCOUNTER — VIRTUAL VISIT (OUTPATIENT)
Dept: FAMILY MEDICINE | Facility: CLINIC | Age: 60
End: 2020-10-29
Payer: COMMERCIAL

## 2020-10-29 DIAGNOSIS — B00.1 RECURRENT COLD SORES: ICD-10-CM

## 2020-10-29 DIAGNOSIS — I10 HYPERTENSION GOAL BP (BLOOD PRESSURE) < 140/90: ICD-10-CM

## 2020-10-29 DIAGNOSIS — Z12.11 SPECIAL SCREENING FOR MALIGNANT NEOPLASMS, COLON: Primary | ICD-10-CM

## 2020-10-29 DIAGNOSIS — I47.10 PAROXYSMAL SUPRAVENTRICULAR TACHYCARDIA (H): ICD-10-CM

## 2020-10-29 DIAGNOSIS — B00.1 COLD SORE: ICD-10-CM

## 2020-10-29 DIAGNOSIS — F33.1 MAJOR DEPRESSIVE DISORDER, RECURRENT EPISODE, MODERATE (H): ICD-10-CM

## 2020-10-29 PROCEDURE — 96127 BRIEF EMOTIONAL/BEHAV ASSMT: CPT | Mod: 59 | Performed by: NURSE PRACTITIONER

## 2020-10-29 PROCEDURE — 99214 OFFICE O/P EST MOD 30 MIN: CPT | Mod: TEL | Performed by: NURSE PRACTITIONER

## 2020-10-29 RX ORDER — VERAPAMIL HYDROCHLORIDE 240 MG/1
TABLET, FILM COATED, EXTENDED RELEASE ORAL
Qty: 90 TABLET | Refills: 0 | Status: SHIPPED | OUTPATIENT
Start: 2020-10-29 | End: 2021-05-10

## 2020-10-29 RX ORDER — DULOXETIN HYDROCHLORIDE 60 MG/1
60 CAPSULE, DELAYED RELEASE ORAL DAILY
Qty: 90 CAPSULE | Refills: 0 | Status: SHIPPED | OUTPATIENT
Start: 2020-10-29 | End: 2021-05-26

## 2020-10-29 RX ORDER — VALACYCLOVIR HYDROCHLORIDE 500 MG/1
500 TABLET, FILM COATED ORAL DAILY
Qty: 90 TABLET | Refills: 0 | Status: SHIPPED | OUTPATIENT
Start: 2020-10-29 | End: 2021-01-18

## 2020-10-29 RX ORDER — PENCICLOVIR 10 MG/G
CREAM TOPICAL
Qty: 5 G | Status: SHIPPED | OUTPATIENT
Start: 2020-10-29 | End: 2022-07-19

## 2020-10-29 RX ORDER — LOSARTAN POTASSIUM 100 MG/1
100 TABLET ORAL DAILY
Qty: 90 TABLET | Refills: 0 | Status: SHIPPED | OUTPATIENT
Start: 2020-10-29 | End: 2021-05-10

## 2020-10-29 ASSESSMENT — ANXIETY QUESTIONNAIRES
1. FEELING NERVOUS, ANXIOUS, OR ON EDGE: NOT AT ALL
5. BEING SO RESTLESS THAT IT IS HARD TO SIT STILL: NOT AT ALL
GAD7 TOTAL SCORE: 0
IF YOU CHECKED OFF ANY PROBLEMS ON THIS QUESTIONNAIRE, HOW DIFFICULT HAVE THESE PROBLEMS MADE IT FOR YOU TO DO YOUR WORK, TAKE CARE OF THINGS AT HOME, OR GET ALONG WITH OTHER PEOPLE: NOT DIFFICULT AT ALL
6. BECOMING EASILY ANNOYED OR IRRITABLE: NOT AT ALL
3. WORRYING TOO MUCH ABOUT DIFFERENT THINGS: NOT AT ALL
2. NOT BEING ABLE TO STOP OR CONTROL WORRYING: NOT AT ALL
7. FEELING AFRAID AS IF SOMETHING AWFUL MIGHT HAPPEN: NOT AT ALL

## 2020-10-29 ASSESSMENT — PATIENT HEALTH QUESTIONNAIRE - PHQ9
5. POOR APPETITE OR OVEREATING: NOT AT ALL
SUM OF ALL RESPONSES TO PHQ QUESTIONS 1-9: 0

## 2020-10-29 NOTE — PROGRESS NOTES
"Kerri Timmons is a 60 year old female who is being evaluated via a billable telephone visit.      The patient has been notified of following:     \"This telephone visit will be conducted via a call between you and your physician/provider. We have found that certain health care needs can be provided without the need for a physical exam.  This service lets us provide the care you need with a short phone conversation.  If a prescription is necessary we can send it directly to your pharmacy.  If lab work is needed we can place an order for that and you can then stop by our lab to have the test done at a later time.    Telephone visits are billed at different rates depending on your insurance coverage. During this emergency period, for some insurers they may be billed the same as an in-person visit.  Please reach out to your insurance provider with any questions.    If during the course of the call the physician/provider feels a telephone visit is not appropriate, you will not be charged for this service.\"    Patient has given verbal consent for Telephone visit?  Yes    What phone number would you like to be contacted at? [phone] (W)     How would you like to obtain your AVS? Migdalia Baugh     Kerri Timmons is a 60 year old female who presents via phone visit today for the following health issues:    HPI        Refill her medication :     DULoxetine (CYMBALTA) 60 MG capsule     losartan (COZAAR) 50 MG tablet     verapamil ER (CALAN-SR) 240 MG CR tablet    penciclovir (DENAVIR) 1 % external cream    valACYclovir (VALTREX) 500 MG tablet    Depression and Anxiety Follow-Up    How are you doing with your depression since your last visit? No change    How are you doing with your anxiety since your last visit?  No change    Are you having other symptoms that might be associated with depression or anxiety? No    Have you had a significant life event? OTHER: she recently       Do you have any concerns with your " use of alcohol or other drugs? No    Social History     Tobacco Use     Smoking status: Light Tobacco Smoker     Types: Cigarettes     Smokeless tobacco: Never Used     Tobacco comment: 3 per week    Substance Use Topics     Alcohol use: Yes     Comment: a couple drinks 3 times per week      Drug use: No     PHQ 5/14/2019 7/22/2020 10/29/2020   PHQ-9 Total Score 0 0 0   Q9: Thoughts of better off dead/self-harm past 2 weeks Not at all Not at all Not at all     VICTOR HUGO-7 SCORE 7/22/2020 10/29/2020   Total Score 0 0         Suicide Assessment Five-step Evaluation and Treatment (SAFE-T)      How many servings of fruits and vegetables do you eat daily?  2-3    On average, how many sweetened beverages do you drink each day (Examples: soda, juice, sweet tea, etc.  Do NOT count diet or artificially sweetened beverages)?   0    How many days per week do you exercise enough to make your heart beat faster? Golf when its nice and intermittent workout     How many minutes a day do you exercise enough to make your heart beat faster? Depends on the day or activity   How many days per week do you miss taking your medication? 0    Her blood pressure has been around 125/90 at home.  She is doing well on her current dose of medication and denies any side effects.    She occasionally gets a bad outbreak of cold sores.               Review of Systems   CONSTITUTIONAL: NEGATIVE for fever, chills, change in weight  ENT/MOUTH: NEGATIVE for ear, mouth and throat problems  RESP: NEGATIVE for significant cough or SOB  CV: NEGATIVE for chest pain, palpitations or peripheral edema  GI: NEGATIVE for nausea, abdominal pain, heartburn, or change in bowel habits  MUSCULOSKELETAL: thumb pain  NEURO: NEGATIVE for weakness, dizziness or paresthesias  PSYCHIATRIC: NEGATIVE for changes in mood or affect       Objective          Vitals:  No vitals were obtained today due to virtual visit.    healthy, alert and no distress  PSYCH: Alert and oriented times 3;  coherent speech, normal   rate and volume, able to articulate logical thoughts, able   to abstract reason, no tangential thoughts, no hallucinations   or delusions  Her affect is normal  RESP: No cough, no audible wheezing, able to talk in full sentences  Remainder of exam unable to be completed due to telephone visits            Assessment/Plan:    Assessment & Plan     Hypertension goal BP (blood pressure) < 140/90  Not at goal  Increase Losartan to 100 mg.  Follow up for a physical in one month.  - losartan (COZAAR) 100 MG tablet; Take 1 tablet (100 mg) by mouth daily  - verapamil ER (CALAN-SR) 240 MG CR tablet; TAKE 1 TABLET(240 MG) BY MOUTH DAILY    Paroxysmal supraventricular tachycardia (H)  Refills given.   - verapamil ER (CALAN-SR) 240 MG CR tablet; TAKE 1 TABLET(240 MG) BY MOUTH DAILY    Recurrent cold sores  The current medical regimen is effective;  continue present plan and medications.   - valACYclovir (VALTREX) 500 MG tablet; Take 1 tablet (500 mg) by mouth daily    Major depressive disorder, recurrent episode, moderate (H)  In remission  She does have a tough time in winter, discussed trying to start exercising regularly, take vitamin 2000 international units daily.   The current medical regimen is effective;  continue present plan and medications.   - DULoxetine (CYMBALTA) 60 MG capsule; Take 1 capsule (60 mg) by mouth daily    Cold sore  Refills given.   - penciclovir (DENAVIR) 1 % external cream; APPLY EVERY 2 HOURS AS DIRECTED    Special screening for malignant neoplasms, colon    - GASTROENTEROLOGY ADULT REF PROCEDURE ONLY; Future     Tobacco Cessation:   reports that she has been smoking cigarettes. She has never used smokeless tobacco.  Tobacco Cessation Action Plan: is only smoking 1-2 cigarettes a week, will try quitting altogether             No follow-ups on file.    Mira Draper NP  [facility]    Phone call duration:  29 minutes

## 2020-10-30 ASSESSMENT — ANXIETY QUESTIONNAIRES: GAD7 TOTAL SCORE: 0

## 2020-12-30 ENCOUNTER — OFFICE VISIT (OUTPATIENT)
Dept: DERMATOLOGY | Facility: CLINIC | Age: 60
End: 2020-12-30
Payer: COMMERCIAL

## 2020-12-30 VITALS — SYSTOLIC BLOOD PRESSURE: 134 MMHG | DIASTOLIC BLOOD PRESSURE: 86 MMHG

## 2020-12-30 DIAGNOSIS — B00.2 ORAL HERPES SIMPLEX INFECTION: ICD-10-CM

## 2020-12-30 DIAGNOSIS — L85.3 XEROSIS OF SKIN: ICD-10-CM

## 2020-12-30 DIAGNOSIS — D48.5 NEOPLASM OF UNCERTAIN BEHAVIOR OF SKIN: Primary | ICD-10-CM

## 2020-12-30 PROCEDURE — 99214 OFFICE O/P EST MOD 30 MIN: CPT | Mod: 25 | Performed by: PHYSICIAN ASSISTANT

## 2020-12-30 PROCEDURE — 11102 TANGNTL BX SKIN SINGLE LES: CPT | Performed by: PHYSICIAN ASSISTANT

## 2020-12-30 PROCEDURE — 88305 TISSUE EXAM BY PATHOLOGIST: CPT | Performed by: PATHOLOGY

## 2020-12-30 NOTE — PATIENT INSTRUCTIONS
Proper skin care from [facility]:    -Eliminate harsh soaps as they strip the natural oils from the skin, often resulting in dry itchy skin ( i.e. Dial, Zest, Carlota Spring)  -Use mild soaps such as Cetaphil or Dove Sensitive Skin in the shower. You do not need to use soap on arms, legs, and trunk every time you shower unless visibly soiled.   -Avoid hot or cold showers.  -After showering, lightly dry off and apply moisturizing within 2-3 minutes. This will help trap moisture in the skin.   -Aggressive use of a moisturizer at least 1-2 times a day to the entire body (including -Vanicream, Cetaphil, Aquaphor or Cerave) and moisturize hands after every washing.  -We recommend using moisturizers that come in a tub that needs to be scooped out, not a pump. This has more of an oil base. It will hold moisture in your skin much better than a water base moisturizer. The above recommended are non-pore clogging.      Wear a sunscreen with at least SPF 30 on your face, ears, neck and V of the chest daily. Wear sunscreen on other areas of the body if those areas are exposed to the sun throughout the day. Sunscreens can contain physical and/or chemical blockers. Physical blockers are less likely to clog pores, these include zinc oxide and titanium dioxide. Reapply every two hour and after swimming. Sunscreen examples include Neutrogena, CeraVe, Blue Lizard, Elta MD and many others.    UV radiation  UVA radiation remains constant throughout the day and throughout the year. It is a longer wavelength than UVB and therefore penetrates deeper into the skin leading to immediate and delayed tanning, photoaging, and skin cancer. 70-80% of UVA and UVB radiation occurs between the hours of 10am-2pm.  UVB radiation  UVB radiation causes the most harmful effects and is more significant during the summer months. However, snow and ice can reflect UVB radiation leading to skin damage during the winter months as well. UVB radiation is  responsible for tanning, burning, inflammation, delayed erythema (pinkness), pigmentation (brown spots), and skin cancer.     I recommend self monthly full body exams and yearly full body exams with a dermatology provider. If you develop a new or changing lesion please follow up for examination. Most skin cancers are pink and scaly or pink and pearly. However, we do see blue/brown/black skin cancers.  Consider the ABCDEs of melanoma when giving yourself your monthly full body exam ( don't forget the groin, buttocks, feet, toes, etc). A-asymmetry, B-borders, C-color, D-diameter, E-elevation or evolving. If you see any of these changes please follow up in clinic. If you cannot see your back I recommend purchasing a hand held mirror to use with a larger wall mirror.                     Wound Care Instructions     FOR SUPERFICIAL WOUNDS     [facility] [phone]    [facility] [phone]          AFTER 24 HOURS YOU SHOULD REMOVE THE BANDAGE AND BEGIN DAILY DRESSING CHANGES AS FOLLOWS:     1) Remove Dressing.     2) Clean and dry the area with tap water using a Q-tip or sterile gauze pad.     3) Apply Vaseline, Aquaphor, Polysporin ointment or Bacitracin ointment over entire wound.  Do NOT use Neosporin ointment.     4) Cover the wound with a band-aid, or a sterile non-stick gauze pad and micropore paper tape      REPEAT THESE INSTRUCTIONS AT LEAST ONCE A DAY UNTIL THE WOUND HAS COMPLETELY HEALED.    It is an old wives tale that a wound heals better when it is exposed to air and allowed to dry out. The wound will heal faster with a better cosmetic result if it is kept moist with ointment and covered with a bandage.    **Do not let the wound dry out.**      Supplies Needed:      *Cotton tipped applicators (Q-tips)    *Polysporin Ointment or Bacitracin Ointment (NOT NEOSPORIN)    *Band-aids or non-stick gauze pads and micropore paper tape.      PATIENT INFORMATION:    During the healing  process you will notice a number of changes. All wounds develop a small halo of redness surrounding the wound.  This means healing is occurring. Severe itching with extensive redness usually indicates sensitivity to the ointment or bandage tape used to dress the wound.  You should call our office if this develops.      Swelling  and/or discoloration around your surgical site is common, particularly when performed around the eye.    All wounds normally drain.  The larger the wound the more drainage there will be.  After 7-10 days, you will notice the wound beginning to shrink and new skin will begin to grow.  The wound is healed when you can see skin has formed over the entire area.  A healed wound has a healthy, shiny look to the surface and is red to dark pink in color to normalize.  Wounds may take approximately 4-6 weeks to heal.  Larger wounds may take 6-8 weeks.  After the wound is healed you may discontinue dressing changes.    You may experience a sensation of tightness as your wound heals. This is normal and will gradually subside.    Your healed wound may be sensitive to temperature changes. This sensitivity improves with time, but if you re having a lot of discomfort, try to avoid temperature extremes.    Patients frequently experience itching after their wound appears to have healed because of the continue healing under the skin.  Plain Vaseline will help relieve the itching.        POSSIBLE COMPLICATIONS    BLEEDIN. Leave the bandage in place.  2. Use tightly rolled up gauze or a cloth to apply direct pressure over the bandage for 30  minutes.  3. Reapply pressure for an additional 30 minutes if necessary  4. Use additional gauze and tape to maintain pressure once the bleeding has stopped.

## 2020-12-30 NOTE — PROGRESS NOTES
HPI:  Kerri Timmons is a 60 year old female patient here today for open wound on right legPatient states this has been present for months.  Patient reports the following symptoms: open ound .  Patient reports the following previous treatments: none.  Patient reports the following modifying factors: none.  Associated symptoms: none. Also has a cold sore treating with topical denavir and one 1000mg dose of valtrex. Present x one week.  Patient has no other skin complaints today.  Remainder of the HPI, Meds, PMH, Allergies, FH, and SH was reviewed in chart.    Pertinent Hx:   No personal or family history of skin cancer. Possible that mother had skin cancer.   Past Medical History:   Diagnosis Date     ASCUS on Pap smear 11/30/2010    colp wnl     Cataract, right eye 06/17/2017     Depressive disorder      Diabetes (H)     h/o gestational     Hypertension      SVT (supraventricular tachycardia) (H) 2008       Past Surgical History:   Procedure Laterality Date     APPENDECTOMY       CATARACT IOL, RT/LT Right 08/24/2018     CHOLECYSTECTOMY       PHACOEMULSIFICATION WITH STANDARD INTRAOCULAR LENS IMPLANT Right 8/22/2018    Procedure: PHACOEMULSIFICATION WITH STANDARD INTRAOCULAR LENS IMPLANT;  Right Eye Phacoemulsification with Standard Intraocular Lens;  Surgeon: Johny Daniels MD;  Location:  OR        Family History   Problem Relation Age of Onset     C.A.D. Father         early 60's     Hypertension Father      Alzheimer Disease Brother      Hypertension Sister      Glaucoma No family hx of      Macular Degeneration No family hx of      Retinal detachment No family hx of      Amblyopia No family hx of        Social History     Socioeconomic History     Marital status:      Spouse name: Not on file     Number of children: 2     Years of education: Not on file     Highest education level: Not on file   Occupational History     Employer: [employer]   Social Needs     Financial  resource strain: Not on file     Food insecurity     Worry: Not on file     Inability: Not on file     Transportation needs     Medical: Not on file     Non-medical: Not on file   Tobacco Use     Smoking status: Light Tobacco Smoker     Types: Cigarettes     Smokeless tobacco: Never Used     Tobacco comment: 3 per week    Substance and Sexual Activity     Alcohol use: Yes     Comment: a couple drinks 3 times per week      Drug use: No     Sexual activity: Yes     Partners: Male     Birth control/protection: None   Lifestyle     Physical activity     Days per week: Not on file     Minutes per session: Not on file     Stress: Not on file   Relationships     Social connections     Talks on phone: Not on file     Gets together: Not on file     Attends Buddhism service: Not on file     Active member of club or organization: Not on file     Attends meetings of clubs or organizations: Not on file     Relationship status: Not on file     Intimate partner violence     Fear of current or ex partner: Not on file     Emotionally abused: Not on file     Physically abused: Not on file     Forced sexual activity: Not on file   Other Topics Concern     Parent/sibling w/ CABG, MI or angioplasty before 65F 55M? No   Social History Narrative    Caffeine intake/servings daily - 1    Calcium intake/servings daily - sup    Exercise 7 times weekly - describe walk    Sunscreen used - Yes    Seatbelts used - Yes    Guns stored in the home - No    Self Breast Exam - Yes and No    Pap test up to date -  Yes    Eye exam up to date -  Yes    Dental exam up to date -  Yes    DEXA scan up to date -  No    Flex Sig/Colonoscopy up to date -  Yes, 2010    Mammography up to date -  Yes    Immunizations reviewed and up to date - Yes    Abuse: Current or Past (Physical, Sexual or Emotional) - No    Do you feel safe in your environment - Yes    Do you cope well with stress - Yes    Do you suffer from insomnia - No    Last updated by: Dee Fitzpatrick Ma   1/20/2011                   Outpatient Encounter Medications as of 12/30/2020   Medication Sig Dispense Refill     calcium carbonate 500 MG tablet Take 1 tablet by mouth daily. 100 tablet 3     DULoxetine (CYMBALTA) 60 MG capsule Take 1 capsule (60 mg) by mouth daily 90 capsule 0     losartan (COZAAR) 100 MG tablet Take 1 tablet (100 mg) by mouth daily 90 tablet 0     MULTI-VITAMIN OR TABS as directed 100 0     verapamil ER (CALAN-SR) 240 MG CR tablet TAKE 1 TABLET(240 MG) BY MOUTH DAILY 90 tablet 0     diclofenac (VOLTAREN) 1 % topical gel Place 2 g onto the skin 4 times daily Place 2 g to thumbs bilaterally up to 4 times daily PRN (Patient not taking: Reported on 7/22/2020) 100 g PRN     fluocinonide (LIDEX) 0.05 % external cream Apply a thin layer to affected area BID x 2 weeks, tapering with improvement. Do not apply to face or body folds. (Patient not taking: Reported on 6/17/2019) 15 g 0     penciclovir (DENAVIR) 1 % external cream APPLY EVERY 2 HOURS AS DIRECTED 5 g PRN     valACYclovir (VALTREX) 500 MG tablet Take 1 tablet (500 mg) by mouth daily (Patient not taking: Reported on 12/30/2020) 90 tablet 0     No facility-administered encounter medications on file as of 12/30/2020.        Review Of Systems:  Skin: hsv and open wound  Eyes: negative  Ears/Nose/Throat: negative  Respiratory: No shortness of breath, dyspnea on exertion, cough, or hemoptysis  Cardiovascular: negative  Gastrointestinal: negative  Genitourinary: negative  Musculoskeletal: negative  Neurologic: negative  Psychiatric: negative  Hematologic/Lymphatic/Immunologic: negative  Endocrine: negative      Objective:     /86   Eyes: Conjunctivae/lids: Normal   ENT: Lips:  Normal  MSK: Normal  Cardiovascular: Peripheral edema none  Pulm: Breathing Normal  Neuro/Psych: Orientation: A/O x 3. Normal; Mood/Affect: Normal, NAD, WDWN  Pt accompanied by: self  Following areas examined: face, ears, right ventral elg  Soler skin type:ii    Findings:  Red crusted most plaque on left inferior cutaneous and mucosal lip  Pink macule with central excoriation on right ventral leg 0.5cm  Generalized flaking of skin of right ear    Assessment and Plan:     1) Neoplasm of uncertain behavior right ventral leg 0.5cm  PN vs DF vs SCC  TANGENTIAL BIOPSY:  After consent, anesthesia with LEC and prep, tangential biopsy performed.  No complications and routine wound care.  May grow back and will get a scar. Based on lesion type may need to completely remove lesion. Patient will be notified in 7-10 days of results. Wound care directions given.    2) xerosis  Appears to be skin build up in ears.   Continue to clean ears and moisturize daily  Pt states area was painful and inflamed a few weeks ago. Treated with topical antibiotic with resolution.  3) HSV  Restart valtrex 2000 mg BID x one day starting tonight. Repeat this at first onset  recurrent  Signs and Symptoms of non-melanoma skin cancer and ABCDEs of melanoma reviewed with patient. Patient encouraged to perform monthly self skin exams and educated on how to perform them. UV precautions reviewed with patient. Patient was asked about new or changing moles/lesions on body.   Wear a sunscreen with at least SPF 30 on your face, ears, neck and V of the chest daily. Wear sunscreen on other areas of the body if those areas are exposed to the sun throughout the day. Sunscreens can contain physical and/or chemical blockers. Physical blockers are less likely to clog pores, these include zinc oxide and titanium dioxide. Reapply every two hour and after swimming. Sunscreen examples include Neutrogena, CeraVe, Blue Lizard, Elta MD and many others.    Proper skin care from [facility]:    -Eliminate harsh soaps as they strip the natural oils from the skin, often resulting in dry itchy skin ( i.e. Dial, Zest, [place])  -Use mild soaps such as Cetaphil or Dove Sensitive Skin in the shower. You do not need to use  soap on arms, legs, and trunk every time you shower unless visibly soiled.   -Avoid hot or cold showers.  -After showering, lightly dry off and apply moisturizing within 2-3 minutes. This will help trap moisture in the skin.   -Aggressive use of a moisturizer at least 1-2 times a day to the entire body (including -Vanicream, Cetaphil, Aquaphor or Cerave) and moisturize hands after every washing.  -We recommend using moisturizers that come in a tub that needs to be scooped out, not a pump. This has more of an oil base. It will hold moisture in your skin much better than a water base moisturizer. The above recommended are non-pore clogging.               Follow up in based on biopsy results

## 2020-12-30 NOTE — LETTER
12/30/2020         RE: Kerri Timmons  [address] [place] [address]        Dear Colleague,    Thank you for referring your patient, Kerir Timmons, to the [facility]. Please see a copy of my visit note below.    HPI:  Kerri Timmons is a 60 year old female patient here today for open wound on right legPatient states this has been present for months.  Patient reports the following symptoms: open ound .  Patient reports the following previous treatments: none.  Patient reports the following modifying factors: none.  Associated symptoms: none. Also has a cold sore treating with topical denavir and one 1000mg dose of valtrex. Present x one week.  Patient has no other skin complaints today.  Remainder of the HPI, Meds, PMH, Allergies, FH, and SH was reviewed in chart.    Pertinent Hx:   No personal or family history of skin cancer. Possible that mother had skin cancer.   Past Medical History:   Diagnosis Date     ASCUS on Pap smear 11/30/2010    colp wnl     Cataract, right eye 06/17/2017     Depressive disorder      Diabetes (H)     h/o gestational     Hypertension      SVT (supraventricular tachycardia) (H) 2008       Past Surgical History:   Procedure Laterality Date     APPENDECTOMY       CATARACT IOL, RT/LT Right 08/24/2018     CHOLECYSTECTOMY       PHACOEMULSIFICATION WITH STANDARD INTRAOCULAR LENS IMPLANT Right 8/22/2018    Procedure: PHACOEMULSIFICATION WITH STANDARD INTRAOCULAR LENS IMPLANT;  Right Eye Phacoemulsification with Standard Intraocular Lens;  Surgeon: Johny Daniels MD;  Location:  OR        Family History   Problem Relation Age of Onset     C.A.D. Father         early 60's     Hypertension Father      Alzheimer Disease Brother      Hypertension Sister      Glaucoma No family hx of      Macular Degeneration No family hx of      Retinal detachment No family hx of      Amblyopia No family hx of        Social History     Socioeconomic History      Marital status:      Spouse name: Not on file     Number of children: 2     Years of education: Not on file     Highest education level: Not on file   Occupational History     Employer: [employer]   Social Needs     Financial resource strain: Not on file     Food insecurity     Worry: Not on file     Inability: Not on file     Transportation needs     Medical: Not on file     Non-medical: Not on file   Tobacco Use     Smoking status: Light Tobacco Smoker     Types: Cigarettes     Smokeless tobacco: Never Used     Tobacco comment: 3 per week    Substance and Sexual Activity     Alcohol use: Yes     Comment: a couple drinks 3 times per week      Drug use: No     Sexual activity: Yes     Partners: Male     Birth control/protection: None   Lifestyle     Physical activity     Days per week: Not on file     Minutes per session: Not on file     Stress: Not on file   Relationships     Social connections     Talks on phone: Not on file     Gets together: Not on file     Attends Anabaptist service: Not on file     Active member of club or organization: Not on file     Attends meetings of clubs or organizations: Not on file     Relationship status: Not on file     Intimate partner violence     Fear of current or ex partner: Not on file     Emotionally abused: Not on file     Physically abused: Not on file     Forced sexual activity: Not on file   Other Topics Concern     Parent/sibling w/ CABG, MI or angioplasty before 65F 55M? No   Social History Narrative    Caffeine intake/servings daily - 1    Calcium intake/servings daily - sup    Exercise 7 times weekly - describe walk    Sunscreen used - Yes    Seatbelts used - Yes    Guns stored in the home - No    Self Breast Exam - Yes and No    Pap test up to date -  Yes    Eye exam up to date -  Yes    Dental exam up to date -  Yes    DEXA scan up to date -  No    Flex Sig/Colonoscopy up to date -  Yes, 2010    Mammography up to date -  Yes     Immunizations reviewed and up to date - Yes    Abuse: Current or Past (Physical, Sexual or Emotional) - No    Do you feel safe in your environment - Yes    Do you cope well with stress - Yes    Do you suffer from insomnia - No    Last updated by: Dee Fitzpatrick Ma  1/20/2011                   Outpatient Encounter Medications as of 12/30/2020   Medication Sig Dispense Refill     calcium carbonate 500 MG tablet Take 1 tablet by mouth daily. 100 tablet 3     DULoxetine (CYMBALTA) 60 MG capsule Take 1 capsule (60 mg) by mouth daily 90 capsule 0     losartan (COZAAR) 100 MG tablet Take 1 tablet (100 mg) by mouth daily 90 tablet 0     MULTI-VITAMIN OR TABS as directed 100 0     verapamil ER (CALAN-SR) 240 MG CR tablet TAKE 1 TABLET(240 MG) BY MOUTH DAILY 90 tablet 0     diclofenac (VOLTAREN) 1 % topical gel Place 2 g onto the skin 4 times daily Place 2 g to thumbs bilaterally up to 4 times daily PRN (Patient not taking: Reported on 7/22/2020) 100 g PRN     fluocinonide (LIDEX) 0.05 % external cream Apply a thin layer to affected area BID x 2 weeks, tapering with improvement. Do not apply to face or body folds. (Patient not taking: Reported on 6/17/2019) 15 g 0     penciclovir (DENAVIR) 1 % external cream APPLY EVERY 2 HOURS AS DIRECTED 5 g PRN     valACYclovir (VALTREX) 500 MG tablet Take 1 tablet (500 mg) by mouth daily (Patient not taking: Reported on 12/30/2020) 90 tablet 0     No facility-administered encounter medications on file as of 12/30/2020.        Review Of Systems:  Skin: hsv and open wound  Eyes: negative  Ears/Nose/Throat: negative  Respiratory: No shortness of breath, dyspnea on exertion, cough, or hemoptysis  Cardiovascular: negative  Gastrointestinal: negative  Genitourinary: negative  Musculoskeletal: negative  Neurologic: negative  Psychiatric: negative  Hematologic/Lymphatic/Immunologic: negative  Endocrine: negative      Objective:     /86   Eyes: Conjunctivae/lids: Normal   ENT: Lips:  Normal  MSK:  Normal  Cardiovascular: Peripheral edema none  Pulm: Breathing Normal  Neuro/Psych: Orientation: A/O x 3. Normal; Mood/Affect: Normal, NAD, WDWN  Pt accompanied by: self  Following areas examined: face, ears, right ventral elg  Soler skin type:ii   Findings:  Red crusted most plaque on left inferior cutaneous and mucosal lip  Pink macule with central excoriation on right ventral leg 0.5cm  Generalized flaking of skin of right ear    Assessment and Plan:     1) Neoplasm of uncertain behavior right ventral leg 0.5cm  PN vs DF vs SCC  TANGENTIAL BIOPSY:  After consent, anesthesia with LEC and prep, tangential biopsy performed.  No complications and routine wound care.  May grow back and will get a scar. Based on lesion type may need to completely remove lesion. Patient will be notified in 7-10 days of results. Wound care directions given.    2) xerosis  Appears to be skin build up in ears.   Continue to clean ears and moisturize daily  Pt states area was painful and inflamed a few weeks ago. Treated with topical antibiotic with resolution.  3) HSV  Restart valtrex 2000 mg BID x one day starting tonight. Repeat this at first onset  recurrent  Signs and Symptoms of non-melanoma skin cancer and ABCDEs of melanoma reviewed with patient. Patient encouraged to perform monthly self skin exams and educated on how to perform them. UV precautions reviewed with patient. Patient was asked about new or changing moles/lesions on body.   Wear a sunscreen with at least SPF 30 on your face, ears, neck and V of the chest daily. Wear sunscreen on other areas of the body if those areas are exposed to the sun throughout the day. Sunscreens can contain physical and/or chemical blockers. Physical blockers are less likely to clog pores, these include zinc oxide and titanium dioxide. Reapply every two hour and after swimming. Sunscreen examples include Neutrogena, CeraVe, Blue Lizard, Elta MD and many others.    Proper skin care from  [facility]:    -Eliminate harsh soaps as they strip the natural oils from the skin, often resulting in dry itchy skin ( i.e. Dial, Zest, [place])  -Use mild soaps such as Cetaphil or Dove Sensitive Skin in the shower. You do not need to use soap on arms, legs, and trunk every time you shower unless visibly soiled.   -Avoid hot or cold showers.  -After showering, lightly dry off and apply moisturizing within 2-3 minutes. This will help trap moisture in the skin.   -Aggressive use of a moisturizer at least 1-2 times a day to the entire body (including -Vanicream, Cetaphil, Aquaphor or Cerave) and moisturize hands after every washing.  -We recommend using moisturizers that come in a tub that needs to be scooped out, not a pump. This has more of an oil base. It will hold moisture in your skin much better than a water base moisturizer. The above recommended are non-pore clogging.               Follow up in based on biopsy results        Again, thank you for allowing me to participate in the care of your patient.        Sincerely,        Mira Adkins PA-C

## 2021-01-04 ENCOUNTER — TRANSFERRED RECORDS (OUTPATIENT)
Dept: HEALTH INFORMATION MANAGEMENT | Facility: CLINIC | Age: 61
End: 2021-01-04

## 2021-01-06 LAB — COPATH REPORT: NORMAL

## 2021-01-15 ENCOUNTER — HEALTH MAINTENANCE LETTER (OUTPATIENT)
Age: 61
End: 2021-01-15

## 2021-01-18 ENCOUNTER — OFFICE VISIT (OUTPATIENT)
Dept: FAMILY MEDICINE | Facility: CLINIC | Age: 61
End: 2021-01-18
Payer: COMMERCIAL

## 2021-01-18 VITALS
DIASTOLIC BLOOD PRESSURE: 75 MMHG | OXYGEN SATURATION: 97 % | SYSTOLIC BLOOD PRESSURE: 117 MMHG | BODY MASS INDEX: 25.73 KG/M2 | HEIGHT: 63 IN | WEIGHT: 145.2 LBS | RESPIRATION RATE: 15 BRPM | HEART RATE: 76 BPM | TEMPERATURE: 97.4 F

## 2021-01-18 DIAGNOSIS — I47.10 PAROXYSMAL SUPRAVENTRICULAR TACHYCARDIA (H): ICD-10-CM

## 2021-01-18 DIAGNOSIS — M79.644 BILATERAL THUMB PAIN: ICD-10-CM

## 2021-01-18 DIAGNOSIS — M79.645 BILATERAL THUMB PAIN: ICD-10-CM

## 2021-01-18 DIAGNOSIS — Z01.818 PREOP GENERAL PHYSICAL EXAM: Primary | ICD-10-CM

## 2021-01-18 DIAGNOSIS — I10 HYPERTENSION GOAL BP (BLOOD PRESSURE) < 140/90: ICD-10-CM

## 2021-01-18 LAB
HGB BLD-MCNC: 13.9 G/DL (ref 11.7–15.7)
INR PPP: 0.95 (ref 0.86–1.14)

## 2021-01-18 PROCEDURE — 85018 HEMOGLOBIN: CPT | Performed by: NURSE PRACTITIONER

## 2021-01-18 PROCEDURE — 85610 PROTHROMBIN TIME: CPT | Performed by: NURSE PRACTITIONER

## 2021-01-18 PROCEDURE — 80053 COMPREHEN METABOLIC PANEL: CPT | Performed by: NURSE PRACTITIONER

## 2021-01-18 PROCEDURE — 36415 COLL VENOUS BLD VENIPUNCTURE: CPT | Performed by: NURSE PRACTITIONER

## 2021-01-18 PROCEDURE — 93000 ELECTROCARDIOGRAM COMPLETE: CPT | Performed by: NURSE PRACTITIONER

## 2021-01-18 PROCEDURE — 99214 OFFICE O/P EST MOD 30 MIN: CPT | Performed by: NURSE PRACTITIONER

## 2021-01-18 ASSESSMENT — MIFFLIN-ST. JEOR: SCORE: 1192.75

## 2021-01-18 NOTE — PROGRESS NOTES
[facility] [address] [place] [address]  Phone: [phone]  Primary Provider: Mira Draper  Pre-op Performing Provider: JEANNETTE LONDONO    PREOPERATIVE EVALUATION:  Today's date: 1/18/2021    Kerri Timmons is a 60 year old female who presents for a preoperative evaluation.    Surgical Information:  Surgery/Procedure:   Right thumb carpometacarpal joint arthoplasty   Surgery Location:  [facility]   Surgeon:  Dr. Conor Bernard  Surgery Date: 2/2/2021  Time of Surgery: TBA  Where patient plans to recover: At home with family  Fax number for surgical facility: [phone]    Type of Anesthesia Anticipated: General    Subjective     HPI related to upcoming procedure: bilateral thumb pain for years  No cartelage in MCP joint  Worsening over the last 2 years    Preop Questions 1/18/2021   1. Have you ever had a heart attack or stroke? No   2. Have you ever had surgery on your heart or blood vessels, such as a stent placement, a coronary artery bypass, or surgery on an artery in your head, neck, heart, or legs? No   3. Do you have chest pain with activity? No   4. Do you have a history of  heart failure? No   5. Do you currently have a cold, bronchitis or symptoms of other infection? No   6. Do you have a cough, shortness of breath, or wheezing? No   7. Do you or anyone in your family have previous history of blood clots? No   8. Do you or does anyone in your family have a serious bleeding problem such as prolonged bleeding following surgeries or cuts? YES - personal history of hemorrhage after a breast biopsy 2020.     9. Have you ever had problems with anemia or been told to take iron pills? No   10. Have you had any abnormal blood loss such as black, tarry or bloody stools, or abnormal vaginal bleeding? No   11. Have you ever had a blood transfusion? No   12. Are you willing to have a blood transfusion if it is medically needed before, during, or after  your surgery? Yes   13. Have you or any of your relatives ever had problems with anesthesia? No   14. Do you have sleep apnea, excessive snoring or daytime drowsiness? No   15. Do you have any artifical heart valves or other implanted medical devices like a pacemaker, defibrillator, or continuous glucose monitor? No   16. Do you have artificial joints? No   17. Are you allergic to latex? No   18. Is there any chance that you may be pregnant? -       Health Care Directive:  Patient does not have a Health Care Directive or Living Will: Discussed advance care planning with patient; however, patient declined at this time.    Preoperative Review of :   reviewed - no record of controlled substances prescribed.      Review of Systems  CONSTITUTIONAL: NEGATIVE for fever, chills, change in weight  INTEGUMENTARY/SKIN: NEGATIVE for worrisome rashes, moles or lesions  EYES: NEGATIVE for vision changes or irritation  ENT/MOUTH: NEGATIVE for ear, mouth and throat problems  RESP: NEGATIVE for significant cough or SOB  CV: NEGATIVE for chest pain, palpitations or peripheral edema  GI: NEGATIVE for nausea, abdominal pain, heartburn, or change in bowel habits  : NEGATIVE for frequency, dysuria, or hematuria  MUSCULOSKELETAL: bilateral thumb pain   NEURO: NEGATIVE for weakness, dizziness or paresthesias  ENDOCRINE: NEGATIVE for temperature intolerance, skin/hair changes  HEME: NEGATIVE for bleeding problems  PSYCHIATRIC: NEGATIVE for changes in mood or affect    Patient Active Problem List    Diagnosis Date Noted     Major depressive disorder, recurrent episode, moderate (H) 08/22/2017     Priority: Medium     Hypertension goal BP (blood pressure) < 140/90 07/19/2017     Priority: Medium     Cataract, right eye 06/17/2017     Priority: Medium     Post concussive syndrome 01/04/2012     Priority: Medium     [place] (hard of hearing) 01/04/2012     Priority: Medium     Enthesopathy 10/12/2011     Priority: Medium     Problem list  name updated by automated process. Provider to review       Health Care Home 09/07/2011     Priority: Medium     X-Contacted the Patient. Care Coordinator introduced self and the program. Patient declined to participate. Will not open to care coordination.             DX V65.8 REPLACED WITH [id number] HEALTH CARE HOME (04/08/2013)       ASCUS  Pap smear; + high risk HPV DNA 01/20/2011     Priority: Medium     11/30/10: Pap - ASCUS, + HPV 58  1/20/11: [place] - ECC - WNL. Plan pap in 6 months.  6/13/11: Pap - NIL. Plan pap in 6 months.  1/4/12: Pap - NIL.  Plan pap in 1 yr.  5/15/14: NIL pap, neg HPV, endometrial cells noted with LMP of 5/13/14. Plan pap.   7/2015: NIL pap. Plan pap in 3 years.  6/17/19 NIL pap, Neg HPV.                CARDIOVASCULAR SCREENING; LDL GOAL LESS THAN 160 10/31/2010     Priority: Medium     Cold sore 11/12/2009     Priority: Medium     Paroxysmal supraventricular tachycardia (H) 01/22/2009     Priority: Medium     Abnormal maternal glucose tolerance, antepartum 09/18/2006     Priority: Medium     Malaise and fatigue 09/18/2006     Priority: Medium     Problem list name updated by automated process. Provider to review       Disturbance in sleep behavior 09/18/2006     Priority: Medium     Problem list name updated by automated process. Provider to review        Past Medical History:   Diagnosis Date     ASCUS on Pap smear 11/30/2010    colp wnl     Cataract, right eye 06/17/2017     Depressive disorder      Diabetes (H)     h/o gestational     Hypertension      SVT (supraventricular tachycardia) (H) 2008     Past Surgical History:   Procedure Laterality Date     APPENDECTOMY       CATARACT IOL, RT/LT Right 08/24/2018     CHOLECYSTECTOMY       PHACOEMULSIFICATION WITH STANDARD INTRAOCULAR LENS IMPLANT Right 8/22/2018    Procedure: PHACOEMULSIFICATION WITH STANDARD INTRAOCULAR LENS IMPLANT;  Right Eye Phacoemulsification with Standard Intraocular Lens;  Surgeon: Johny Daniels MD;  Location:  "[facility] OR     Current Outpatient Medications   Medication Sig Dispense Refill     calcium carbonate 500 MG tablet Take 1 tablet by mouth daily. 100 tablet 3     DULoxetine (CYMBALTA) 60 MG capsule Take 1 capsule (60 mg) by mouth daily 90 capsule 0     losartan (COZAAR) 100 MG tablet Take 1 tablet (100 mg) by mouth daily 90 tablet 0     MULTI-VITAMIN OR TABS as directed 100 0     penciclovir (DENAVIR) 1 % external cream APPLY EVERY 2 HOURS AS DIRECTED 5 g PRN     verapamil ER (CALAN-SR) 240 MG CR tablet TAKE 1 TABLET(240 MG) BY MOUTH DAILY 90 tablet 0       Allergies   Allergen Reactions     No Known Drug Allergies         Social History     Tobacco Use     Smoking status: Light Tobacco Smoker     Types: Cigarettes     Smokeless tobacco: Never Used     Tobacco comment: 3 per week    Substance Use Topics     Alcohol use: Yes     Comment: a couple drinks 3 times per week        History   Drug Use No         Objective     /75 (BP Location: Left arm, Patient Position: Chair, Cuff Size: Adult Regular)   Pulse 76   Temp 97.4  F (36.3  C) (Oral)   Resp 15   Ht 1.6 m (5' 3\")   Wt 65.9 kg (145 lb 3.2 oz)   SpO2 97%   BMI 25.72 kg/m      Physical Exam    GENERAL APPEARANCE: healthy, alert and no distress     EYES: EOMI, PERRL     HENT: ear canals and TM's normal and nose and mouth without ulcers or lesions     NECK: no adenopathy, no asymmetry, masses, or scars and thyroid normal to palpation     RESP: lungs clear to auscultation - no rales, rhonchi or wheezes     CV: regular rates and rhythm, normal S1 S2, no S3 or S4 and no murmur, click or rub     ABDOMEN:  soft, nontender, no HSM or masses and bowel sounds normal     MS: bilateral thumb pain extremities normal- no gross deformities noted, no evidence of inflammation in joints, FROM in all extremities.     SKIN: no suspicious lesions or rashes     NEURO: Normal strength and tone, sensory exam grossly normal, mentation intact and speech normal     PSYCH: mentation " appears normal. and affect normal/bright     LYMPHATICS: No cervical adenopathy    Recent Labs   Lab Test 06/17/19  1007      POTASSIUM 4.2   CR 0.68        Diagnostics:  Labs pending at this time.  Results will be reviewed when available.   EKG: appears normal, NSR, normal axis, normal intervals, no acute ST/T changes c/w ischemia, no LVH by voltage criteria    Revised Cardiac Risk Index (RCRI):  The patient has the following serious cardiovascular risks for perioperative complications:   - No serious cardiac risks = 0 points     RCRI Interpretation: 0 points: Class I (very low risk - 0.4% complication rate)         Assessment & Plan   The proposed surgical procedure is considered INTERMEDIATE risk.    Preop general physical exam  Labs pending at time of preop  - INR  - **Comprehensive metabolic panel FUTURE anytime  - Hemoglobin  - EKG 12-lead complete w/read - Clinics    Hypertension goal BP (blood pressure) < 140/90  EKG NSR  - EKG 12-lead complete w/read - Clinics    Bilateral thumb pain  Cleared for surgery pending labs  - EKG 12-lead complete w/read - Clinics    Paroxysmal supraventricular tachycardia (H)    - EKG 12-lead complete w/read - Clinics         Medication Instructions:  Patient is to take all scheduled medications on the day of surgery    RECOMMENDATION:  APPROVAL GIVEN to proceed with proposed procedure, without further diagnostic evaluation.    Signed Electronically by: KELVIN Gallagher CNP    Copy of this evaluation report is provided to requesting physician.    Preop [facility] Preop Guidelines    Revised Cardiac Risk Index

## 2021-01-18 NOTE — PATIENT INSTRUCTIONS

## 2021-01-19 LAB
ALBUMIN SERPL-MCNC: 4 G/DL (ref 3.4–5)
ALP SERPL-CCNC: 98 U/L (ref 40–150)
ALT SERPL W P-5'-P-CCNC: 31 U/L (ref 0–50)
ANION GAP SERPL CALCULATED.3IONS-SCNC: 7 MMOL/L (ref 3–14)
AST SERPL W P-5'-P-CCNC: 18 U/L (ref 0–45)
BILIRUB SERPL-MCNC: 0.8 MG/DL (ref 0.2–1.3)
BUN SERPL-MCNC: 15 MG/DL (ref 7–30)
CALCIUM SERPL-MCNC: 9.4 MG/DL (ref 8.5–10.1)
CHLORIDE SERPL-SCNC: 104 MMOL/L (ref 94–109)
CO2 SERPL-SCNC: 24 MMOL/L (ref 20–32)
CREAT SERPL-MCNC: 0.56 MG/DL (ref 0.52–1.04)
GFR SERPL CREATININE-BSD FRML MDRD: >90 ML/MIN/{1.73_M2}
GLUCOSE SERPL-MCNC: 110 MG/DL (ref 70–99)
POTASSIUM SERPL-SCNC: 3.9 MMOL/L (ref 3.4–5.3)
PROT SERPL-MCNC: 7.8 G/DL (ref 6.8–8.8)
SODIUM SERPL-SCNC: 135 MMOL/L (ref 133–144)

## 2021-02-15 ENCOUNTER — TRANSFERRED RECORDS (OUTPATIENT)
Dept: HEALTH INFORMATION MANAGEMENT | Facility: CLINIC | Age: 61
End: 2021-02-15

## 2021-04-26 ENCOUNTER — OFFICE VISIT (OUTPATIENT)
Dept: FAMILY MEDICINE | Facility: CLINIC | Age: 61
End: 2021-04-26
Payer: COMMERCIAL

## 2021-04-26 VITALS
WEIGHT: 147 LBS | DIASTOLIC BLOOD PRESSURE: 70 MMHG | RESPIRATION RATE: 14 BRPM | HEIGHT: 63 IN | BODY MASS INDEX: 26.05 KG/M2 | HEART RATE: 94 BPM | TEMPERATURE: 97.7 F | OXYGEN SATURATION: 99 % | SYSTOLIC BLOOD PRESSURE: 116 MMHG

## 2021-04-26 DIAGNOSIS — M79.644 BILATERAL THUMB PAIN: ICD-10-CM

## 2021-04-26 DIAGNOSIS — M79.645 BILATERAL THUMB PAIN: ICD-10-CM

## 2021-04-26 DIAGNOSIS — Z01.818 PREOP GENERAL PHYSICAL EXAM: Primary | ICD-10-CM

## 2021-04-26 DIAGNOSIS — I10 HYPERTENSION GOAL BP (BLOOD PRESSURE) < 140/90: ICD-10-CM

## 2021-04-26 LAB — HGB BLD-MCNC: 13.7 G/DL (ref 11.7–15.7)

## 2021-04-26 PROCEDURE — 36415 COLL VENOUS BLD VENIPUNCTURE: CPT | Performed by: NURSE PRACTITIONER

## 2021-04-26 PROCEDURE — 85018 HEMOGLOBIN: CPT | Performed by: NURSE PRACTITIONER

## 2021-04-26 PROCEDURE — 99214 OFFICE O/P EST MOD 30 MIN: CPT | Performed by: NURSE PRACTITIONER

## 2021-04-26 ASSESSMENT — MIFFLIN-ST. JEOR: SCORE: 1200.92

## 2021-04-26 NOTE — PROGRESS NOTES
[facility] [address] [place] [address]  Phone: [phone]  Primary Provider: Mira Draper  Pre-op Performing Provider: JEANNETTE LONDONO    PREOPERATIVE EVALUATION:  Today's date: 4/26/2021    Kerri Timmons is a 60 year old female who presents for a preoperative evaluation.    Surgical Information:  Surgery/Procedure: Left thumb and ligament reconstruction Tendon  Surgery Location: [facility]  Surgeon: Dr.Adam Bernard  Surgery Date: 5/4/2021  Time of Surgery: TB  Where patient plans to recover: At home with family  Fax number for surgical facility: [phone]    Type of Anesthesia Anticipated: to be determined    Assessment & Plan     The proposed surgical procedure is considered INTERMEDIATE risk.    Preop general physical exam    - Hemoglobin    Bilateral thumb pain    - Hemoglobin    Hypertension goal BP (blood pressure) < 140/90  Due for future labs, not fasting today  - Lipid panel reflex to direct LDL Fasting; Future  - Comprehensive metabolic panel; Future  - Albumin Random Urine Quantitative with Creat Ratio; Future         Risks and Recommendations:  The patient has the following additional risks and recommendations for perioperative complications:   - No identified additional risk factors other than previously addressed    Medication Instructions:   - ACE/ARB: May be continued on the day of surgery.    - SSRIs, SNRIs, TCAs, Antipsychotics: Continue without modification.     RECOMMENDATION:  APPROVAL GIVEN to proceed with proposed procedure, without further diagnostic evaluation.                Subjective     HPI related to upcoming procedure: bilateral thumb pains for over 3 years and failed cortisone injections and conservative treatments.        Preop Questions 4/26/2021   1. Have you ever had a heart attack or stroke? No   2. Have you ever had surgery on your heart or blood vessels, such as a stent placement, a coronary artery  bypass, or surgery on an artery in your head, neck, heart, or legs? No   3. Do you have chest pain with activity? No   4. Do you have a history of  heart failure? No   5. Do you currently have a cold, bronchitis or symptoms of other infection? No   6. Do you have a cough, shortness of breath, or wheezing? No   7. Do you or anyone in your family have previous history of blood clots? No   8. Do you or does anyone in your family have a serious bleeding problem such as prolonged bleeding following surgeries or cuts? No   9. Have you ever had problems with anemia or been told to take iron pills? No   10. Have you had any abnormal blood loss such as black, tarry or bloody stools, or abnormal vaginal bleeding? No   11. Have you ever had a blood transfusion? No   12. Are you willing to have a blood transfusion if it is medically needed before, during, or after your surgery? Yes   13. Have you or any of your relatives ever had problems with anesthesia? No   14. Do you have sleep apnea, excessive snoring or daytime drowsiness? No   15. Do you have any artifical heart valves or other implanted medical devices like a pacemaker, defibrillator, or continuous glucose monitor? No   16. Do you have artificial joints? No   17. Are you allergic to latex? No   18. Is there any chance that you may be pregnant? -     Health Care Directive:  Patient does not have a Health Care Directive or Living Will: Discussed advance care planning with patient; however, patient declined at this time.    Preoperative Review of :   reviewed - no record of controlled substances prescribed.    Status of Chronic Conditions:  See problem list for active medical problems.  Problems all longstanding and stable, except as noted/documented.  See ROS for pertinent symptoms related to these conditions.      Review of Systems  Constitutional, neuro, ENT, endocrine, pulmonary, cardiac, gastrointestinal, genitourinary, musculoskeletal, integument and  psychiatric systems are negative, except as otherwise noted.    Patient Active Problem List    Diagnosis Date Noted     Major depressive disorder, recurrent episode, moderate (H) 08/22/2017     Priority: Medium     Hypertension goal BP (blood pressure) < 140/90 07/19/2017     Priority: Medium     Cataract, right eye 06/17/2017     Priority: Medium     Post concussive syndrome 01/04/2012     Priority: Medium     [place] (hard of hearing) 01/04/2012     Priority: Medium     Enthesopathy 10/12/2011     Priority: Medium     Problem list name updated by automated process. Provider to review       Health Care Home 09/07/2011     Priority: Medium     X-Contacted the Patient. Care Coordinator introduced self and the program. Patient declined to participate. Will not open to care coordination.             DX V65.8 REPLACED WITH [id number] HEALTH CARE HOME (04/08/2013)       ASCUS  Pap smear; + high risk HPV DNA 01/20/2011     Priority: Medium     11/30/10: Pap - ASCUS, + HPV 57  1/20/11: [place] - ECC - WNL. Plan pap in 6 months.  6/13/11: Pap - NIL. Plan pap in 6 months.  1/4/12: Pap - NIL.  Plan pap in 1 yr.  5/15/14: NIL pap, neg HPV, endometrial cells noted with LMP of 5/13/14. Plan pap.   7/2015: NIL pap. Plan pap in 3 years.  6/17/19 NIL pap, Neg HPV.                CARDIOVASCULAR SCREENING; LDL GOAL LESS THAN 160 10/31/2010     Priority: Medium     Cold sore 11/12/2009     Priority: Medium     Paroxysmal supraventricular tachycardia (H) 01/22/2009     Priority: Medium     Abnormal maternal glucose tolerance, antepartum 09/18/2006     Priority: Medium     Malaise and fatigue 09/18/2006     Priority: Medium     Problem list name updated by automated process. Provider to review       Disturbance in sleep behavior 09/18/2006     Priority: Medium     Problem list name updated by automated process. Provider to review        Past Medical History:   Diagnosis Date     ASCUS on Pap smear 11/30/2010    colp wnl     Cataract, right eye  "06/17/2017     Depressive disorder      Diabetes (H)     h/o gestational     Hypertension      SVT (supraventricular tachycardia) (H) 2008     Past Surgical History:   Procedure Laterality Date     APPENDECTOMY       CATARACT IOL, RT/LT Right 08/24/2018     CHOLECYSTECTOMY       PHACOEMULSIFICATION WITH STANDARD INTRAOCULAR LENS IMPLANT Right 8/22/2018    Procedure: PHACOEMULSIFICATION WITH STANDARD INTRAOCULAR LENS IMPLANT;  Right Eye Phacoemulsification with Standard Intraocular Lens;  Surgeon: Johny Daniels MD;  Location:  OR     Current Outpatient Medications   Medication Sig Dispense Refill     calcium carbonate 500 MG tablet Take 1 tablet by mouth daily. 100 tablet 3     DULoxetine (CYMBALTA) 60 MG capsule Take 1 capsule (60 mg) by mouth daily 90 capsule 0     losartan (COZAAR) 100 MG tablet Take 1 tablet (100 mg) by mouth daily 90 tablet 0     MULTI-VITAMIN OR TABS as directed 100 0     penciclovir (DENAVIR) 1 % external cream APPLY EVERY 2 HOURS AS DIRECTED 5 g PRN     verapamil ER (CALAN-SR) 240 MG CR tablet TAKE 1 TABLET(240 MG) BY MOUTH DAILY 90 tablet 0       Allergies   Allergen Reactions     No Known Drug Allergies         Social History     Tobacco Use     Smoking status: Light Tobacco Smoker     Types: Cigarettes     Smokeless tobacco: Never Used     Tobacco comment: 3 per week    Substance Use Topics     Alcohol use: Yes     Comment: a couple drinks 3 times per week        History   Drug Use No         Objective     /70 (BP Location: Left arm, Patient Position: Chair, Cuff Size: Adult Regular)   Pulse 94   Temp 97.7  F (36.5  C) (Tympanic)   Resp 14   Ht 1.6 m (5' 3\")   Wt 66.7 kg (147 lb)   SpO2 99%   BMI 26.04 kg/m      Physical Exam    GENERAL APPEARANCE: healthy, alert and no distress     EYES: EOMI, PERRL     HENT: ear canals and TM's normal and nose and mouth without ulcers or lesions     NECK: no adenopathy, no asymmetry, masses, or scars and thyroid normal to " palpation     RESP: lungs clear to auscultation - no rales, rhonchi or wheezes     CV: regular rates and rhythm, normal S1 S2, no S3 or S4 and no murmur, click or rub     ABDOMEN:  soft, nontender, no HSM or masses and bowel sounds normal     MS: extremities normal- no gross deformities noted, no evidence of inflammation in joints, FROM in all extremities.     SKIN: no suspicious lesions or rashes     NEURO: Normal strength and tone, sensory exam grossly normal, mentation intact and speech normal     PSYCH: mentation appears normal. and affect normal/bright     LYMPHATICS: No cervical adenopathy    Recent Labs   Lab Test 01/18/21  1438 06/17/19  1007   HGB 13.9  --    INR 0.95  --     140   POTASSIUM 3.9 4.2   CR 0.56 0.68        Diagnostics:  Labs pending at this time.  Results will be reviewed when available.   No EKG required for low risk surgery (cataract, skin procedure, breast biopsy, etc).    Revised Cardiac Risk Index (RCRI):  The patient has the following serious cardiovascular risks for perioperative complications:   - No serious cardiac risks = 0 points     RCRI Interpretation: 0 points: Class I (very low risk - 0.4% complication rate)           Signed Electronically by: KELVIN Gallagher CNP  Copy of this evaluation report is provided to requesting physician.

## 2021-04-26 NOTE — PATIENT INSTRUCTIONS

## 2021-04-27 DIAGNOSIS — I10 HYPERTENSION GOAL BP (BLOOD PRESSURE) < 140/90: ICD-10-CM

## 2021-04-27 LAB
ALBUMIN SERPL-MCNC: 3.9 G/DL (ref 3.4–5)
ALP SERPL-CCNC: 104 U/L (ref 40–150)
ALT SERPL W P-5'-P-CCNC: 37 U/L (ref 0–50)
ANION GAP SERPL CALCULATED.3IONS-SCNC: 10 MMOL/L (ref 3–14)
AST SERPL W P-5'-P-CCNC: 25 U/L (ref 0–45)
BILIRUB SERPL-MCNC: 0.6 MG/DL (ref 0.2–1.3)
BUN SERPL-MCNC: 17 MG/DL (ref 7–30)
CALCIUM SERPL-MCNC: 9.2 MG/DL (ref 8.5–10.1)
CHLORIDE SERPL-SCNC: 103 MMOL/L (ref 94–109)
CHOLEST SERPL-MCNC: 223 MG/DL
CO2 SERPL-SCNC: 25 MMOL/L (ref 20–32)
CREAT SERPL-MCNC: 0.57 MG/DL (ref 0.52–1.04)
CREAT UR-MCNC: 250 MG/DL
GFR SERPL CREATININE-BSD FRML MDRD: >90 ML/MIN/{1.73_M2}
GLUCOSE SERPL-MCNC: 131 MG/DL (ref 70–99)
HDLC SERPL-MCNC: 66 MG/DL
LDLC SERPL CALC-MCNC: 79 MG/DL
MICROALBUMIN UR-MCNC: 16 MG/L
MICROALBUMIN/CREAT UR: 6.4 MG/G CR (ref 0–25)
NONHDLC SERPL-MCNC: 157 MG/DL
POTASSIUM SERPL-SCNC: 4.2 MMOL/L (ref 3.4–5.3)
PROT SERPL-MCNC: 7.5 G/DL (ref 6.8–8.8)
SODIUM SERPL-SCNC: 138 MMOL/L (ref 133–144)
TRIGL SERPL-MCNC: 392 MG/DL

## 2021-04-27 PROCEDURE — 82043 UR ALBUMIN QUANTITATIVE: CPT | Performed by: NURSE PRACTITIONER

## 2021-04-27 PROCEDURE — 80053 COMPREHEN METABOLIC PANEL: CPT | Performed by: NURSE PRACTITIONER

## 2021-04-27 PROCEDURE — 36415 COLL VENOUS BLD VENIPUNCTURE: CPT | Performed by: NURSE PRACTITIONER

## 2021-04-27 PROCEDURE — 80061 LIPID PANEL: CPT | Performed by: NURSE PRACTITIONER

## 2021-05-24 DIAGNOSIS — F33.1 MAJOR DEPRESSIVE DISORDER, RECURRENT EPISODE, MODERATE (H): ICD-10-CM

## 2021-05-26 RX ORDER — DULOXETIN HYDROCHLORIDE 60 MG/1
CAPSULE, DELAYED RELEASE ORAL
Qty: 90 CAPSULE | Refills: 0 | Status: SHIPPED | OUTPATIENT
Start: 2021-05-26 | End: 2021-09-17

## 2021-06-16 ENCOUNTER — TRANSFERRED RECORDS (OUTPATIENT)
Dept: HEALTH INFORMATION MANAGEMENT | Facility: CLINIC | Age: 61
End: 2021-06-16

## 2021-06-25 ENCOUNTER — TELEPHONE (OUTPATIENT)
Dept: GASTROENTEROLOGY | Facility: CLINIC | Age: 61
End: 2021-06-25

## 2021-06-25 DIAGNOSIS — Z11.59 ENCOUNTER FOR SCREENING FOR OTHER VIRAL DISEASES: ICD-10-CM

## 2021-06-25 NOTE — TELEPHONE ENCOUNTER
Screening Questions  1. Are you active on mychart? YES    2. What insurance is in the chart? [employer]     2.  Ordering/Referring Provider: Mira Draper NP    3. BMI 24.9    4. Are you on daily home oxygen? N    5. Do you have a history of difficult airway? N    6. Have you had a heart, lung, or liver transplant? N    7. Are you currently on dialysis? N    8. Have you had a stroke or Transient ischemic atttack (TIA) within 6 months? N    9. In the past 6 months, have you had any heart related issues including cardiomyopathy or heart attack?         If yes, did it require cardiac stenting or other implantable device?N    10. Do you have any implantable devices in your body (pacemaker, defib, LVAD)? N    11. Do you take nitroglycerin? If yes, how often? N    12. Are you currently taking any blood thinners?N    13. Are you a diabetic? N    14. (Females) Are you currently pregnant?   If yes, how many weeks?    15. Have you had a procedure in the past that was difficult to tolerate with conscious sedation? Any allergies to Fentanyl or Versed N    16. Are you taking any scheduled prescription narcotics more than once daily? N    17. Do you have any chemical dependencies such as alcohol, street drugs, or methadone? N    18. Do you have any history of post-traumatic stress syndrome or mental health issues? N    19. Do you transfer independently? Y    20.  Do you have any issues with constipation? N    21. Preferred Pharmacy for Pre Prescription [facility] PREFERR[facility]     Scheduling Details    Procedure Scheduled: Colonoscopy   Provider/Surgeon: Dougie  Date of Procedure: 07/29/2021  Location:  GI   Caller (Please ask for phone number if not scheduled by patient): Kerri Timmons/ Self       Sedation Type: CS   Conscious Sedation- Needs  for 6 hours after the procedure  MAC/General-Needs  for 24 hours after procedure    Pre-op Required at [facility] [place], and OR for MAC sedation:   (if yes  advise patient they will need a pre-op prior to procedure)      Is patient on blood thinners? -N (If yes- inform patient to follow up with PCP or provider for follow up instructions)     Informed patient they will need an adult    Y    Cannot take any type of public or medical transportation alone    Informed Patient of COVID Test Requirement Y    Confirmed Nurse will call to complete assessment Provided RN contact     Additional comments: N

## 2021-07-20 ENCOUNTER — ANCILLARY PROCEDURE (OUTPATIENT)
Dept: MAMMOGRAPHY | Facility: CLINIC | Age: 61
End: 2021-07-20
Attending: NURSE PRACTITIONER
Payer: COMMERCIAL

## 2021-07-20 DIAGNOSIS — Z12.31 VISIT FOR SCREENING MAMMOGRAM: ICD-10-CM

## 2021-07-20 PROCEDURE — 77063 BREAST TOMOSYNTHESIS BI: CPT | Mod: GC | Performed by: STUDENT IN AN ORGANIZED HEALTH CARE EDUCATION/TRAINING PROGRAM

## 2021-07-20 PROCEDURE — 77067 SCR MAMMO BI INCL CAD: CPT | Mod: GC | Performed by: STUDENT IN AN ORGANIZED HEALTH CARE EDUCATION/TRAINING PROGRAM

## 2021-07-26 ENCOUNTER — LAB (OUTPATIENT)
Dept: LAB | Facility: CLINIC | Age: 61
End: 2021-07-26
Payer: COMMERCIAL

## 2021-07-26 DIAGNOSIS — Z11.59 ENCOUNTER FOR SCREENING FOR OTHER VIRAL DISEASES: ICD-10-CM

## 2021-07-26 LAB — SARS-COV-2 RNA RESP QL NAA+PROBE: NEGATIVE

## 2021-07-26 PROCEDURE — U0003 INFECTIOUS AGENT DETECTION BY NUCLEIC ACID (DNA OR RNA); SEVERE ACUTE RESPIRATORY SYNDROME CORONAVIRUS 2 (SARS-COV-2) (CORONAVIRUS DISEASE [COVID-19]), AMPLIFIED PROBE TECHNIQUE, MAKING USE OF HIGH THROUGHPUT TECHNOLOGIES AS DESCRIBED BY CMS-2020-01-R: HCPCS

## 2021-07-26 PROCEDURE — U0005 INFEC AGEN DETEC AMPLI PROBE: HCPCS

## 2021-07-29 ENCOUNTER — HOSPITAL ENCOUNTER (OUTPATIENT)
Facility: CLINIC | Age: 61
Discharge: HOME | End: 2021-07-29
Attending: SPECIALIST | Admitting: SPECIALIST
Payer: COMMERCIAL

## 2021-07-29 VITALS
RESPIRATION RATE: 19 BRPM | WEIGHT: 138 LBS | DIASTOLIC BLOOD PRESSURE: 82 MMHG | SYSTOLIC BLOOD PRESSURE: 109 MMHG | TEMPERATURE: 97 F | OXYGEN SATURATION: 94 % | BODY MASS INDEX: 24.45 KG/M2 | HEIGHT: 63 IN | HEART RATE: 66 BPM

## 2021-07-29 LAB — COLONOSCOPY: NORMAL

## 2021-07-29 PROCEDURE — G0500 MOD SEDAT ENDO SERVICE >5YRS: HCPCS | Performed by: SPECIALIST

## 2021-07-29 PROCEDURE — 88305 TISSUE EXAM BY PATHOLOGIST: CPT | Mod: TC | Performed by: SPECIALIST

## 2021-07-29 PROCEDURE — 99153 MOD SED SAME PHYS/QHP EA: CPT | Performed by: SPECIALIST

## 2021-07-29 PROCEDURE — 45380 COLONOSCOPY AND BIOPSY: CPT | Mod: PT | Performed by: SPECIALIST

## 2021-07-29 PROCEDURE — 250N000011 HC RX IP 250 OP 636: Performed by: SPECIALIST

## 2021-07-29 RX ORDER — NALOXONE HYDROCHLORIDE 0.4 MG/ML
0.2 INJECTION, SOLUTION INTRAMUSCULAR; INTRAVENOUS; SUBCUTANEOUS
Status: DISCONTINUED | OUTPATIENT
Start: 2021-07-29 | End: 2021-07-29 | Stop reason: HOSPADM

## 2021-07-29 RX ORDER — PROCHLORPERAZINE MALEATE 10 MG
10 TABLET ORAL EVERY 6 HOURS PRN
Status: DISCONTINUED | OUTPATIENT
Start: 2021-07-29 | End: 2021-07-29 | Stop reason: HOSPADM

## 2021-07-29 RX ORDER — ONDANSETRON 4 MG/1
4 TABLET, ORALLY DISINTEGRATING ORAL EVERY 6 HOURS PRN
Status: DISCONTINUED | OUTPATIENT
Start: 2021-07-29 | End: 2021-07-29 | Stop reason: HOSPADM

## 2021-07-29 RX ORDER — NALOXONE HYDROCHLORIDE 0.4 MG/ML
0.4 INJECTION, SOLUTION INTRAMUSCULAR; INTRAVENOUS; SUBCUTANEOUS
Status: DISCONTINUED | OUTPATIENT
Start: 2021-07-29 | End: 2021-07-29 | Stop reason: HOSPADM

## 2021-07-29 RX ORDER — LIDOCAINE 40 MG/G
CREAM TOPICAL
Status: DISCONTINUED | OUTPATIENT
Start: 2021-07-29 | End: 2021-07-29 | Stop reason: HOSPADM

## 2021-07-29 RX ORDER — FLUMAZENIL 0.1 MG/ML
0.2 INJECTION, SOLUTION INTRAVENOUS
Status: DISCONTINUED | OUTPATIENT
Start: 2021-07-29 | End: 2021-07-29 | Stop reason: HOSPADM

## 2021-07-29 RX ORDER — ONDANSETRON 2 MG/ML
4 INJECTION INTRAMUSCULAR; INTRAVENOUS
Status: DISCONTINUED | OUTPATIENT
Start: 2021-07-29 | End: 2021-07-29 | Stop reason: HOSPADM

## 2021-07-29 RX ORDER — ONDANSETRON 2 MG/ML
4 INJECTION INTRAMUSCULAR; INTRAVENOUS EVERY 6 HOURS PRN
Status: DISCONTINUED | OUTPATIENT
Start: 2021-07-29 | End: 2021-07-29 | Stop reason: HOSPADM

## 2021-07-29 RX ORDER — FENTANYL CITRATE 50 UG/ML
INJECTION, SOLUTION INTRAMUSCULAR; INTRAVENOUS PRN
Status: COMPLETED | OUTPATIENT
Start: 2021-07-29 | End: 2021-07-29

## 2021-07-29 RX ADMIN — MIDAZOLAM 1 MG: 1 INJECTION INTRAMUSCULAR; INTRAVENOUS at 07:41

## 2021-07-29 RX ADMIN — MIDAZOLAM 1 MG: 1 INJECTION INTRAMUSCULAR; INTRAVENOUS at 07:36

## 2021-07-29 RX ADMIN — FENTANYL CITRATE 50 MCG: 50 INJECTION, SOLUTION INTRAMUSCULAR; INTRAVENOUS at 07:35

## 2021-07-29 RX ADMIN — FENTANYL CITRATE 50 MCG: 50 INJECTION, SOLUTION INTRAMUSCULAR; INTRAVENOUS at 07:42

## 2021-07-29 ASSESSMENT — MIFFLIN-ST. JEOR: SCORE: 1160.09

## 2021-07-29 NOTE — H&P
Pre-Endoscopy History and Physical     Kerri Timmons MRN# [id number]   YOB: 1960 Age: 60 year old     Date of Procedure: 7/29/2021  Primary care provider: Mira Draper  Type of Endoscopy: Colonoscopy with possible biopsy, possible polypectomy  Reason for Procedure: history of polyps, diarrhea  Type of Anesthesia Anticipated: Conscious Sedation    HPI:    Kerri is a 60 year old female who will be undergoing the above procedure.      A history and physical has been performed. The patient's medications and allergies have been reviewed. The risks and benefits of the procedure and the sedation options and risks were discussed with the patient.  All questions were answered and informed consent was obtained.      She denies a personal or family history of anesthesia complications or bleeding disorders.     Patient Active Problem List   Diagnosis     Abnormal maternal glucose tolerance, antepartum     Malaise and fatigue     Disturbance in sleep behavior     Paroxysmal supraventricular tachycardia (H)     Cold sore     CARDIOVASCULAR SCREENING; LDL GOAL LESS THAN 160     ASCUS  Pap smear; + high risk HPV DNA     Health Care Home     Enthesopathy     Post concussive syndrome     [place] (hard of hearing)     Cataract, right eye     Hypertension goal BP (blood pressure) < 140/90     Major depressive disorder, recurrent episode, moderate (H)        Past Medical History:   Diagnosis Date     ASCUS on Pap smear 11/30/2010    colp wnl     Cataract, right eye 06/17/2017     Depressive disorder      Diabetes (H)     h/o gestational     Hypertension      SVT (supraventricular tachycardia) (H) 2008        Past Surgical History:   Procedure Laterality Date     APPENDECTOMY       CATARACT IOL, RT/LT Right 08/24/2018     CHOLECYSTECTOMY       PHACOEMULSIFICATION WITH STANDARD INTRAOCULAR LENS IMPLANT Right 8/22/2018    Procedure: PHACOEMULSIFICATION WITH STANDARD INTRAOCULAR LENS IMPLANT;  Right Eye Phacoemulsification  "with Standard Intraocular Lens;  Surgeon: Johny Daniels MD;  Location:  OR       Social History     Tobacco Use     Smoking status: Light Tobacco Smoker     Types: Cigarettes     Smokeless tobacco: Never Used     Tobacco comment: 3 per week    Substance Use Topics     Alcohol use: Yes     Comment: a couple drinks 3 times per week        Family History   Problem Relation Age of Onset     C.A.D. Father         early 60's     Hypertension Father      Alzheimer Disease Brother      Hypertension Sister      Glaucoma No family hx of      Macular Degeneration No family hx of      Retinal detachment No family hx of      Amblyopia No family hx of        Prior to Admission medications    Medication Sig Start Date End Date Taking? Authorizing Provider   DULoxetine (CYMBALTA) 60 MG capsule TAKE 1 CAPSULE(60 MG) BY MOUTH DAILY 5/26/21  Yes Mira Draper NP   losartan (COZAAR) 100 MG tablet TAKE 1 TABLET(100 MG) BY MOUTH DAILY 5/10/21  Yes Mira Draper NP   MULTI-VITAMIN OR TABS as directed 8/31/04  Yes John Patton MD   verapamil ER (CALAN-SR) 240 MG CR tablet TAKE 1 TABLET(240 MG) BY MOUTH DAILY 5/10/21  Yes Mira Draper NP   calcium carbonate 500 MG tablet Take 1 tablet by mouth daily. 10/12/11   Johny Redmond DPM   penciclovir (DENAVIR) 1 % external cream APPLY EVERY 2 HOURS AS DIRECTED 10/29/20   Mira Draper NP       Allergies   Allergen Reactions     No Known Drug Allergies         REVIEW OF SYSTEMS:   5 point ROS negative except as noted above in HPI, including Gen., Resp., CV, GI &  system review.    PHYSICAL EXAM:   BP (!) 127/92   Pulse 70   Temp 97  F (36.1  C) (Skin)   Resp 20   Ht 1.6 m (5' 3\")   Wt 62.6 kg (138 lb)   SpO2 99%   BMI 24.45 kg/m   Estimated body mass index is 24.45 kg/m  as calculated from the following:    Height as of this encounter: 1.6 m (5' 3\").    Weight as of this encounter: 62.6 kg (138 lb).   GENERAL APPEARANCE: alert, and " oriented  MENTAL STATUS: alert  AIRWAY EXAM: Mallampatti Class II (visualization of the soft palate, fauces, and uvula)  RESP: lungs clear to auscultation - no rales, rhonchi or wheezes  CV: regular rates and rhythm  DIAGNOSTICS:    Not indicated    IMPRESSION   ASA Class 2 - Mild systemic disease    PLAN:   Plan for Colonoscopy with possible biopsy, possible polypectomy. We discussed the risks, benefits and alternatives and the patient wished to proceed.    The above has been forwarded to the consulting provider.      Signed Electronically by: Po Constantino MD  July 29, 2021

## 2021-07-30 LAB
PATH REPORT.COMMENTS IMP SPEC: NORMAL
PATH REPORT.COMMENTS IMP SPEC: NORMAL
PATH REPORT.FINAL DX SPEC: NORMAL
PATH REPORT.GROSS SPEC: NORMAL
PATH REPORT.MICROSCOPIC SPEC OTHER STN: NORMAL
PATH REPORT.RELEVANT HX SPEC: NORMAL
PHOTO IMAGE: NORMAL

## 2021-07-30 PROCEDURE — 88305 TISSUE EXAM BY PATHOLOGIST: CPT | Mod: 26 | Performed by: PATHOLOGY

## 2021-08-02 ENCOUNTER — MYC MEDICAL ADVICE (OUTPATIENT)
Dept: FAMILY MEDICINE | Facility: CLINIC | Age: 61
End: 2021-08-02

## 2021-08-02 ENCOUNTER — TRANSFERRED RECORDS (OUTPATIENT)
Dept: HEALTH INFORMATION MANAGEMENT | Facility: CLINIC | Age: 61
End: 2021-08-02

## 2021-08-02 DIAGNOSIS — R19.7 DIARRHEA, UNSPECIFIED TYPE: Primary | ICD-10-CM

## 2021-08-02 NOTE — RESULT ENCOUNTER NOTE
Assessment: The colonic mucosa was normal; there is no evidence of microscopic colitis. Kerri mentioned the diarrhea started after having her gallbladder removed years ago.     Recommendations: Screening colonoscopy in 10 years (2031).  Consider a therapeutic trial of cholestyramine; start with 2 gms in 4 oz of water daily. Titrate dose (to 4 to 8 gms) and frequency (to twice daily) if necessary.

## 2021-08-03 RX ORDER — CHOLESTYRAMINE 4 G/9G
POWDER, FOR SUSPENSION ORAL
Qty: 378 G | Refills: 3 | Status: SHIPPED | OUTPATIENT
Start: 2021-08-03 | End: 2023-09-13

## 2021-08-03 NOTE — TELEPHONE ENCOUNTER
"Jasmine-Please advise if you recommend patient schedule visit/eVisit to address starting medication suggested by GI?    Per documentation from Dr. Constantino:  \"Assessment: The colonic mucosa was normal; there is no evidence of microscopic colitis. Kerri mentioned the diarrhea started after having her gallbladder removed years ago.     Recommendations: Screening colonoscopy in 10 years (2031).  Consider a therapeutic trial of cholestyramine; start with 2 gms in 4 oz of water daily. Titrate dose (to 4 to 8 gms) and frequency (to twice daily) if necessary.\"    Thank you!  FIDE UriarteN, RN  [facility]    "

## 2021-08-03 NOTE — TELEPHONE ENCOUNTER
Writer responded via tradeNOW.    FIDE UriarteN, RN  [facility]

## 2021-09-04 ENCOUNTER — HEALTH MAINTENANCE LETTER (OUTPATIENT)
Age: 61
End: 2021-09-04

## 2021-09-13 ENCOUNTER — TRANSFERRED RECORDS (OUTPATIENT)
Dept: HEALTH INFORMATION MANAGEMENT | Facility: CLINIC | Age: 61
End: 2021-09-13

## 2021-09-15 DIAGNOSIS — F33.1 MAJOR DEPRESSIVE DISORDER, RECURRENT EPISODE, MODERATE (H): ICD-10-CM

## 2021-09-17 RX ORDER — DULOXETIN HYDROCHLORIDE 60 MG/1
CAPSULE, DELAYED RELEASE ORAL
Qty: 90 CAPSULE | Refills: 0 | Status: SHIPPED | OUTPATIENT
Start: 2021-09-17 | End: 2022-02-04

## 2021-09-17 NOTE — TELEPHONE ENCOUNTER
Serotonin-Norepinephrine Reuptake Inhibitors Emmwtd32/15/2021 05:20 PM   Blood pressure under 140/90 in past 12 months Protocol Details    PHQ-9 score of less than 5 in past 6 months     Medication is active on med list     Patient is age 18 or older     No active pregnancy on record     No positive pregnancy test in past 12 months     Recent (6 mo) or future (30 days) visit within the authorizing provider's specialty

## 2021-09-20 ENCOUNTER — OFFICE VISIT (OUTPATIENT)
Dept: OPHTHALMOLOGY | Facility: CLINIC | Age: 61
End: 2021-09-20
Attending: OPHTHALMOLOGY
Payer: COMMERCIAL

## 2021-09-20 DIAGNOSIS — Z96.1 PSEUDOPHAKIA OF RIGHT EYE: Primary | ICD-10-CM

## 2021-09-20 DIAGNOSIS — H25.12 NUCLEAR SENILE CATARACT OF LEFT EYE: ICD-10-CM

## 2021-09-20 PROCEDURE — 92014 COMPRE OPH EXAM EST PT 1/>: CPT | Mod: GC | Performed by: OPHTHALMOLOGY

## 2021-09-20 PROCEDURE — G0463 HOSPITAL OUTPT CLINIC VISIT: HCPCS

## 2021-09-20 ASSESSMENT — VISUAL ACUITY
OD_SC+: -2
OS_SC: 20/30
OD_SC: 20/25
OD_PH_SC: 20/20
OS_SC+: +2
OS_PH_SC: 20/25
METHOD: SNELLEN - LINEAR
OD_PH_SC+: -1
OS_PH_SC+: +1

## 2021-09-20 ASSESSMENT — CUP TO DISC RATIO
OD_RATIO: 0.2
OS_RATIO: 0.2

## 2021-09-20 ASSESSMENT — EXTERNAL EXAM - RIGHT EYE: OD_EXAM: NORMAL

## 2021-09-20 ASSESSMENT — CONF VISUAL FIELD
OS_NORMAL: 1
OD_NORMAL: 1
METHOD: COUNTING FINGERS

## 2021-09-20 ASSESSMENT — SLIT LAMP EXAM - LIDS
COMMENTS: NORMAL
COMMENTS: NORMAL

## 2021-09-20 ASSESSMENT — EXTERNAL EXAM - LEFT EYE: OS_EXAM: NORMAL

## 2021-09-20 ASSESSMENT — TONOMETRY
OS_IOP_MMHG: 12
OD_IOP_MMHG: 12
IOP_METHOD: TONOPEN

## 2021-09-20 NOTE — PROGRESS NOTES
CC:  s/p CE/IOL right eye, NS left eye.    HPI: 62 y/o F with hx of CE/IOL right eye 8/22/18. At month 1 visit, found to have retained lens fragment in AC but minimal inflammation. Treated with topical prednisolone with resolution.     Interval history: Pt was last seen in 11/2018 after her right eye cataract surgery with Dr. Daniels.  She notes no problems with vision in either eye.  Denies pain, redness, vision problems, flashes, floaters, or other problems with the eyes. No photophobia, discharge. No night driving issues, no glare, halos.     POH:  -s/p CE/IOL right eye 8/22/18 with tiny retained lens fragment in AC    Gtts:  Clear Eyes.    A/P:  # S/p CE/IOL right eye 8/22/18   -with retained lens fragment POM#1   - lens frag gone   - no AC reaction, IOP wnl   - Doing well, monitor    # Nuclear sclerosis left eye   - Not visually significant, observe for now.    # Dry eyes OU  - recommended changing clear eyes to preservative free artificial tears  - discussed extensively the benefits of preservative free    RTC: 1 year e/VT, DFE, call if sx worsen to clinic.    Johny Saizn,   Fellow, Cornea & External Disease  Department of Ophthalmology  [facility]    Attending Physician Attestation:  Complete documentation of historical and exam elements from today's encounter can be found in the full encounter summary report (not reduplicated in this progress note).  I personally obtained the chief complaint(s) and history of present illness.  I confirmed and edited as necessary the review of systems, past medical/surgical history, family history, social history, and examination findings as documented by others; and I examined the patient myself.  I personally reviewed the relevant tests, images, and reports as documented above.  I formulated and edited as necessary the assessment and plan and discussed the findings and management plan with the patient and family. - Jude Alexandre MD

## 2021-09-20 NOTE — NURSING NOTE
Chief Complaints and History of Present Illnesses   Patient presents with     Annual Eye Exam     Chief Complaint(s) and History of Present Illness(es)     Annual Eye Exam     Laterality: both eyes    Course: stable    Associated symptoms: Negative for flashes, dryness, eye pain and floaters    Treatments tried: no treatments    Pain scale: 0/10              Comments     Pt here for annual eye exam  Pt states no change in VA since last visit    Lindsey BROWN 8:02 AM September 20, 2021

## 2021-10-01 DIAGNOSIS — I10 HYPERTENSION GOAL BP (BLOOD PRESSURE) < 140/90: ICD-10-CM

## 2021-10-01 DIAGNOSIS — I47.10 PAROXYSMAL SUPRAVENTRICULAR TACHYCARDIA (H): ICD-10-CM

## 2021-10-05 RX ORDER — VERAPAMIL HYDROCHLORIDE 240 MG/1
TABLET, FILM COATED, EXTENDED RELEASE ORAL
Qty: 90 TABLET | Refills: 0 | Status: SHIPPED | OUTPATIENT
Start: 2021-10-05 | End: 2022-02-02

## 2021-10-05 RX ORDER — LOSARTAN POTASSIUM 100 MG/1
TABLET ORAL
Qty: 90 TABLET | Refills: 0 | Status: SHIPPED | OUTPATIENT
Start: 2021-10-05 | End: 2022-02-02

## 2021-10-05 NOTE — TELEPHONE ENCOUNTER
Angiotensin-II Receptors Uxtarv23/01/2021 05:41 PM   Last blood pressure under 140/90 in past 12 months Protocol Details    Recent (12 mo) or future (30 days) visit within the authorizing provider's specialty     Medication is active on med list     Patient is age 18 or older     No active pregnancy on record     Normal serum creatinine on file in past 12 months     Normal serum potassium on file in past 12 months     No positive pregnancy test in past 12 months      Angiotensin-II Receptors Zaiyto08/01/2021 05:41 PM   Last blood pressure under 140/90 in past 12 months Protocol Details    Recent (12 mo) or future (30 days) visit within the authorizing provider's specialty     Medication is active on med list     Patient is age 18 or older     No active pregnancy on record     Normal serum creatinine on file in past 12 months     Normal serum potassium on file in past 12 months     No positive pregnancy test in past 12 months    Calcium Channel Blockers Protocol Gbxunr00/01/2021 05:41 PM   Blood pressure under 140/90 in past 12 months Protocol Details    Normal ALT in past 12 months     Recent (12 mo) or future (30 days) visit within the authorizing provider's specialty     Medication is active on med list     Patient is age 18 or older     No active pregnancy on record     Normal serum creatinine on file in past 12 months     No positive pregnancy test in past 12 months

## 2021-10-25 ENCOUNTER — TRANSFERRED RECORDS (OUTPATIENT)
Dept: HEALTH INFORMATION MANAGEMENT | Facility: CLINIC | Age: 61
End: 2021-10-25
Payer: COMMERCIAL

## 2021-11-22 ENCOUNTER — LAB (OUTPATIENT)
Dept: URGENT CARE | Facility: URGENT CARE | Age: 61
End: 2021-11-22
Attending: FAMILY MEDICINE
Payer: COMMERCIAL

## 2021-11-22 DIAGNOSIS — R05.9 COUGH: ICD-10-CM

## 2021-11-22 LAB — SARS-COV-2 RNA RESP QL NAA+PROBE: NEGATIVE

## 2021-11-22 PROCEDURE — U0005 INFEC AGEN DETEC AMPLI PROBE: HCPCS

## 2021-11-22 PROCEDURE — U0003 INFECTIOUS AGENT DETECTION BY NUCLEIC ACID (DNA OR RNA); SEVERE ACUTE RESPIRATORY SYNDROME CORONAVIRUS 2 (SARS-COV-2) (CORONAVIRUS DISEASE [COVID-19]), AMPLIFIED PROBE TECHNIQUE, MAKING USE OF HIGH THROUGHPUT TECHNOLOGIES AS DESCRIBED BY CMS-2020-01-R: HCPCS

## 2021-11-22 NOTE — LETTER
November 23, 2021      Kerri Timmons  [address] [place] [address]        Dear MsEmilieIain,    We are writing to inform you of your test results.    Dear Kerri,       Here are your recent results which are within the expected range. Please continue with your current plan of care and let us know if you have any questions or concerns.       Resulted Orders   Symptomatic COVID-19 Virus (Coronavirus) by PCR Nose   Result Value Ref Range    SARS CoV2 PCR Negative Negative, Testing sent to reference lab. Results will be returned via unsolicited result      Comment:      NEGATIVE: SARS-CoV-2 (COVID-19) RNA not detected, presumed negative.    Narrative    Testing was performed using the Xpert Xpress SARS-CoV-2 Assay on the  Cepheid Gene-Xpert Instrument Systems. Additional information about  this Emergency Use Authorization (EUA) assay can be found via the Lab  Guide. This test should be ordered for the detection of SARS-CoV-2 in  individuals who meet SARS-CoV-2 clinical and/or epidemiological  criteria. Test performance is unknown in asymptomatic patients. This  test is for in vitro diagnostic use under the FDA EUA for  laboratories certified under CLIA to perform high complexity testing.  This test has not been FDA cleared or approved. A negative result  does not rule out the presence of PCR inhibitors in the specimen or  target RNA in concentration below the limit of detection for the  assay. The possibility of a false negative should be considered if  the patient's recent exposure or clinical presentation suggests  COVID-19. This test was validated by the [facility]. This laboratory is certified under  the Clinical Laboratory Improvement Amendments of 1988 (CLIA-88) as  qualified to perform high complexity laboratory testing.         If you have any questions or concerns, please call the clinic at the number listed above.       Sincerely,      Sadie Erazo,  MD

## 2022-01-03 ENCOUNTER — TRANSFERRED RECORDS (OUTPATIENT)
Dept: HEALTH INFORMATION MANAGEMENT | Facility: CLINIC | Age: 62
End: 2022-01-03
Payer: COMMERCIAL

## 2022-02-02 DIAGNOSIS — I47.10 PAROXYSMAL SUPRAVENTRICULAR TACHYCARDIA (H): ICD-10-CM

## 2022-02-02 DIAGNOSIS — I10 HYPERTENSION GOAL BP (BLOOD PRESSURE) < 140/90: ICD-10-CM

## 2022-02-03 RX ORDER — LOSARTAN POTASSIUM 100 MG/1
100 TABLET ORAL DAILY
Qty: 90 TABLET | Refills: 0 | Status: SHIPPED | OUTPATIENT
Start: 2022-02-03 | End: 2022-02-23

## 2022-02-03 RX ORDER — VERAPAMIL HYDROCHLORIDE 240 MG/1
TABLET, FILM COATED, EXTENDED RELEASE ORAL
Qty: 90 TABLET | Refills: 0 | Status: SHIPPED | OUTPATIENT
Start: 2022-02-03 | End: 2022-02-23

## 2022-02-03 NOTE — TELEPHONE ENCOUNTER
Medication is being filled for 1 time refill only due to:  Patient needs to be seen because due for annual physical.     Team Coordinators: Please contact patient to set up annual physical for any further refills.     KELLY Henson RN  [facility]

## 2022-02-04 DIAGNOSIS — F33.1 MAJOR DEPRESSIVE DISORDER, RECURRENT EPISODE, MODERATE (H): ICD-10-CM

## 2022-02-04 RX ORDER — DULOXETIN HYDROCHLORIDE 60 MG/1
60 CAPSULE, DELAYED RELEASE ORAL DAILY
Qty: 90 CAPSULE | Refills: 0 | Status: SHIPPED | OUTPATIENT
Start: 2022-02-04 | End: 2022-02-23

## 2022-02-04 NOTE — TELEPHONE ENCOUNTER
Medication is being filled for 1 time refill only due to:  due for annual v isit     PHQ-9 score of less than 5 in past 6 months  PHQ 7/22/2020 10/29/2020 5/26/2021   PHQ-9 Total Score 0 0 0   Q9: Thoughts of better off dead/self-harm past 2 weeks Not at all Not at all Not at all       Future Office Visit:   Next 5 appointments (look out 90 days)    Feb 23, 2022  2:00 PM  (Arrive by 1:40 PM)  Adult Preventative Visit with Mira Draper NP  [facility] ([facility] ) [address] [place] [address] [phone]         Blanca Thakkar RN  [facility]

## 2022-02-19 ENCOUNTER — HEALTH MAINTENANCE LETTER (OUTPATIENT)
Age: 62
End: 2022-02-19

## 2022-02-19 ASSESSMENT — ENCOUNTER SYMPTOMS
MYALGIAS: 0
ABDOMINAL PAIN: 0
JOINT SWELLING: 0
CONSTIPATION: 0
CHILLS: 0
HEMATOCHEZIA: 0
ARTHRALGIAS: 1
DIZZINESS: 0
DIARRHEA: 0
COUGH: 0
HEMATURIA: 0
FEVER: 0
NAUSEA: 0
HEADACHES: 0
EYE PAIN: 0
DYSURIA: 0
WEAKNESS: 0
PALPITATIONS: 0
FREQUENCY: 0
SHORTNESS OF BREATH: 0
NERVOUS/ANXIOUS: 0
BREAST MASS: 0
PARESTHESIAS: 0
SORE THROAT: 0
HEARTBURN: 0

## 2022-02-23 ENCOUNTER — OFFICE VISIT (OUTPATIENT)
Dept: FAMILY MEDICINE | Facility: CLINIC | Age: 62
End: 2022-02-23
Payer: COMMERCIAL

## 2022-02-23 VITALS
BODY MASS INDEX: 23.57 KG/M2 | OXYGEN SATURATION: 96 % | WEIGHT: 133 LBS | HEART RATE: 73 BPM | TEMPERATURE: 97.5 F | HEIGHT: 63 IN | RESPIRATION RATE: 16 BRPM | SYSTOLIC BLOOD PRESSURE: 114 MMHG | DIASTOLIC BLOOD PRESSURE: 72 MMHG

## 2022-02-23 DIAGNOSIS — Z72.0 TOBACCO ABUSE: ICD-10-CM

## 2022-02-23 DIAGNOSIS — Z00.00 ROUTINE GENERAL MEDICAL EXAMINATION AT A HEALTH CARE FACILITY: Primary | ICD-10-CM

## 2022-02-23 DIAGNOSIS — F33.1 MAJOR DEPRESSIVE DISORDER, RECURRENT EPISODE, MODERATE (H): ICD-10-CM

## 2022-02-23 DIAGNOSIS — B00.1 COLD SORE: ICD-10-CM

## 2022-02-23 DIAGNOSIS — I10 HYPERTENSION GOAL BP (BLOOD PRESSURE) < 140/90: ICD-10-CM

## 2022-02-23 DIAGNOSIS — I47.10 PAROXYSMAL SUPRAVENTRICULAR TACHYCARDIA (H): ICD-10-CM

## 2022-02-23 PROCEDURE — 36415 COLL VENOUS BLD VENIPUNCTURE: CPT | Performed by: NURSE PRACTITIONER

## 2022-02-23 PROCEDURE — 80061 LIPID PANEL: CPT | Performed by: NURSE PRACTITIONER

## 2022-02-23 PROCEDURE — 80053 COMPREHEN METABOLIC PANEL: CPT | Performed by: NURSE PRACTITIONER

## 2022-02-23 PROCEDURE — 99396 PREV VISIT EST AGE 40-64: CPT | Performed by: NURSE PRACTITIONER

## 2022-02-23 PROCEDURE — 99214 OFFICE O/P EST MOD 30 MIN: CPT | Mod: 25 | Performed by: NURSE PRACTITIONER

## 2022-02-23 RX ORDER — VALACYCLOVIR HYDROCHLORIDE 1 G/1
2000 TABLET, FILM COATED ORAL 2 TIMES DAILY
Qty: 20 TABLET | Status: SHIPPED | OUTPATIENT
Start: 2022-02-23 | End: 2023-09-13

## 2022-02-23 RX ORDER — LOSARTAN POTASSIUM 100 MG/1
100 TABLET ORAL DAILY
Qty: 90 TABLET | Refills: 3 | Status: SHIPPED | OUTPATIENT
Start: 2022-02-23 | End: 2023-03-06

## 2022-02-23 RX ORDER — DULOXETIN HYDROCHLORIDE 60 MG/1
60 CAPSULE, DELAYED RELEASE ORAL DAILY
Qty: 90 CAPSULE | Refills: 3 | Status: SHIPPED | OUTPATIENT
Start: 2022-02-23 | End: 2023-03-06

## 2022-02-23 RX ORDER — VERAPAMIL HYDROCHLORIDE 240 MG/1
TABLET, FILM COATED, EXTENDED RELEASE ORAL
Qty: 90 TABLET | Refills: 3 | Status: SHIPPED | OUTPATIENT
Start: 2022-02-23 | End: 2023-03-06

## 2022-02-23 ASSESSMENT — ENCOUNTER SYMPTOMS
DIZZINESS: 0
HEMATOCHEZIA: 0
PARESTHESIAS: 0
HEMATURIA: 0
PALPITATIONS: 0
DIARRHEA: 0
NERVOUS/ANXIOUS: 0
MYALGIAS: 0
CONSTIPATION: 0
BREAST MASS: 0
SORE THROAT: 0
DYSURIA: 0
COUGH: 0
WEAKNESS: 0
FEVER: 0
EYE PAIN: 0
CHILLS: 0
SHORTNESS OF BREATH: 0
FREQUENCY: 0
HEADACHES: 0
ARTHRALGIAS: 1
HEARTBURN: 0
NAUSEA: 0
JOINT SWELLING: 0
ABDOMINAL PAIN: 0

## 2022-02-23 NOTE — PROGRESS NOTES
SUBJECTIVE:   CC: Kerri Timmons is an 61 year old woman who presents for preventive health visit.       Healthy Habits:     Getting at least 3 servings of Calcium per day:  Yes    Bi-annual eye exam:  Yes    Dental care twice a year:  Yes    Sleep apnea or symptoms of sleep apnea:  None    Diet:  Regular (no restrictions)    Frequency of exercise:  4-5 days/week    Duration of exercise:  30-45 minutes    Taking medications regularly:  Yes    PHQ-2 Total Score: 0    Additional concerns today:  No    Her mood has been good this winter.   She denies any depression symptoms and is doing well on her current dose of duloxetine without side effects.    Her blood pressure has been well-controlled on her current blood pressure medications.  She denies any side effects.               Today's PHQ-2 Score:   PHQ-2 ( 1999 Pfizer) 2/19/2022   Q1: Little interest or pleasure in doing things 0   Q2: Feeling down, depressed or hopeless 0   PHQ-2 Score 0   PHQ-2 Total Score (12-17 Years)- Positive if 3 or more points; Administer PHQ-A if positive -   Q1: Little interest or pleasure in doing things Not at all   Q2: Feeling down, depressed or hopeless Not at all   PHQ-2 Score 0       Abuse: Current or Past (Physical, Sexual or Emotional) - No  Do you feel safe in your environment? Yes    Have you ever done Advance Care Planning? (For example, a Health Directive, POLST, or a discussion with a medical provider or your loved ones about your wishes): No, advance care planning information given to patient to review.  Patient plans to discuss their wishes with loved ones or provider.      Social History     Tobacco Use     Smoking status: Light Tobacco Smoker     Packs/day: 0.00     Years: 0.00     Pack years: 0.00     Types: Cigarettes     Smokeless tobacco: Never Used     Tobacco comment: 3 per week    Substance Use Topics     Alcohol use: Yes     Comment: a couple drinks 3 times per week      If you drink alcohol do you typically have  >3 drinks per day or >7 drinks per week? No    Alcohol Use 2/23/2022   Prescreen: >3 drinks/day or >7 drinks/week? -   Prescreen: >3 drinks/day or >7 drinks/week? No   No flowsheet data found.    Reviewed orders with patient.  Reviewed health maintenance and updated orders accordingly - Yes      Breast Cancer Screening:    FHS-7:   Breast CA Risk Assessment (FHS-7) 7/20/2021 2/19/2022   Did any of your first-degree relatives have breast or ovarian cancer? No No   Did any of your relatives have bilateral breast cancer? No No   Did any man in your family have breast cancer? No No   Did any woman in your family have breast and ovarian cancer? No No   Did any woman in your family have breast cancer before age 50 y? No No   Do you have 2 or more relatives with breast and/or ovarian cancer? No No   Do you have 2 or more relatives with breast and/or bowel cancer? No No         Pertinent mammograms are reviewed under the imaging tab.    History of abnormal Pap smear: NO - age 30-65 PAP every 5 years with negative HPV co-testing recommended  PAP / HPV Latest Ref Rng & Units 6/17/2019 7/13/2015 5/15/2014   PAP (Historical) - NIL NIL OTHER-NIL, See Result   HPV16 NEG:Negative Negative - -   HPV18 NEG:Negative Negative - -   HRHPV NEG:Negative Negative - -     Reviewed and updated as needed this visit by clinical staff   Tobacco  Allergies  Meds  Problems  Med Hx  Surg Hx  Fam Hx  Soc   Hx        Reviewed and updated as needed this visit by Provider   Tobacco  Allergies  Meds  Problems  Med Hx  Surg Hx  Fam Hx             Review of Systems   Constitutional: Negative for chills and fever.   HENT: Negative for congestion, ear pain, hearing loss and sore throat.    Eyes: Negative for pain and visual disturbance.   Respiratory: Negative for cough and shortness of breath.    Cardiovascular: Negative for chest pain, palpitations and peripheral edema.   Gastrointestinal: Negative for abdominal pain, constipation,  "diarrhea, heartburn, hematochezia and nausea.   Breasts:  Negative for tenderness, breast mass and discharge.   Genitourinary: Negative for dysuria, frequency, genital sores, hematuria, pelvic pain, urgency, vaginal bleeding and vaginal discharge.   Musculoskeletal: Positive for arthralgias. Negative for joint swelling and myalgias.   Skin: Negative for rash.   Neurological: Negative for dizziness, weakness, headaches and paresthesias.   Psychiatric/Behavioral: Negative for mood changes. The patient is not nervous/anxious.           OBJECTIVE:   /72 (BP Location: Left arm, Patient Position: Sitting, Cuff Size: Adult Regular)   Pulse 73   Temp 97.5  F (36.4  C) (Temporal)   Resp 16   Ht 1.6 m (5' 3\")   Wt 60.3 kg (133 lb)   SpO2 96%   BMI 23.56 kg/m    Physical Exam  GENERAL: healthy, alert and no distress  EYES: Eyes grossly normal to inspection, PERRL and conjunctivae and sclerae normal  HENT: ear canals and TM's normal, nose and mouth without ulcers or lesions  NECK: no adenopathy, no asymmetry, masses, or scars and thyroid normal to palpation  RESP: lungs clear to auscultation - no rales, rhonchi or wheezes  CV: regular rate and rhythm, normal S1 S2, no S3 or S4, no murmur, click or rub, no peripheral edema and peripheral pulses strong  ABDOMEN: soft, nontender, no hepatosplenomegaly, no masses and bowel sounds normal  MS: no gross musculoskeletal defects noted, no edema  SKIN: no suspicious lesions or rashes  NEURO: Normal strength and tone, mentation intact and speech normal  PSYCH: mentation appears normal, affect normal/bright        ASSESSMENT/PLAN:   (Z00.00) Routine general medical examination at a health care facility  (primary encounter diagnosis)  Comment:   Plan: Lipid panel reflex to direct LDL Fasting, DX         Hip/Pelvis/Spine            (I10) Hypertension goal BP (blood pressure) < 140/90  Comment: at [facility]  Plan: losartan (COZAAR) 100 MG tablet, verapamil ER         (CALAN-SR) 240 MG " "CR tablet, Comprehensive         metabolic panel (BMP + Alb, Alk Phos, ALT, AST,        Total. Bili, TP)        The current medical regimen is effective;  continue present plan and medications.     (I47.1) Paroxysmal supraventricular tachycardia (H)  Comment: stable  Plan: verapamil ER (CALAN-SR) 240 MG CR tablet        The current medical regimen is effective;  continue present plan and medications.     (F33.1) Major depressive disorder, recurrent episode, moderate (H)  Comment: in complete remission  Plan: DULoxetine (CYMBALTA) 60 MG capsule        The current medical regimen is effective;  continue present plan and medications.     (B00.1) Cold sore  Comment:   Plan: valACYclovir (VALTREX) 1000 mg tablet        Refills given.     (Z72.0) Tobacco abuse  Comment:   Plan: nicotine (NICORETTE) 2 MG gum                  COUNSELING:  Reviewed preventive health counseling, as reflected in patient instructions    Estimated body mass index is 23.56 kg/m  as calculated from the following:    Height as of this encounter: 1.6 m (5' 3\").    Weight as of this encounter: 60.3 kg (133 lb).        She reports that she has been smoking cigarettes. She has been smoking about 0.00 packs per day for the past 0.00 years. She has never used smokeless tobacco.  Tobacco Cessation Action Plan:   Pharmacotherapies : other Nicotine replacement      Counseling Resources:  ATP IV Guidelines  Pooled Cohorts Equation Calculator  Breast Cancer Risk Calculator  BRCA-Related Cancer Risk Assessment: FHS-7 Tool  FRAX Risk Assessment  ICSI Preventive Guidelines  Dietary Guidelines for Americans, 2010  USDA's MyPlate  ASA Prophylaxis  Lung CA Screening    Mira Draper NP  [facility]  "

## 2022-02-23 NOTE — PATIENT INSTRUCTIONS
Take 2000 mg of Valtrex twice daily for one day at the onset of a cold sore.    Bone density test (DEXA scan) number is  [phone].           Preventive Health Recommendations  Female Ages 50 - 64    Yearly exam: See your health care provider every year in order to  o Review health changes.   o Discuss preventive care.    o Review your medicines if your doctor has prescribed any.      Get a Pap test every three years (unless you have an abnormal result and your provider advises testing more often).    If you get Pap tests with HPV test, you only need to test every 5 years, unless you have an abnormal result.     You do not need a Pap test if your uterus was removed (hysterectomy) and you have not had cancer.    You should be tested each year for STDs (sexually transmitted diseases) if you're at risk.     Have a mammogram every 1 to 2 years.    Have a colonoscopy at age 50, or have a yearly FIT test (stool test). These exams screen for colon cancer.      Have a cholesterol test every 5 years, or more often if advised.    Have a diabetes test (fasting glucose) every three years. If you are at risk for diabetes, you should have this test more often.     If you are at risk for osteoporosis (brittle bone disease), think about having a bone density scan (DEXA).    Shots: Get a flu shot each year. Get a tetanus shot every 10 years.    Nutrition:     Eat at least 5 servings of fruits and vegetables each day.    Eat whole-grain bread, whole-wheat pasta and brown rice instead of white grains and rice.    Get adequate Calcium and Vitamin D.     Lifestyle    Exercise at least 150 minutes a week (30 minutes a day, 5 days a week). This will help you control your weight and prevent disease.    Limit alcohol to one drink per day.    No smoking.     Wear sunscreen to prevent skin cancer.     See your dentist every six months for an exam and cleaning.    See your eye doctor every 1 to 2 years.

## 2022-02-24 LAB
ALBUMIN SERPL-MCNC: 3.9 G/DL (ref 3.4–5)
ALP SERPL-CCNC: 88 U/L (ref 40–150)
ALT SERPL W P-5'-P-CCNC: 34 U/L (ref 0–50)
ANION GAP SERPL CALCULATED.3IONS-SCNC: 8 MMOL/L (ref 3–14)
AST SERPL W P-5'-P-CCNC: 15 U/L (ref 0–45)
BILIRUB SERPL-MCNC: 0.6 MG/DL (ref 0.2–1.3)
BUN SERPL-MCNC: 14 MG/DL (ref 7–30)
CALCIUM SERPL-MCNC: 9.2 MG/DL (ref 8.5–10.1)
CHLORIDE BLD-SCNC: 104 MMOL/L (ref 94–109)
CHOLEST SERPL-MCNC: 233 MG/DL
CO2 SERPL-SCNC: 25 MMOL/L (ref 20–32)
CREAT SERPL-MCNC: 0.63 MG/DL (ref 0.52–1.04)
FASTING STATUS PATIENT QL REPORTED: YES
GFR SERPL CREATININE-BSD FRML MDRD: >90 ML/MIN/1.73M2
GLUCOSE BLD-MCNC: 114 MG/DL (ref 70–99)
HDLC SERPL-MCNC: 70 MG/DL
LDLC SERPL CALC-MCNC: 114 MG/DL
NONHDLC SERPL-MCNC: 163 MG/DL
POTASSIUM BLD-SCNC: 4.2 MMOL/L (ref 3.4–5.3)
PROT SERPL-MCNC: 7.3 G/DL (ref 6.8–8.8)
SODIUM SERPL-SCNC: 137 MMOL/L (ref 133–144)
TRIGL SERPL-MCNC: 246 MG/DL

## 2022-06-24 ENCOUNTER — OFFICE VISIT (OUTPATIENT)
Dept: DERMATOLOGY | Facility: CLINIC | Age: 62
End: 2022-06-24
Payer: COMMERCIAL

## 2022-06-24 DIAGNOSIS — L82.0 INFLAMED SEBORRHEIC KERATOSIS: ICD-10-CM

## 2022-06-24 DIAGNOSIS — B35.3 TINEA PEDIS OF BOTH FEET: ICD-10-CM

## 2022-06-24 DIAGNOSIS — T07.XXXA MULTIPLE EXCORIATIONS: Primary | ICD-10-CM

## 2022-06-24 DIAGNOSIS — D48.9 NEOPLASM OF UNCERTAIN BEHAVIOR: ICD-10-CM

## 2022-06-24 PROCEDURE — 17110 DESTRUCTION B9 LES UP TO 14: CPT | Performed by: NURSE PRACTITIONER

## 2022-06-24 PROCEDURE — 88305 TISSUE EXAM BY PATHOLOGIST: CPT | Performed by: DERMATOLOGY

## 2022-06-24 PROCEDURE — 99214 OFFICE O/P EST MOD 30 MIN: CPT | Mod: 25 | Performed by: NURSE PRACTITIONER

## 2022-06-24 PROCEDURE — 11102 TANGNTL BX SKIN SINGLE LES: CPT | Mod: XS | Performed by: NURSE PRACTITIONER

## 2022-06-24 RX ORDER — MUPIROCIN 20 MG/G
OINTMENT TOPICAL
Qty: 30 G | Refills: 3 | Status: SHIPPED | OUTPATIENT
Start: 2022-06-24 | End: 2023-09-13

## 2022-06-24 RX ORDER — CLOTRIMAZOLE 1 %
CREAM (GRAM) TOPICAL 2 TIMES DAILY
Qty: 113 G | Refills: 3 | Status: SHIPPED | OUTPATIENT
Start: 2022-06-24 | End: 2023-09-13

## 2022-06-24 ASSESSMENT — PAIN SCALES - GENERAL: PAINLEVEL: SEVERE PAIN (6)

## 2022-06-24 NOTE — PATIENT INSTRUCTIONS
Marianna and Jannet self corona    Mupirocin is for your sores when you get them.     Clotrimazole cream is for your feet. Apply from ankles down and between each toe twice daily until 2 weeks after the peeling between your toes is completely healed.     Wound Care After a Biopsy    What is a skin biopsy?  A skin biopsy allows the doctor to examine a very small piece of tissue under the microscope to determine the diagnosis and the best treatment for the skin condition. A local anesthetic (numbing medicine)  is injected with a very small needle into the skin area to be tested. A small piece of skin is taken from the area. Sometimes a suture (stitch) is used.     What are the risks of a skin biopsy?  I will experience scar, bleeding, swelling, pain, crusting and redness. I may experience incomplete removal or recurrence. Risks of this procedure are excessive bleeding, bruising, infection, nerve damage, numbness, thick (hypertrophic or keloidal) scar and non-diagnostic biopsy.    How should I care for my wound for the first 24 hours?  Keep the wound dry and covered for 24 hours  If it bleeds, hold direct pressure on the area for 15 minutes. If bleeding does not stop then go to the emergency room  Avoid strenuous exercise the first 1-2 days or as your doctor instructs you    How should I care for the wound after 24 hours?  After 24 hours, remove the bandage  You may bathe or shower as normal  If you had a scalp biopsy, you can shampoo as usual and can use shower water to clean the biopsy site daily  Clean the wound twice a day with gentle soap and water  Do not scrub, be gentle  Apply white petroleum/Vaseline after cleaning the wound with a cotton swab or a clean finger, and keep the site covered with a Bandaid /bandage. Bandages are not necessary with a scalp biopsy  If you are unable to cover the site with a Bandaid /bandage, re-apply ointment 2-3 times a day to keep the site moist. Moisture will help with healing  Avoid  strenuous activity for first 1-2 days  Avoid lakes, rivers, pools, and oceans until the stitches are removed or the site is healed    How do I clean my wound?  Wash hands thoroughly with soap or use hand  before all wound care  Clean the wound with gentle soap and water  Apply white petroleum/Vaseline  to wound after it is clean  Replace the Bandaid /bandage to keep the wound covered for the first few days or as instructed by your doctor  If you had a scalp biopsy, warm shower water to the area on a daily basis should suffice    What should I use to clean my wound?   Cotton-tipped applicators (Qtips )  White petroleum jelly (Vaseline ). Use a clean new container and use Q-tips to apply.  Bandaids   as needed  Gentle soap     How should I care for my wound long term?  Do not get your wound dirty  Keep up with wound care for one week or until the area is healed.  A small scab will form and fall off by itself when the area is completely healed. The area will be red and will become pink in color as it heals. Sun protection is very important for how your scar will turn out. Sunscreen with an SPF 30 or greater is recommended once the area is healed.  If you have stitches, stitches need to be removed in 7 days on the face/neck, or 10-14 days on the body days. You may return to our clinic for this or you may have it done locally at your doctor s office.  You should have some soreness but it should be mild and slowly go away over several days. Talk to your doctor about using tylenol for pain,    When should I call my doctor?  If you have increased:   Pain or swelling  Pus or drainage (clear or slightly yellow drainage is ok)  Temperature over 100F  Spreading redness or warmth around wound    When will I hear about my results?  The biopsy results can take 2 weeks to come back.  Your results will automatically release to [employer] before your provider has even reviewed them.  The clinic will call you with the results,  send you a [employer] message, or have you schedule a follow-up clinic or phone time to discuss the results.  Contact our clinics if you do not hear from us in 2 weeks.    Who should I call with questions?  [facility]: [phone]  [facility]: [phone]  For urgent needs outside of business hours call the [facility] at [phone] and ask for the dermatology resident on call Cryotherapy    What is it?  Use of a very cold liquid, such as liquid nitrogen, to freeze and destroy abnormal skin cells that need to be removed    What should I expect?  Tenderness and redness  A small blister that might grow and fill with dark purple blood. There may be crusting.  More than one treatment may be needed if the lesions do not go away.    How do I care for the treated area?  Gently wash the area with your hands when bathing.  Use a thin layer of Vaseline to help with healing. You may use a Band-Aid.   The area should heal within 7-10 days and may leave behind a pink or lighter color.   Do not use an antibiotic or Neosporin ointment.   You may take acetaminophen (Tylenol) for pain.     Call your doctor if you have:  Severe pain  Signs of infection (warmth, redness, cloudy yellow drainage, and or a bad smell)  Questions or concerns    Who should I call with questions?      [facility]: [phone]      [facility]: [phone]      For urgent needs outside of business hours call the [facility] at [phone] and ask for the dermatology resident on call

## 2022-06-24 NOTE — PROGRESS NOTES
[facility] Dermatology Note  Encounter Date: Jun 24, 2022  Office Visit     Reviewed patients past medical history and pertinent chart review prior to patients visit today.     Dermatology Problem List:  Patient denies personal history of skin cancer or dysplastic nevi.      Biopsy of Seborrheic keratosis 2002    ____________________________________________    Assessment & Plan:     # Neoplasm of uncertain behavior:  Right upper back  DDx includes prurigo nodule, rule out malignancy. Shave biopsy today.    Procedure Note: Biopsy by shave technique  The risks and benefits of the procedure were described to the patient. These include but are not limited to bleeding, infection, scar, incomplete removal, and non-diagnostic biopsy. Verbal informed consent was obtained. The above site(s) was cleansed with an alcohol pad and injected with 1% lidocaine with epinephrine. Once anesthesia was obtained, a biopsy(ies) was performed with Gilette blade. The tissue(s) was placed in a labeled container(s) with formalin and sent to pathology. Hemostasis was achieved with aluminum chloride. Vaseline and a bandage were applied to the wound(s). The patient tolerated the procedure well and was given post biopsy care instructions.     # Irritated and inflamed Seborrheic Keratosis. Discussed treatment options with patient including no treatment, cryotherapy, and shave removal. Patient prefers cryotherapy today due to irritation and itching. After verbal consent and discussion of risks and benefits including but no limited to dyspigmentation/scar, blister, and pain, 1 was(were) treated with 1-2mm freeze border for 2 cycles with liquid nitrogen. Post cryotherapy instructions were provided.       #multiple excoriations. Given mupirocin ointment to be applied 1-2 times daily under occlusion to any sores she develops until healed.     #tinea pedis. Given clotrimazole cream to apply moccasin style BID until 2 weeks after rash  is resolved. Likely 6-8 weeks.  Okay to use 2-3 times weekly as prevention if this is a recurrent issue for her and restart treatment if rash recurs.    Deborah Man, APRN CNP on 6/24/2022 at 7:32 AM   _______________________________________    CC: Derm Problem (Area of concern on back)    HPI:  Ms. Kerri Timmons is a(n) 61 year old female who presents today as a return patient for a recurring seborrheic keratosis on her back. She thinks this is the same spot that was biopsied in 2002. She developed a few more this year and she got concerned about them. They are itchy and bothersome.     Patient is otherwise feeling well, without additional skin concerns.      Physical Exam:  Vitals: There were no vitals taken for this visit.  SKIN: Focused examination of back was performed.  - several excoriated ulcerations on extremities and right upper back.   -right upper back, ulceration with lichenification and surrounding erythema  -right lateral upper back, erythemeatous cerebriform crusted papule  -Interdigital webspace maceration of bilateral toes with fissure at the base of the fourth toes on right and left feet    - No other lesions of concern on areas examined.     Medications:  Current Outpatient Medications   Medication     cholestyramine (QUESTRAN) 4 GM/DOSE powder     DULoxetine (CYMBALTA) 60 MG capsule     losartan (COZAAR) 100 MG tablet     MULTI-VITAMIN OR TABS     nicotine (NICORETTE) 2 MG gum     penciclovir (DENAVIR) 1 % external cream     valACYclovir (VALTREX) 1000 mg tablet     verapamil ER (CALAN-SR) 240 MG CR tablet     No current facility-administered medications for this visit.      Past Medical History:   Patient Active Problem List   Diagnosis     Abnormal maternal glucose tolerance, antepartum     Malaise and fatigue     Disturbance in sleep behavior     Paroxysmal supraventricular tachycardia (H)     Cold sore     CARDIOVASCULAR SCREENING; LDL GOAL LESS THAN 160     ASCUS  Pap smear; + high  risk HPV DNA     Health Care Home     Enthesopathy     Post concussive syndrome     [place] (hard of hearing)     Cataract, right eye     Hypertension goal BP (blood pressure) < 140/90     Major depressive disorder, recurrent episode, moderate (H)     Past Medical History:   Diagnosis Date     ASCUS on Pap smear 11/30/2010    colp wnl     Cataract, right eye 06/17/2017     Depressive disorder      Diabetes (H)     h/o gestational     Hypertension      SVT (supraventricular tachycardia) (H) 2008       CC Referred Self, MD  No address on file on close of this encounter.

## 2022-06-24 NOTE — LETTER
6/24/2022         RE: Kerri Timmons  [address] [place] [address]        Dear Colleague,    Thank you for referring your patient, Kerri Timmons, to the [facility]. Please see a copy of my visit note below.    [facility] Dermatology Note  Encounter Date: Jun 24, 2022  Office Visit     Reviewed patients past medical history and pertinent chart review prior to patients visit today.     Dermatology Problem List:  Patient denies personal history of skin cancer or dysplastic nevi.      Biopsy of Seborrheic keratosis 2002    ____________________________________________    Assessment & Plan:     # Neoplasm of uncertain behavior:  Right upper back  DDx includes prurigo nodule, rule out malignancy. Shave biopsy today.    Procedure Note: Biopsy by shave technique  The risks and benefits of the procedure were described to the patient. These include but are not limited to bleeding, infection, scar, incomplete removal, and non-diagnostic biopsy. Verbal informed consent was obtained. The above site(s) was cleansed with an alcohol pad and injected with 1% lidocaine with epinephrine. Once anesthesia was obtained, a biopsy(ies) was performed with Gilette blade. The tissue(s) was placed in a labeled container(s) with formalin and sent to pathology. Hemostasis was achieved with aluminum chloride. Vaseline and a bandage were applied to the wound(s). The patient tolerated the procedure well and was given post biopsy care instructions.     # Irritated and inflamed Seborrheic Keratosis. Discussed treatment options with patient including no treatment, cryotherapy, and shave removal. Patient prefers cryotherapy today due to irritation and itching. After verbal consent and discussion of risks and benefits including but no limited to dyspigmentation/scar, blister, and pain, 1 was(were) treated with 1-2mm freeze border for 2 cycles with liquid nitrogen. Post cryotherapy instructions  were provided.       #multiple excoriations. Given mupirocin ointment to be applied 1-2 times daily under occlusion to any sores she develops until healed.     #tinea pedis. Given clotrimazole cream to apply moccasin style BID until 2 weeks after rash is resolved. Likely 6-8 weeks.  Okay to use 2-3 times weekly as prevention if this is a recurrent issue for her and restart treatment if rash recurs.    Deborah Man, APRN CNP on 6/24/2022 at 7:32 AM   _______________________________________    CC: Derm Problem (Area of concern on back)    HPI:  Ms. Kerri Timmons is a(n) 61 year old female who presents today as a return patient for a recurring seborrheic keratosis on her back. She thinks this is the same spot that was biopsied in 2002. She developed a few more this year and she got concerned about them. They are itchy and bothersome.     Patient is otherwise feeling well, without additional skin concerns.      Physical Exam:  Vitals: There were no vitals taken for this visit.  SKIN: Focused examination of back was performed.  - several excoriated ulcerations on extremities and right upper back.   -right upper back, ulceration with lichenification and surrounding erythema  -right lateral upper back, erythemeatous cerebriform crusted papule  -Interdigital webspace maceration of bilateral toes with fissure at the base of the fourth toes on right and left feet    - No other lesions of concern on areas examined.     Medications:  Current Outpatient Medications   Medication     cholestyramine (QUESTRAN) 4 GM/DOSE powder     DULoxetine (CYMBALTA) 60 MG capsule     losartan (COZAAR) 100 MG tablet     MULTI-VITAMIN OR TABS     nicotine (NICORETTE) 2 MG gum     penciclovir (DENAVIR) 1 % external cream     valACYclovir (VALTREX) 1000 mg tablet     verapamil ER (CALAN-SR) 240 MG CR tablet     No current facility-administered medications for this visit.      Past Medical History:   Patient Active Problem List   Diagnosis      Abnormal maternal glucose tolerance, antepartum     Malaise and fatigue     Disturbance in sleep behavior     Paroxysmal supraventricular tachycardia (H)     Cold sore     CARDIOVASCULAR SCREENING; LDL GOAL LESS THAN 160     ASCUS  Pap smear; + high risk HPV DNA     Health Care Home     Enthesopathy     Post concussive syndrome     [place] (hard of hearing)     Cataract, right eye     Hypertension goal BP (blood pressure) < 140/90     Major depressive disorder, recurrent episode, moderate (H)     Past Medical History:   Diagnosis Date     ASCUS on Pap smear 11/30/2010    colp wnl     Cataract, right eye 06/17/2017     Depressive disorder      Diabetes (H)     h/o gestational     Hypertension      SVT (supraventricular tachycardia) (H) 2008       CC Referred Self, MD  No address on file on close of this encounter.       Again, thank you for allowing me to participate in the care of your patient.        Sincerely,        KELVIN Allen CNP

## 2022-06-29 LAB
PATH REPORT.COMMENTS IMP SPEC: NORMAL
PATH REPORT.COMMENTS IMP SPEC: NORMAL
PATH REPORT.FINAL DX SPEC: NORMAL
PATH REPORT.GROSS SPEC: NORMAL
PATH REPORT.MICROSCOPIC SPEC OTHER STN: NORMAL
PATH REPORT.RELEVANT HX SPEC: NORMAL

## 2022-07-17 DIAGNOSIS — B00.1 COLD SORE: ICD-10-CM

## 2022-07-18 DIAGNOSIS — B00.1 RECURRENT COLD SORES: ICD-10-CM

## 2022-07-18 RX ORDER — VALACYCLOVIR HYDROCHLORIDE 500 MG/1
500 TABLET, FILM COATED ORAL DAILY
Qty: 90 TABLET | Refills: 1 | Status: SHIPPED | OUTPATIENT
Start: 2022-07-18 | End: 2023-09-17

## 2022-07-18 NOTE — TELEPHONE ENCOUNTER
valACYclovir (VALTREX) 500 MG tablet (Discontinued)      Last Written Prescription Date:  1-8-21  Last Fill Quantity: 90 tab,   # refills: 0  Last Office Visit: 2-23-22  Future Office visit:       Routing refill request to provider for review/approval because:  Drug not active on patient's medication list  Medication is reported/historical

## 2022-07-18 NOTE — TELEPHONE ENCOUNTER
Routing refill request to provider for review/approval because:  Drug not active on patient's medication list  A break in medication    Last filled:   valACYclovir (VALTREX) 500 MG tablet (Discontinued) 90 tablet 0 10/29/2020 1/18/2021     Last visit: 2/23/2022 with ALKA    Upcoming visit: none    Thank you

## 2022-07-19 RX ORDER — PENCICLOVIR 10 MG/G
CREAM TOPICAL
Qty: 5 G | Status: SHIPPED | OUTPATIENT
Start: 2022-07-19

## 2022-07-19 NOTE — TELEPHONE ENCOUNTER
Routing refill request to provider for review/approval because:  Drug not on the [facility] refill protocol     Last filled:  penciclovir (DENAVIR) 1 % external cream 5 g PRN 10/29/2020     Last visit: 2/23/2022 with ALKA    Upcoming visit: none    Thank you

## 2022-09-27 ENCOUNTER — ANCILLARY PROCEDURE (OUTPATIENT)
Dept: MAMMOGRAPHY | Facility: CLINIC | Age: 62
End: 2022-09-27
Attending: NURSE PRACTITIONER
Payer: COMMERCIAL

## 2022-09-27 ENCOUNTER — ANCILLARY PROCEDURE (OUTPATIENT)
Dept: BONE DENSITY | Facility: CLINIC | Age: 62
End: 2022-09-27
Attending: NURSE PRACTITIONER
Payer: COMMERCIAL

## 2022-09-27 DIAGNOSIS — Z12.31 VISIT FOR SCREENING MAMMOGRAM: ICD-10-CM

## 2022-09-27 DIAGNOSIS — Z00.00 ROUTINE GENERAL MEDICAL EXAMINATION AT A HEALTH CARE FACILITY: ICD-10-CM

## 2022-09-27 PROCEDURE — 77063 BREAST TOMOSYNTHESIS BI: CPT | Mod: GC | Performed by: RADIOLOGY

## 2022-09-27 PROCEDURE — 77080 DXA BONE DENSITY AXIAL: CPT | Performed by: INTERNAL MEDICINE

## 2022-09-27 PROCEDURE — 77067 SCR MAMMO BI INCL CAD: CPT | Mod: GC | Performed by: RADIOLOGY

## 2022-10-16 ENCOUNTER — HEALTH MAINTENANCE LETTER (OUTPATIENT)
Age: 62
End: 2022-10-16

## 2022-11-25 ENCOUNTER — OFFICE VISIT (OUTPATIENT)
Dept: URGENT CARE | Facility: URGENT CARE | Age: 62
End: 2022-11-25
Payer: COMMERCIAL

## 2022-11-25 ENCOUNTER — ANCILLARY PROCEDURE (OUTPATIENT)
Dept: GENERAL RADIOLOGY | Facility: CLINIC | Age: 62
End: 2022-11-25
Attending: PHYSICIAN ASSISTANT
Payer: COMMERCIAL

## 2022-11-25 ENCOUNTER — TELEPHONE (OUTPATIENT)
Dept: FAMILY MEDICINE | Facility: CLINIC | Age: 62
End: 2022-11-25

## 2022-11-25 VITALS
HEART RATE: 85 BPM | TEMPERATURE: 98.3 F | DIASTOLIC BLOOD PRESSURE: 82 MMHG | OXYGEN SATURATION: 97 % | BODY MASS INDEX: 23.56 KG/M2 | WEIGHT: 133 LBS | SYSTOLIC BLOOD PRESSURE: 118 MMHG | RESPIRATION RATE: 16 BRPM

## 2022-11-25 DIAGNOSIS — A68.9 RECURRENT FEVER: ICD-10-CM

## 2022-11-25 DIAGNOSIS — R05.1 ACUTE COUGH: Primary | ICD-10-CM

## 2022-11-25 LAB
ALBUMIN SERPL-MCNC: 3.5 G/DL (ref 3.4–5)
ALBUMIN UR-MCNC: NEGATIVE MG/DL
ALP SERPL-CCNC: 91 U/L (ref 40–150)
ALT SERPL W P-5'-P-CCNC: 22 U/L (ref 0–50)
ANION GAP SERPL CALCULATED.3IONS-SCNC: 6 MMOL/L (ref 3–14)
APPEARANCE UR: CLEAR
AST SERPL W P-5'-P-CCNC: 24 U/L (ref 0–45)
BILIRUB SERPL-MCNC: 0.9 MG/DL (ref 0.2–1.3)
BILIRUB UR QL STRIP: NEGATIVE
BUN SERPL-MCNC: 11 MG/DL (ref 7–30)
CALCIUM SERPL-MCNC: 10 MG/DL (ref 8.5–10.1)
CHLORIDE BLD-SCNC: 103 MMOL/L (ref 94–109)
CO2 SERPL-SCNC: 30 MMOL/L (ref 20–32)
COLOR UR AUTO: YELLOW
CREAT SERPL-MCNC: 0.6 MG/DL (ref 0.52–1.04)
ERYTHROCYTE [DISTWIDTH] IN BLOOD BY AUTOMATED COUNT: 12.2 % (ref 10–15)
ERYTHROCYTE [SEDIMENTATION RATE] IN BLOOD BY WESTERGREN METHOD: 30 MM/HR (ref 0–30)
GFR SERPL CREATININE-BSD FRML MDRD: >90 ML/MIN/1.73M2
GLUCOSE BLD-MCNC: 110 MG/DL (ref 70–99)
GLUCOSE UR STRIP-MCNC: NEGATIVE MG/DL
HCT VFR BLD AUTO: 39.2 % (ref 35–47)
HGB BLD-MCNC: 13.1 G/DL (ref 11.7–15.7)
HGB UR QL STRIP: NEGATIVE
KETONES UR STRIP-MCNC: NEGATIVE MG/DL
LEUKOCYTE ESTERASE UR QL STRIP: NEGATIVE
MCH RBC QN AUTO: 33.6 PG (ref 26.5–33)
MCHC RBC AUTO-ENTMCNC: 33.4 G/DL (ref 31.5–36.5)
MCV RBC AUTO: 101 FL (ref 78–100)
NITRATE UR QL: NEGATIVE
PH UR STRIP: 5.5 [PH] (ref 5–7)
PLATELET # BLD AUTO: 391 10E3/UL (ref 150–450)
POTASSIUM BLD-SCNC: 4.4 MMOL/L (ref 3.4–5.3)
PROT SERPL-MCNC: 7.7 G/DL (ref 6.8–8.8)
RBC # BLD AUTO: 3.9 10E6/UL (ref 3.8–5.2)
SODIUM SERPL-SCNC: 139 MMOL/L (ref 133–144)
SP GR UR STRIP: 1.01 (ref 1–1.03)
TSH SERPL DL<=0.005 MIU/L-ACNC: 4.17 UIU/ML (ref 0.3–4.2)
UROBILINOGEN UR STRIP-ACNC: 0.2 E.U./DL
WBC # BLD AUTO: 12.5 10E3/UL (ref 4–11)

## 2022-11-25 PROCEDURE — 71046 X-RAY EXAM CHEST 2 VIEWS: CPT | Mod: TC | Performed by: RADIOLOGY

## 2022-11-25 PROCEDURE — U0005 INFEC AGEN DETEC AMPLI PROBE: HCPCS | Performed by: PHYSICIAN ASSISTANT

## 2022-11-25 PROCEDURE — 81003 URINALYSIS AUTO W/O SCOPE: CPT | Performed by: PHYSICIAN ASSISTANT

## 2022-11-25 PROCEDURE — 85652 RBC SED RATE AUTOMATED: CPT | Performed by: PHYSICIAN ASSISTANT

## 2022-11-25 PROCEDURE — 36415 COLL VENOUS BLD VENIPUNCTURE: CPT | Performed by: PHYSICIAN ASSISTANT

## 2022-11-25 PROCEDURE — U0003 INFECTIOUS AGENT DETECTION BY NUCLEIC ACID (DNA OR RNA); SEVERE ACUTE RESPIRATORY SYNDROME CORONAVIRUS 2 (SARS-COV-2) (CORONAVIRUS DISEASE [COVID-19]), AMPLIFIED PROBE TECHNIQUE, MAKING USE OF HIGH THROUGHPUT TECHNOLOGIES AS DESCRIBED BY CMS-2020-01-R: HCPCS | Performed by: PHYSICIAN ASSISTANT

## 2022-11-25 PROCEDURE — 84443 ASSAY THYROID STIM HORMONE: CPT | Performed by: PHYSICIAN ASSISTANT

## 2022-11-25 PROCEDURE — 85027 COMPLETE CBC AUTOMATED: CPT | Performed by: PHYSICIAN ASSISTANT

## 2022-11-25 PROCEDURE — 99214 OFFICE O/P EST MOD 30 MIN: CPT | Mod: CS | Performed by: PHYSICIAN ASSISTANT

## 2022-11-25 PROCEDURE — 86618 LYME DISEASE ANTIBODY: CPT | Performed by: PHYSICIAN ASSISTANT

## 2022-11-25 PROCEDURE — 80053 COMPREHEN METABOLIC PANEL: CPT | Performed by: PHYSICIAN ASSISTANT

## 2022-11-25 PROCEDURE — 87389 HIV-1 AG W/HIV-1&-2 AB AG IA: CPT | Performed by: PHYSICIAN ASSISTANT

## 2022-11-25 RX ORDER — DOXYCYCLINE HYCLATE 100 MG
100 TABLET ORAL 2 TIMES DAILY
Qty: 20 TABLET | Refills: 0 | Status: SHIPPED | OUTPATIENT
Start: 2022-11-25 | End: 2022-12-05

## 2022-11-25 NOTE — PROGRESS NOTES
Assessment & Plan     1. Acute cough  - doxycycline hyclate (VIBRA-TABS) 100 MG tablet; Take 1 tablet (100 mg) by mouth 2 times daily for 10 days  Dispense: 20 tablet; Refill: 0  - Symptomatic; Unknown COVID-19 Virus (Coronavirus) by PCR Nose    2. Recurrent fever  - CBC with platelets; Future  - Comprehensive metabolic panel; Future  - UA macro with reflex to Microscopic and Culture - Clinc Collect  - XR Chest 2 Views; Future  - ESR: Erythrocyte sedimentation rate; Future  - Lyme Disease Total Abs Bld with Reflex to Confirm CLIA; Future  - HIV Antigen Antibody Combo; Future  - TSH with free T4 reflex; Future  - CBC with platelets  - Comprehensive metabolic panel  - ESR: Erythrocyte sedimentation rate  - Lyme Disease Total Abs Bld with Reflex to Confirm CLIA  - HIV Antigen Antibody Combo  - TSH with free T4 reflex  - Symptomatic; Unknown COVID-19 Virus (Coronavirus) by PCR Nose    Patient presents to clinic for evaluation of recurrent fevers occurring over the past 3 weeks, along with dry cough.  On examination, she appears well.  Vital signs are stable.  No laboratory evidence of anemia, thrombocytopenia, electrolyte derangement, renal or hepatic failure or insufficiency.  She does have leukocytosis, with normal inflammatory markers.  Chest x-ray is negative, per my read and urinalysis is negative.  I suspect that she has a secondary infection, such as walking pneumonia.  We will treat with doxycycline.  Discussed with patient that if she were to develop recurrence of fevers, worsening symptoms, chest pain, shortness of breath, severe headache, syncope etc.  She would need to follow-up right away.    Return in about 5 days (around 11/30/2022), or if symptoms worsen or fail to improve.    Diagnosis and treatment plan was reviewed with patient and/or family.   We went over any labs or imaging. Discussed worsening symptoms or little to no relief despite treatment plan to follow-up with PCP or return to  clinic.  Patient verbalizes understanding. All questions were addressed and answered.     AGNES Rodriguez [facility]    CHIEF COMPLAINT:   Chief Complaint   Patient presents with     Sick     Fever, dry cough, chest pain, bodyaches x 3 weeks -- temp is up and down  -- many NEG covid test in  the past few weeks -- possible chest xray     Lupis Manning is a 62 year old female who presents to clinic today for evaluation.  Three weeks ago, patient developed influenza like illness, temperature up 102, lasted 3 days and then she was feeling better. The same symptoms occurred the following two weeks.    Fever of 100 yesterday. Very fatigued at the time.   In the past week, she has had a dry cough. Feels like something has settled into her chest. No nasal congestion or post nasal drainage. No travel or surgery.   No leg pain.  No rashes or bruising.   Intermittent headache.   No weight loss or night sweats    COVID tests have been negative.      Past Medical History:   Diagnosis Date     ASCUS on Pap smear 11/30/2010    colp wnl     Cataract, right eye 06/17/2017     Depressive disorder      Diabetes (H)     h/o gestational     Hypertension      SVT (supraventricular tachycardia) (H) 2008     Past Surgical History:   Procedure Laterality Date     APPENDECTOMY       CATARACT IOL, RT/LT Right 08/24/2018     CHOLECYSTECTOMY       COLONOSCOPY N/A 7/29/2021    Procedure: COLONOSCOPY, WITH POLYPECTOMY AND BIOPSY;  Surgeon: Po Constantino MD;  Location: [facility]     PHACOEMULSIFICATION WITH STANDARD INTRAOCULAR LENS IMPLANT Right 8/22/2018    Procedure: PHACOEMULSIFICATION WITH STANDARD INTRAOCULAR LENS IMPLANT;  Right Eye Phacoemulsification with Standard Intraocular Lens;  Surgeon: Johny Daniels MD;  Location:  OR     Social History     Tobacco Use     Smoking status: Light Smoker     Packs/day: 0.00     Years: 0.00     Pack years: 0.00     Types: Cigarettes     Smokeless tobacco:  Never     Tobacco comments:     3 per week    Substance Use Topics     Alcohol use: Yes     Comment: a couple drinks 3 times per week      Current Outpatient Medications   Medication     cholestyramine (QUESTRAN) 4 GM/DOSE powder     clotrimazole (LOTRIMIN) 1 % external cream     doxycycline hyclate (VIBRA-TABS) 100 MG tablet     DULoxetine (CYMBALTA) 60 MG capsule     losartan (COZAAR) 100 MG tablet     MULTI-VITAMIN OR TABS     mupirocin (BACTROBAN) 2 % external ointment     nicotine (NICORETTE) 2 MG gum     penciclovir (DENAVIR) 1 % external cream     valACYclovir (VALTREX) 1000 mg tablet     valACYclovir (VALTREX) 500 MG tablet     verapamil ER (CALAN-SR) 240 MG CR tablet     No current facility-administered medications for this visit.     Allergies   Allergen Reactions     No Known Drug Allergies        10 point ROS of systems were all negative except for pertinent positives noted in my HPI.      Exam:   /82 (BP Location: Right arm, Patient Position: Chair, Cuff Size: Adult Regular)   Pulse 85   Temp 98.3  F (36.8  C) (Oral)   Resp 16   Wt 60.3 kg (133 lb)   SpO2 97%   BMI 23.56 kg/m    Constitutional: healthy, alert and no distress  Head: Normocephalic, atraumatic.  Eyes: conjunctiva clear, no drainage  ENT: TMs clear and shiny lavon, nasal mucosa pink and moist, throat without tonsillar hypertrophy or erythema  Neck: neck is supple, no cervical lymphadenopathy or nuchal rigidity  Cardiovascular: RRR  Respiratory: CTA bilaterally, no rhonchi or rales  Gastrointestinal: soft and nontender  Skin: no rashes  Neurologic: Speech clear, gait normal. Moves all extremities.    Results for orders placed or performed in visit on 11/25/22   XR Chest 2 Views     Status: None    Narrative    XR CHEST 2 VIEWS 11/25/2022 12:31 PM    HISTORY: Recurrent fever    COMPARISON: 6/11/2008      Impression    IMPRESSION: No infiltrate, pleural effusion or pneumothorax. The  cardiac and mediastinal silhouettes are  normal.    JEFFREY MARMOLEJO MD         SYSTEM ID:  [id number]   Results for orders placed or performed in visit on 11/25/22   UA macro with reflex to Microscopic and Culture - Clinc Collect     Status: Normal    Specimen: Urine, Clean Catch   Result Value Ref Range    Color Urine Yellow Colorless, Straw, Light Yellow, Yellow    Appearance Urine Clear Clear    Glucose Urine Negative Negative mg/dL    Bilirubin Urine Negative Negative    Ketones Urine Negative Negative mg/dL    Specific Gravity Urine 1.015 1.003 - 1.035    Blood Urine Negative Negative    pH Urine 5.5 5.0 - 7.0    Protein Albumin Urine Negative Negative mg/dL    Urobilinogen Urine 0.2 0.2, 1.0 E.U./dL    Nitrite Urine Negative Negative    Leukocyte Esterase Urine Negative Negative    Narrative    Microscopic not indicated   CBC with platelets     Status: Abnormal   Result Value Ref Range    WBC Count 12.5 (H) 4.0 - 11.0 10e3/uL    RBC Count 3.90 3.80 - 5.20 10e6/uL    Hemoglobin 13.1 11.7 - 15.7 g/dL    Hematocrit 39.2 35.0 - 47.0 %     (H) 78 - 100 fL    MCH 33.6 (H) 26.5 - 33.0 pg    MCHC 33.4 31.5 - 36.5 g/dL    RDW 12.2 10.0 - 15.0 %    Platelet Count 391 150 - 450 10e3/uL   Comprehensive metabolic panel     Status: Abnormal   Result Value Ref Range    Sodium 139 133 - 144 mmol/L    Potassium 4.4 3.4 - 5.3 mmol/L    Chloride 103 94 - 109 mmol/L    Carbon Dioxide (CO2) 30 20 - 32 mmol/L    Anion Gap 6 3 - 14 mmol/L    Urea Nitrogen 11 7 - 30 mg/dL    Creatinine 0.60 0.52 - 1.04 mg/dL    Calcium 10.0 8.5 - 10.1 mg/dL    Glucose 110 (H) 70 - 99 mg/dL    Alkaline Phosphatase 91 40 - 150 U/L    AST 24 0 - 45 U/L    ALT 22 0 - 50 U/L    Protein Total 7.7 6.8 - 8.8 g/dL    Albumin 3.5 3.4 - 5.0 g/dL    Bilirubin Total 0.9 0.2 - 1.3 mg/dL    GFR Estimate >90 >60 mL/min/1.73m2   ESR: Erythrocyte sedimentation rate     Status: Normal   Result Value Ref Range    Erythrocyte Sedimentation Rate 30 0 - 30 mm/hr

## 2022-11-25 NOTE — TELEPHONE ENCOUNTER
PT has been sick for 3 weeks.  She was sick for 3 days, then a week later sick again with fever.  Yesterday, body aches. Temp=99.8  Cough for 3 days.  With deep breath, her chest hurts. Has non prod cough.  PT thinks she may have walking pneumonia.  PT thinks a viral illness may have turned into something bacterial. Odd, with the re-occurring fevers. Body aches put her to bed for 12 hours.  Negative covid tests during this time period.   PT is now out of home tests.    She needed to call in sick. Works as a NICU RN.    R- needs lung assessment today. PT will go to [facility] today.  HAWK Mendez

## 2022-11-25 NOTE — PATIENT INSTRUCTIONS
Start taking doxycycline as prescribed  Fluids and rest  We will call you if any of your pending labs are abnormal  Follow-up with PCP if recurrent fever persists

## 2022-11-26 LAB
B BURGDOR IGG+IGM SER QL: 0.03
SARS-COV-2 RNA RESP QL NAA+PROBE: NEGATIVE

## 2022-11-27 LAB — HIV 1+2 AB+HIV1 P24 AG SERPL QL IA: NONREACTIVE

## 2022-12-05 ENCOUNTER — MYC MEDICAL ADVICE (OUTPATIENT)
Dept: FAMILY MEDICINE | Facility: CLINIC | Age: 62
End: 2022-12-05

## 2022-12-06 ENCOUNTER — OFFICE VISIT (OUTPATIENT)
Dept: PEDIATRICS | Facility: CLINIC | Age: 62
End: 2022-12-06
Payer: COMMERCIAL

## 2022-12-06 VITALS
SYSTOLIC BLOOD PRESSURE: 90 MMHG | TEMPERATURE: 98.8 F | DIASTOLIC BLOOD PRESSURE: 60 MMHG | RESPIRATION RATE: 20 BRPM | HEART RATE: 99 BPM | OXYGEN SATURATION: 99 %

## 2022-12-06 DIAGNOSIS — J10.1 INFLUENZA A: Primary | ICD-10-CM

## 2022-12-06 DIAGNOSIS — R50.9 FEVER, UNSPECIFIED FEVER CAUSE: ICD-10-CM

## 2022-12-06 LAB
FLUAV AG SPEC QL IA: POSITIVE
FLUBV AG SPEC QL IA: NEGATIVE

## 2022-12-06 PROCEDURE — 99214 OFFICE O/P EST MOD 30 MIN: CPT | Performed by: PHYSICIAN ASSISTANT

## 2022-12-06 PROCEDURE — 87804 INFLUENZA ASSAY W/OPTIC: CPT | Performed by: PHYSICIAN ASSISTANT

## 2022-12-06 RX ORDER — OSELTAMIVIR PHOSPHATE 75 MG/1
75 CAPSULE ORAL 2 TIMES DAILY
Qty: 10 CAPSULE | Refills: 0 | Status: SHIPPED | OUTPATIENT
Start: 2022-12-06 | End: 2023-09-13

## 2022-12-06 ASSESSMENT — PATIENT HEALTH QUESTIONNAIRE - PHQ9
SUM OF ALL RESPONSES TO PHQ QUESTIONS 1-9: 0
10. IF YOU CHECKED OFF ANY PROBLEMS, HOW DIFFICULT HAVE THESE PROBLEMS MADE IT FOR YOU TO DO YOUR WORK, TAKE CARE OF THINGS AT HOME, OR GET ALONG WITH OTHER PEOPLE: NOT DIFFICULT AT ALL
SUM OF ALL RESPONSES TO PHQ QUESTIONS 1-9: 0

## 2022-12-06 ASSESSMENT — ENCOUNTER SYMPTOMS: COUGH: 1

## 2022-12-06 NOTE — LETTER
December 6, 2022      Kerri Timmons  [address] [place] [address]        To Whom It May Concern:    Kerri Timmons  was seen on 12/6/22.  Please excuse her 12/9/22 due to illness.        Sincerely,        Foster Timmons PA-C

## 2022-12-06 NOTE — PROGRESS NOTES
Assessment & Plan     Influenza A  Begin treatment as directed.   - oseltamivir (TAMIFLU) 75 MG capsule; Take 1 capsule (75 mg) by mouth 2 times daily    Fever, unspecified fever cause  - Influenza A & B Antigen - Clinic Collect    AGNES Mccollum [facility] MALACHI Manning is a 62 year old, presenting for the following health issues:  Cough      Cough    History of Present Illness       Reason for visit:  Recurring fever on antibiotcs,dry to now productive cough    She eats 2-3 servings of fruits and vegetables daily.She consumes 1 sweetened beverage(s) daily.She exercises with enough effort to increase her heart rate 10 to 19 minutes per day.  She exercises with enough effort to increase her heart rate 3 or less days per week. She is missing 2 dose(s) of medications per week.  She is not taking prescribed medications regularly due to remembering to take.    Today's PHQ-9         PHQ-9 Total Score: 0    PHQ-9 Q9 Thoughts of better off dead/self-harm past 2 weeks :   Not at all    How difficult have these problems made it for you to do your work, take care of things at home, or get along with other people: Not difficult at all       Acute Illness  Acute illness concerns: fever, cough   Onset/Duration: ongoing for 1 month   Symptoms:  Fever: YES-101 last night   Chills/Sweats: YES  Headache (location?): No  Sinus Pressure: No  Conjunctivitis:  No  Ear Pain: no  Rhinorrhea: No  Congestion: YES  Sore Throat: No  Cough: YES-non-productive, but becoming productive that is yellow/green   Wheeze: No, but short breath   Decreased Appetite: YES  Nausea: No  Vomiting: No  Diarrhea: YES- but has GI issues   Dysuria/Freq.: No  Dysuria or Hematuria: No  Fatigue/Achiness: YES  Sick/Strep Exposure: No  Therapies tried and outcome: antibiotic- not helping, taking OTC fever reducers   Review of Systems   Respiratory: Positive for cough.       Constitutional, HEENT, cardiovascular,  pulmonary, gi and gu systems are negative, except as otherwise noted.      Objective    BP 90/60 (BP Location: Right arm, Patient Position: Sitting, Cuff Size: Adult Regular)   Pulse 99   Temp 98.8  F (37.1  C) (Temporal)   Resp 20   SpO2 99%   There is no height or weight on file to calculate BMI.  Physical Exam   GENERAL: healthy, alert and no distress  EYES: Eyes grossly normal to inspection, PERRL and conjunctivae and sclerae normal  HENT: ear canals and TM's normal, nose and mouth without ulcers or lesions  NECK: no adenopathy, no asymmetry, masses, or scars and thyroid normal to palpation  RESP: lungs clear to auscultation - no rales, rhonchi or wheezes  CV: regular rate and rhythm, normal S1 S2, no S3 or S4, no murmur, click or rub, no peripheral edema and peripheral pulses strong  ABDOMEN: soft, nontender    Results for orders placed or performed in visit on 12/06/22   Influenza A & B Antigen - Clinic Collect     Status: Abnormal    Specimen: Nose; Swab   Result Value Ref Range    Influenza A antigen Positive (A) Negative    Influenza B antigen Negative Negative    Narrative    Test results must be correlated with clinical data. If necessary, results should be confirmed by a molecular assay or viral culture.

## 2022-12-07 NOTE — TELEPHONE ENCOUNTER
Writer responded via [employer].    FIDE UriarteN, RN-BC  [facility]

## 2022-12-13 ENCOUNTER — NURSE TRIAGE (OUTPATIENT)
Dept: NURSING | Facility: CLINIC | Age: 62
End: 2022-12-13

## 2022-12-13 NOTE — TELEPHONE ENCOUNTER
Reporting she has been sick on/off since nov 3rd.    Antibiotics for pneumonia 11-25-22, but then got a fever again and diagnosed with influenza A 12-6-22 and finished Tamiflu.    Last Sunday temp 100.8 and coughing now productive, coughing jags brining up chunks of green phlegm.  Patient does report lingering chest discomfort.     Disposition is to go to ER but patient denies the need for this and denies need for care advice.  She would like to make an appointment to see provider to rule out pneumonia in case she needs antibiotics again.  Transferred to schedulers.    Amy Bonilla RN  [facility]      Reason for Disposition    Chest pain  (Exception: MILD central chest pain, present only when coughing)    Additional Information    Negative: SEVERE difficulty breathing (e.g., struggling for each breath, speaks in single words)    Negative: Bluish (or gray) lips or face now    Negative: Shock suspected (e.g., cold/pale/clammy skin, too weak to stand, low BP, rapid pulse)    Negative: Sounds like a life-threatening emergency to the triager    Protocols used: INFLUENZA FOLLOW-UP CALL-A-

## 2023-01-09 ENCOUNTER — OFFICE VISIT (OUTPATIENT)
Dept: FAMILY MEDICINE | Facility: CLINIC | Age: 63
End: 2023-01-09
Payer: COMMERCIAL

## 2023-01-09 ENCOUNTER — ANCILLARY PROCEDURE (OUTPATIENT)
Dept: GENERAL RADIOLOGY | Facility: CLINIC | Age: 63
End: 2023-01-09
Attending: NURSE PRACTITIONER
Payer: COMMERCIAL

## 2023-01-09 VITALS
BODY MASS INDEX: 21.51 KG/M2 | SYSTOLIC BLOOD PRESSURE: 123 MMHG | RESPIRATION RATE: 16 BRPM | WEIGHT: 126 LBS | TEMPERATURE: 98.1 F | OXYGEN SATURATION: 95 % | HEIGHT: 64 IN | HEART RATE: 82 BPM | DIASTOLIC BLOOD PRESSURE: 84 MMHG

## 2023-01-09 DIAGNOSIS — R05.2 SUBACUTE COUGH: ICD-10-CM

## 2023-01-09 DIAGNOSIS — R05.2 SUBACUTE COUGH: Primary | ICD-10-CM

## 2023-01-09 DIAGNOSIS — J20.9 ACUTE BRONCHITIS, UNSPECIFIED ORGANISM: ICD-10-CM

## 2023-01-09 LAB
ANION GAP SERPL CALCULATED.3IONS-SCNC: 14 MMOL/L (ref 7–15)
BUN SERPL-MCNC: 13.9 MG/DL (ref 8–23)
CALCIUM SERPL-MCNC: 9.7 MG/DL (ref 8.8–10.2)
CHLORIDE SERPL-SCNC: 104 MMOL/L (ref 98–107)
CREAT SERPL-MCNC: 0.58 MG/DL (ref 0.51–0.95)
DEPRECATED HCO3 PLAS-SCNC: 24 MMOL/L (ref 22–29)
ERYTHROCYTE [DISTWIDTH] IN BLOOD BY AUTOMATED COUNT: 13.4 % (ref 10–15)
FLUAV AG SPEC QL IA: NEGATIVE
FLUBV AG SPEC QL IA: NEGATIVE
GFR SERPL CREATININE-BSD FRML MDRD: >90 ML/MIN/1.73M2
GLUCOSE SERPL-MCNC: 96 MG/DL (ref 70–99)
HCT VFR BLD AUTO: 38.4 % (ref 35–47)
HGB BLD-MCNC: 12.6 G/DL (ref 11.7–15.7)
MCH RBC QN AUTO: 32.3 PG (ref 26.5–33)
MCHC RBC AUTO-ENTMCNC: 32.8 G/DL (ref 31.5–36.5)
MCV RBC AUTO: 99 FL (ref 78–100)
PLATELET # BLD AUTO: 331 10E3/UL (ref 150–450)
POTASSIUM SERPL-SCNC: 4 MMOL/L (ref 3.4–5.3)
RBC # BLD AUTO: 3.9 10E6/UL (ref 3.8–5.2)
SODIUM SERPL-SCNC: 142 MMOL/L (ref 136–145)
WBC # BLD AUTO: 5.4 10E3/UL (ref 4–11)

## 2023-01-09 PROCEDURE — U0005 INFEC AGEN DETEC AMPLI PROBE: HCPCS | Performed by: NURSE PRACTITIONER

## 2023-01-09 PROCEDURE — 36415 COLL VENOUS BLD VENIPUNCTURE: CPT | Performed by: NURSE PRACTITIONER

## 2023-01-09 PROCEDURE — 80048 BASIC METABOLIC PNL TOTAL CA: CPT | Performed by: NURSE PRACTITIONER

## 2023-01-09 PROCEDURE — U0003 INFECTIOUS AGENT DETECTION BY NUCLEIC ACID (DNA OR RNA); SEVERE ACUTE RESPIRATORY SYNDROME CORONAVIRUS 2 (SARS-COV-2) (CORONAVIRUS DISEASE [COVID-19]), AMPLIFIED PROBE TECHNIQUE, MAKING USE OF HIGH THROUGHPUT TECHNOLOGIES AS DESCRIBED BY CMS-2020-01-R: HCPCS | Performed by: NURSE PRACTITIONER

## 2023-01-09 PROCEDURE — 87804 INFLUENZA ASSAY W/OPTIC: CPT | Performed by: NURSE PRACTITIONER

## 2023-01-09 PROCEDURE — 85027 COMPLETE CBC AUTOMATED: CPT | Performed by: NURSE PRACTITIONER

## 2023-01-09 PROCEDURE — 99214 OFFICE O/P EST MOD 30 MIN: CPT | Mod: CS | Performed by: NURSE PRACTITIONER

## 2023-01-09 PROCEDURE — 71046 X-RAY EXAM CHEST 2 VIEWS: CPT | Mod: TC | Performed by: RADIOLOGY

## 2023-01-09 RX ORDER — PREDNISONE 20 MG/1
TABLET ORAL
Qty: 20 TABLET | Refills: 0 | Status: SHIPPED | OUTPATIENT
Start: 2023-01-09 | End: 2023-01-21

## 2023-01-09 RX ORDER — ALBUTEROL SULFATE 90 UG/1
2 AEROSOL, METERED RESPIRATORY (INHALATION) EVERY 6 HOURS PRN
Qty: 18 G | Refills: 0 | Status: SHIPPED | OUTPATIENT
Start: 2023-01-09 | End: 2023-09-13

## 2023-01-09 ASSESSMENT — PAIN SCALES - GENERAL: PAINLEVEL: NO PAIN (0)

## 2023-01-09 ASSESSMENT — ENCOUNTER SYMPTOMS: COUGH: 1

## 2023-01-09 NOTE — LETTER
January 9, 2023      Kerri A Iain  [address] [place] [address]        To Whom It May Concern:    Kerri A Iain was seen on 1/9/2023.  Please excuse her  until 1/16/2023 due to illness.        Sincerely,        KELVIN Mosqueda CNP    
See HPI

## 2023-01-09 NOTE — PROGRESS NOTES
"  Assessment & Plan     Subacute cough  Acute bronchitis, unspecified organism  - XR Chest 2 Views  - Influenza A/B antigen  - Symptomatic COVID-19 Virus (Coronavirus) by PCR Nose  - CBC with platelets  - Basic metabolic panel  - CBC with platelets  - Basic metabolic panel  - predniSONE (DELTASONE) 20 MG tablet  Dispense: 20 tablet; Refill: 0  - albuterol (PROAIR HFA/PROVENTIL HFA/VENTOLIN HFA) 108 (90 Base) MCG/ACT inhaler  Dispense: 18 g; Refill: 0    No follow-ups on file.    KELVIN Mosqueda CNP  [facility]    Lupis Manning is a 62 year old, presenting for the following health issues:  Respiratory Problems, Cough (When breathing in Pts left chest hurts. PT stated that she wants an Xray. ), and Covid 19 Testing (Covid test)    Symptoms started 11/3 seen in UC treated w antibiotic; + flu 12/6   + productive cough, fatigue; congestion; denies sinus pressure;  left sided chest pain with deep breath; negative covid 11/25; negative home antigen   taking TheraFlu;   covid vaccinated up to current bivalent booster;     Cough    History of Present Illness       Reason for visit:  Respiratory Issuess    She eats 2-3 servings of fruits and vegetables daily.She consumes 0 sweetened beverage(s) daily.She exercises with enough effort to increase her heart rate 10 to 19 minutes per day.  She exercises with enough effort to increase her heart rate 3 or less days per week.   She is taking medications regularly.             Review of Systems   Respiratory: Positive for cough.             Objective    /84 (BP Location: Right arm, Patient Position: Sitting, Cuff Size: Adult Regular)   Pulse 82   Temp 98.1  F (36.7  C) (Tympanic)   Resp 16   Ht 1.613 m (5' 3.5\")   Wt 57.2 kg (126 lb)   SpO2 95%   BMI 21.97 kg/m    Body mass index is 21.97 kg/m .  Physical Exam   GENERAL: healthy, alert and no distress  NECK: no adenopathy, no asymmetry, masses, or scars and thyroid normal to " palpation  RESP: RLL diminished;  no rales, rhonchi or wheezes      Results for orders placed or performed in visit on 01/09/23   XR Chest 2 Views     Status: None    Narrative    XR CHEST 2 VIEWS   1/9/2023 3:48 PM     HISTORY: ongoing cough since 11/2022 no improvement;; RLL dimished;  Subacute cough    COMPARISON: Chest radiograph 11/25/2022.      Impression    IMPRESSION: No acute cardiopulmonary disease.    INES LEACH MD         SYSTEM ID:  [id number]   Results for orders placed or performed in visit on 01/09/23   Symptomatic COVID-19 Virus (Coronavirus) by PCR Nose     Status: Normal    Specimen: Nose; Swab   Result Value Ref Range    SARS CoV2 PCR Negative Negative    Narrative    Testing was performed using the jason SARS-CoV-2 assay on the jason  qunb0 System. This test should be ordered for the detection of  SARS-CoV-2 in individuals who meet SARS-CoV-2 clinical and/or  epidemiological criteria. Test performance is unknown in asymptomatic  patients. This test is for in vitro diagnostic use under the FDA EUA  for laboratories certified under CLIA to perform high and/or moderate  complexity testing. This test has not been FDA cleared or approved. A  negative result does not rule out the presence of PCR inhibitors in  the specimen or target RNA in concentration below the limit of  detection for the assay. The possibility of a false negative should  be considered if the patient's recent exposure or clinical  presentation suggests COVID-19. This test was validated by the [facility]. This  laboratory is certified under the Clinical Laboratory Improvement  Amendments of 1988 (CLIA-88) as qualified to perform high and/or  moderate complexity laboratory testing.   CBC with platelets     Status: Normal   Result Value Ref Range    WBC Count 5.4 4.0 - 11.0 10e3/uL    RBC Count 3.90 3.80 - 5.20 10e6/uL    Hemoglobin 12.6 11.7 - 15.7 g/dL    Hematocrit 38.4 35.0 -  47.0 %    MCV 99 78 - 100 fL    MCH 32.3 26.5 - 33.0 pg    MCHC 32.8 31.5 - 36.5 g/dL    RDW 13.4 10.0 - 15.0 %    Platelet Count 331 150 - 450 10e3/uL   Basic metabolic panel     Status: Normal   Result Value Ref Range    Sodium 142 136 - 145 mmol/L    Potassium 4.0 3.4 - 5.3 mmol/L    Chloride 104 98 - 107 mmol/L    Carbon Dioxide (CO2) 24 22 - 29 mmol/L    Anion Gap 14 7 - 15 mmol/L    Urea Nitrogen 13.9 8.0 - 23.0 mg/dL    Creatinine 0.58 0.51 - 0.95 mg/dL    Calcium 9.7 8.8 - 10.2 mg/dL    Glucose 96 70 - 99 mg/dL    GFR Estimate >90 >60 mL/min/1.73m2   Influenza A/B antigen     Status: Normal    Specimen: Nose; Swab   Result Value Ref Range    Influenza A antigen Negative Negative    Influenza B antigen Negative Negative    Narrative    Test results must be correlated with clinical data. If necessary, results should be confirmed by a molecular assay or viral culture.

## 2023-01-10 LAB — SARS-COV-2 RNA RESP QL NAA+PROBE: NEGATIVE

## 2023-01-10 NOTE — RESULT ENCOUNTER NOTE
Saturnino Manning,    Your recent results are normal. Any results slightly above or below the normal range have been evaluated as clinically stable.     Let me know if you have any questions or concerns.    Sincerely,  KELVIN Mosqueda CNP

## 2023-02-23 ENCOUNTER — APPOINTMENT (OUTPATIENT)
Dept: CT IMAGING | Facility: CLINIC | Age: 63
End: 2023-02-23
Attending: EMERGENCY MEDICINE
Payer: COMMERCIAL

## 2023-02-23 ENCOUNTER — HOSPITAL ENCOUNTER (EMERGENCY)
Facility: CLINIC | Age: 63
Discharge: HOME | End: 2023-02-23
Attending: EMERGENCY MEDICINE | Admitting: EMERGENCY MEDICINE
Payer: COMMERCIAL

## 2023-02-23 VITALS
TEMPERATURE: 97.6 F | SYSTOLIC BLOOD PRESSURE: 149 MMHG | OXYGEN SATURATION: 99 % | DIASTOLIC BLOOD PRESSURE: 100 MMHG | HEART RATE: 65 BPM | RESPIRATION RATE: 16 BRPM

## 2023-02-23 DIAGNOSIS — K52.9 NON-SPECIFIC COLITIS: ICD-10-CM

## 2023-02-23 DIAGNOSIS — K92.1 HEMATOCHEZIA: ICD-10-CM

## 2023-02-23 LAB
ALBUMIN SERPL BCG-MCNC: 4.3 G/DL (ref 3.5–5.2)
ALBUMIN UR-MCNC: NEGATIVE MG/DL
ALP SERPL-CCNC: 89 U/L (ref 35–104)
ALT SERPL W P-5'-P-CCNC: 25 U/L (ref 10–35)
ANION GAP SERPL CALCULATED.3IONS-SCNC: 9 MMOL/L (ref 7–15)
APPEARANCE UR: CLEAR
AST SERPL W P-5'-P-CCNC: 27 U/L (ref 10–35)
BASOPHILS # BLD AUTO: 0 10E3/UL (ref 0–0.2)
BASOPHILS NFR BLD AUTO: 0 %
BILIRUB SERPL-MCNC: 0.4 MG/DL
BILIRUB UR QL STRIP: NEGATIVE
BUN SERPL-MCNC: 16.1 MG/DL (ref 8–23)
C COLI+JEJUNI+LARI FUSA STL QL NAA+PROBE: NOT DETECTED
CALCIUM SERPL-MCNC: 10 MG/DL (ref 8.8–10.2)
CHLORIDE SERPL-SCNC: 99 MMOL/L (ref 98–107)
COLOR UR AUTO: NORMAL
CREAT SERPL-MCNC: 0.5 MG/DL (ref 0.51–0.95)
CRP SERPL-MCNC: <3 MG/L
DEPRECATED HCO3 PLAS-SCNC: 26 MMOL/L (ref 22–29)
EC STX1 GENE STL QL NAA+PROBE: NOT DETECTED
EC STX2 GENE STL QL NAA+PROBE: NOT DETECTED
EOSINOPHIL # BLD AUTO: 0.2 10E3/UL (ref 0–0.7)
EOSINOPHIL NFR BLD AUTO: 2 %
ERYTHROCYTE [DISTWIDTH] IN BLOOD BY AUTOMATED COUNT: 13 % (ref 10–15)
GFR SERPL CREATININE-BSD FRML MDRD: >90 ML/MIN/1.73M2
GLUCOSE SERPL-MCNC: 111 MG/DL (ref 70–99)
GLUCOSE UR STRIP-MCNC: NEGATIVE MG/DL
HCT VFR BLD AUTO: 40.1 % (ref 35–47)
HGB BLD-MCNC: 13.6 G/DL (ref 11.7–15.7)
HGB UR QL STRIP: NEGATIVE
IMM GRANULOCYTES # BLD: 0 10E3/UL
IMM GRANULOCYTES NFR BLD: 0 %
KETONES UR STRIP-MCNC: NEGATIVE MG/DL
LACTATE SERPL-SCNC: 0.5 MMOL/L (ref 0.7–2)
LEUKOCYTE ESTERASE UR QL STRIP: NEGATIVE
LYMPHOCYTES # BLD AUTO: 1 10E3/UL (ref 0.8–5.3)
LYMPHOCYTES NFR BLD AUTO: 10 %
MCH RBC QN AUTO: 33 PG (ref 26.5–33)
MCHC RBC AUTO-ENTMCNC: 33.9 G/DL (ref 31.5–36.5)
MCV RBC AUTO: 97 FL (ref 78–100)
MONOCYTES # BLD AUTO: 0.5 10E3/UL (ref 0–1.3)
MONOCYTES NFR BLD AUTO: 5 %
NEUTROPHILS # BLD AUTO: 8.6 10E3/UL (ref 1.6–8.3)
NEUTROPHILS NFR BLD AUTO: 83 %
NITRATE UR QL: NEGATIVE
NOROV GI+II ORF1-ORF2 JNC STL QL NAA+PR: NOT DETECTED
NRBC # BLD AUTO: 0 10E3/UL
NRBC BLD AUTO-RTO: 0 /100
PH UR STRIP: 6.5 [PH] (ref 5–7)
PLATELET # BLD AUTO: 303 10E3/UL (ref 150–450)
POTASSIUM SERPL-SCNC: 4 MMOL/L (ref 3.4–5.3)
PROT SERPL-MCNC: 7.4 G/DL (ref 6.4–8.3)
RBC # BLD AUTO: 4.12 10E6/UL (ref 3.8–5.2)
RBC URINE: 1 /HPF
RVA NSP5 STL QL NAA+PROBE: NOT DETECTED
SALMONELLA SP RPOD STL QL NAA+PROBE: NOT DETECTED
SHIGELLA SP+EIEC IPAH STL QL NAA+PROBE: NOT DETECTED
SODIUM SERPL-SCNC: 134 MMOL/L (ref 136–145)
SP GR UR STRIP: 1.03 (ref 1–1.03)
UROBILINOGEN UR STRIP-MCNC: NORMAL MG/DL
V CHOL+PARA RFBL+TRKH+TNAA STL QL NAA+PR: NOT DETECTED
WBC # BLD AUTO: 10.3 10E3/UL (ref 4–11)
WBC URINE: <1 /HPF
Y ENTERO RECN STL QL NAA+PROBE: NOT DETECTED

## 2023-02-23 PROCEDURE — 258N000003 HC RX IP 258 OP 636: Performed by: EMERGENCY MEDICINE

## 2023-02-23 PROCEDURE — 93010 ELECTROCARDIOGRAM REPORT: CPT | Performed by: EMERGENCY MEDICINE

## 2023-02-23 PROCEDURE — 80053 COMPREHEN METABOLIC PANEL: CPT | Performed by: EMERGENCY MEDICINE

## 2023-02-23 PROCEDURE — 96361 HYDRATE IV INFUSION ADD-ON: CPT | Performed by: EMERGENCY MEDICINE

## 2023-02-23 PROCEDURE — 250N000011 HC RX IP 250 OP 636: Performed by: EMERGENCY MEDICINE

## 2023-02-23 PROCEDURE — 85004 AUTOMATED DIFF WBC COUNT: CPT | Performed by: EMERGENCY MEDICINE

## 2023-02-23 PROCEDURE — 83605 ASSAY OF LACTIC ACID: CPT | Performed by: EMERGENCY MEDICINE

## 2023-02-23 PROCEDURE — 250N000009 HC RX 250: Performed by: EMERGENCY MEDICINE

## 2023-02-23 PROCEDURE — 86140 C-REACTIVE PROTEIN: CPT | Performed by: EMERGENCY MEDICINE

## 2023-02-23 PROCEDURE — 87506 IADNA-DNA/RNA PROBE TQ 6-11: CPT | Performed by: EMERGENCY MEDICINE

## 2023-02-23 PROCEDURE — 93005 ELECTROCARDIOGRAM TRACING: CPT | Performed by: EMERGENCY MEDICINE

## 2023-02-23 PROCEDURE — 74177 CT ABD & PELVIS W/CONTRAST: CPT

## 2023-02-23 PROCEDURE — 36415 COLL VENOUS BLD VENIPUNCTURE: CPT | Performed by: EMERGENCY MEDICINE

## 2023-02-23 PROCEDURE — 99285 EMERGENCY DEPT VISIT HI MDM: CPT | Mod: 25 | Performed by: EMERGENCY MEDICINE

## 2023-02-23 PROCEDURE — 81001 URINALYSIS AUTO W/SCOPE: CPT | Performed by: EMERGENCY MEDICINE

## 2023-02-23 PROCEDURE — 96360 HYDRATION IV INFUSION INIT: CPT | Mod: 59 | Performed by: EMERGENCY MEDICINE

## 2023-02-23 RX ORDER — IOPAMIDOL 755 MG/ML
100 INJECTION, SOLUTION INTRAVASCULAR ONCE
Status: COMPLETED | OUTPATIENT
Start: 2023-02-23 | End: 2023-02-23

## 2023-02-23 RX ORDER — SODIUM CHLORIDE 9 MG/ML
INJECTION, SOLUTION INTRAVENOUS CONTINUOUS
Status: DISCONTINUED | OUTPATIENT
Start: 2023-02-23 | End: 2023-02-23 | Stop reason: HOSPADM

## 2023-02-23 RX ORDER — CIPROFLOXACIN 500 MG/1
500 TABLET, FILM COATED ORAL 2 TIMES DAILY
Qty: 20 TABLET | Refills: 0 | Status: SHIPPED | OUTPATIENT
Start: 2023-02-23 | End: 2023-03-05

## 2023-02-23 RX ORDER — METRONIDAZOLE 500 MG/1
500 TABLET ORAL 2 TIMES DAILY
Qty: 20 TABLET | Refills: 0 | Status: SHIPPED | OUTPATIENT
Start: 2023-02-23 | End: 2023-03-05

## 2023-02-23 RX ORDER — OXYCODONE HYDROCHLORIDE 5 MG/1
5 TABLET ORAL EVERY 6 HOURS PRN
Qty: 5 TABLET | Refills: 0 | Status: SHIPPED | OUTPATIENT
Start: 2023-02-23 | End: 2023-02-26

## 2023-02-23 RX ADMIN — SODIUM CHLORIDE 1000 ML: 9 INJECTION, SOLUTION INTRAVENOUS at 09:49

## 2023-02-23 RX ADMIN — SODIUM CHLORIDE 30 ML: 9 INJECTION, SOLUTION INTRAVENOUS at 10:15

## 2023-02-23 RX ADMIN — IOPAMIDOL 62 ML: 755 INJECTION, SOLUTION INTRAVENOUS at 10:13

## 2023-02-23 ASSESSMENT — ACTIVITIES OF DAILY LIVING (ADL)
ADLS_ACUITY_SCORE: 35
ADLS_ACUITY_SCORE: 35
ADLS_ACUITY_SCORE: 33

## 2023-02-23 NOTE — ED TRIAGE NOTES
This AM at work missy blood stools, ABD pain yesterday with cramping     Triage Assessment     Row Name 02/23/23 0806       Triage Assessment (Adult)    Airway WDL WDL       Respiratory WDL    Respiratory WDL WDL       Skin Circulation/Temperature WDL    Skin Circulation/Temperature WDL WDL       Cardiac WDL    Cardiac WDL WDL       Peripheral/Neurovascular WDL    Peripheral Neurovascular WDL WDL       Cognitive/Neuro/Behavioral WDL    Cognitive/Neuro/Behavioral WDL WDL

## 2023-02-23 NOTE — H&P (VIEW-ONLY)
[facility] [place] EMERGENCY DEPARTMENT ([facility])    2/23/23      ED PROVIDER NOTE  ED 5  9:12 AM   History     Chief Complaint   Patient presents with     Abdominal Pain     This AM at work missy blood stools, ABD pain yesterday with cramping. H/O IB     The history is provided by the patient and medical records.     Kerri Timmons is a 62 year old female with history of diverticulosis who presents with crampy abdominal pain that started last night  This crampy abdominal pain woke her up from sleep, similar to the cramps associated with a period, but has gone through menopause already. 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 Took Tums without much relief. She went to the bathroom and passed maroon red blood per rectum without of any stool content, just liquid. She continued to have diffuse abdominal discomfort throughout this morning.  She had another bowel movement with passage of similar maroon red blood at bottom of bowl without any stool content.  She states that it just looked like a large pile of maroon-colored blood at the bottom of the bowl. When she wiped she noticed bright red blood on toilet tissue.  This was concerning to patient and so she came down to the ED for further evaluation.  She notes prior history of hemorroidectomy after 2nd child but has no active hemorrhoids to her knowledge. The worst symptom at this time is the persistent abdominal cramping that has been ongoing all night. Has had loose stools ever since her cholecystectomy and has loose stools at baseline but this maroon blood per rectum is new for her.  She notes that her blood pressure is elevated here, notes that she forgot to take hypertension medications last night.  She did take them this morning. No dysuria.  She is not on anticoagulation.     Past Medical History  Past Medical History:   Diagnosis Date     ASCUS on Pap smear 11/30/2010    colp wnl     Cataract,  right eye 06/17/2017     Depressive disorder      Diabetes (H)     h/o gestational     Hypertension      SVT (supraventricular tachycardia) (H) 2008     Past Surgical History:   Procedure Laterality Date     APPENDECTOMY       CATARACT IOL, RT/LT Right 08/24/2018     CHOLECYSTECTOMY       COLONOSCOPY N/A 7/29/2021    Procedure: COLONOSCOPY, WITH POLYPECTOMY AND BIOPSY;  Surgeon: Po Constantino MD;  Location:  GI     PHACOEMULSIFICATION WITH STANDARD INTRAOCULAR LENS IMPLANT Right 8/22/2018    Procedure: PHACOEMULSIFICATION WITH STANDARD INTRAOCULAR LENS IMPLANT;  Right Eye Phacoemulsification with Standard Intraocular Lens;  Surgeon: Johny Daniels MD;  Location: [facility] OR     albuterol (PROAIR HFA/PROVENTIL HFA/VENTOLIN HFA) 108 (90 Base) MCG/ACT inhaler  cholestyramine (QUESTRAN) 4 GM/DOSE powder  ciprofloxacin (CIPRO) 500 MG tablet  clotrimazole (LOTRIMIN) 1 % external cream  DULoxetine (CYMBALTA) 60 MG capsule  losartan (COZAAR) 100 MG tablet  metroNIDAZOLE (FLAGYL) 500 MG tablet  MULTI-VITAMIN OR TABS  mupirocin (BACTROBAN) 2 % external ointment  nicotine (NICORETTE) 2 MG gum  oseltamivir (TAMIFLU) 75 MG capsule  oxyCODONE (ROXICODONE) 5 MG tablet  penciclovir (DENAVIR) 1 % external cream  valACYclovir (VALTREX) 1000 mg tablet  valACYclovir (VALTREX) 500 MG tablet  verapamil ER (CALAN-SR) 240 MG CR tablet      Allergies   Allergen Reactions     No Known Drug Allergies      Family History  Family History   Problem Relation Age of Onset     Skin Cancer Mother      C.A.D. Father         early 60's     Hypertension Father      Hypertension Sister      Alzheimer Disease Brother      Glaucoma No family hx of      Macular Degeneration No family hx of      Retinal detachment No family hx of      Amblyopia No family hx of      Social History   Social History     Tobacco Use     Smoking status: Light Smoker     Packs/day: 0.00     Years: 0.00     Pack years: 0.00     Types: Cigarettes     Smokeless tobacco: Never      Tobacco comments:     3 per week    Vaping Use     Vaping Use: Never used   Substance Use Topics     Alcohol use: Yes     Comment: a couple drinks 3 times per week      Drug use: No         A medically appropriate review of systems was performed with pertinent positives and negatives noted in the HPI, and all other systems negative.    Physical Exam   BP: (!) 141/103  Pulse: 77  Temp: 97.6  F (36.4  C)  Resp: 18  SpO2: 99 %  Physical Exam  Vitals and nursing note reviewed.   Constitutional:       General: She is not in acute distress.     Appearance: Normal appearance. She is well-developed. She is not ill-appearing, toxic-appearing or diaphoretic.   HENT:      Head: Normocephalic and atraumatic.      Nose: Nose normal.   Eyes:      General: No scleral icterus.     Conjunctiva/sclera: Conjunctivae normal.   Cardiovascular:      Rate and Rhythm: Normal rate.   Pulmonary:      Effort: Pulmonary effort is normal. No respiratory distress.      Breath sounds: No stridor.   Abdominal:      General: There is no distension.      Palpations: Abdomen is soft. There is no mass.      Tenderness: There is abdominal tenderness in the right lower quadrant, suprapubic area and left lower quadrant. There is no guarding or rebound.      Hernia: No hernia is present.   Genitourinary:     Comments: Declined rectal exam  Musculoskeletal:         General: No deformity. Normal range of motion.      Cervical back: Normal range of motion and neck supple. No rigidity.   Skin:     General: Skin is warm and dry.      Coloration: Skin is not jaundiced or pale.      Findings: No erythema.   Neurological:      General: No focal deficit present.      Mental Status: She is alert and oriented to person, place, and time.   Psychiatric:         Mood and Affect: Mood normal.         Behavior: Behavior normal.           ED Course, Procedures, & Data     ED Course as of 02/23/23 1927   u Feb 23, 2023   1046      EKG Interpretation:     Interpreted  by Joann Ramirez MD  Time reviewed:1045   Symptoms at time of EKG: abd pain   Rhythm: Normal sinus   Rate: Normal  Axis: Left Axis Deviation  Ectopy: None  Conduction: Normal  ST Segments/ T Waves: No ST-T wave changes and No acute ischemic changes  Q Waves: None    Clinical Impression: non-specific EKG         Procedures              CT ABDOMEN AND PELVIS WITH CONTRAST   2/26/2019 11:41 AM      HISTORY: Abdominal pain, unspecified. Chronic left upper quadrant  pain. Left upper quadrant abdominal pain.     TECHNIQUE: CT of the abdomen and pelvis was performed following the  administration of 73 mL Isovue-370. Radiation dose for this scan was  reduced using automated exposure control, adjustment of the mA and/or  kV according to patient size, or iterative reconstruction technique.     COMPARISON: None.     FINDINGS: There are a few scattered colonic diverticuli. No definite  evidence for acute diverticulitis. The bowel otherwise appears  unremarkable.     The solid organs in the abdomen appear unremarkable. Minimal bibasilar  atelectasis in the lungs.                                                                      IMPRESSION: Few scattered colonic diverticuli. No definite acute  abnormality.         COLONOSCOPY 7/29/2021  Findings:        The perianal and digital rectal examinations were normal. Pertinent        negatives include normal sphincter tone and no palpable rectal lesions.        The terminal ileum appeared normal. Biopsies were taken with a cold        forceps for histology.        The colon (entire examined portion) appeared normal. Biopsies for        histology were taken with a cold forceps from the entire colon for        evaluation of microscopic colitis.        Many medium-mouthed diverticula were found in the sigmoid colon; a few        containing fecaliths.        The exam was otherwise without abnormality on direct and retroflexion        views.   Impression:                 - The examined  portion of the ileum was normal. Biopsied.   - The entire examined colon is normal. Biopsied.   - Diverticulosis in the sigmoid colon.   - The examination was otherwise normal on direct and retroflexion views.      Results for orders placed or performed during the hospital encounter of 02/23/23   CT Abdomen Pelvis w Contrast     Status: None    Narrative    CT ABDOMEN AND PELVIS WITH CONTRAST 2/23/2023 10:16 AM    CLINICAL HISTORY: Abdominal pain. Lower abdominal cramping, maroon  stool, evaluate diverticular bleed, diverticulitis, colitis.    TECHNIQUE: CT scan of the abdomen and pelvis was performed following  injection of IV contrast. Multiplanar reformats were obtained. Dose  reduction techniques were used.  CONTRAST: 62mL Isovue 370    COMPARISON: February 26, 2019    FINDINGS:   LOWER CHEST: Trace atelectasis and/or fibrosis. No consolidation or  effusion.    HEPATOBILIARY: No significant mass or bile duct dilatation.  Cholecystectomy.     PANCREAS: No significant mass, duct dilatation, or inflammatory  change.    SPLEEN: Normal size.    ADRENAL GLANDS: No significant nodules.    KIDNEYS/BLADDER: No significant mass, stones, or hydronephrosis.    BOWEL: Inflammation of the colon from the hepatic flexure through the  distal descending colon, compatible with a nonspecific mild-moderate  uncomplicated colitis. No free air or abscess. Mild diverticulosis  without diverticulitis.    PELVIC ORGANS: No pelvic masses.    ADDITIONAL FINDINGS: There are minimal atherosclerotic changes of the  visualized aorta and its branches. There is no evidence of aortic  dissection or aneurysm. No free fluid.    MUSCULOSKELETAL: No frankly destructive bony lesions.      Impression    IMPRESSION:   1.  Left-sided colitis. This is an appropriate distribution for  ischemic colitis, although there is only mild scattered  atherosclerotic changes of the aorta and its branches and the celiac  axis and SMA are widely patent. NEENA is patent  as well. Differential  also includes infectious or inflammatory colitis. Consider correlation  with lactate levels.  2.  Mild diverticulosis without diverticulitis.    MARY ALVARADO MD         SYSTEM ID:  [id number]   Comprehensive metabolic panel     Status: Abnormal   Result Value Ref Range    Sodium 134 (L) 136 - 145 mmol/L    Potassium 4.0 3.4 - 5.3 mmol/L    Chloride 99 98 - 107 mmol/L    Carbon Dioxide (CO2) 26 22 - 29 mmol/L    Anion Gap 9 7 - 15 mmol/L    Urea Nitrogen 16.1 8.0 - 23.0 mg/dL    Creatinine 0.50 (L) 0.51 - 0.95 mg/dL    Calcium 10.0 8.8 - 10.2 mg/dL    Glucose 111 (H) 70 - 99 mg/dL    Alkaline Phosphatase 89 35 - 104 U/L    AST 27 10 - 35 U/L    ALT 25 10 - 35 U/L    Protein Total 7.4 6.4 - 8.3 g/dL    Albumin 4.3 3.5 - 5.2 g/dL    Bilirubin Total 0.4 <=1.2 mg/dL    GFR Estimate >90 >60 mL/min/1.73m2   CBC with platelets and differential     Status: Abnormal   Result Value Ref Range    WBC Count 10.3 4.0 - 11.0 10e3/uL    RBC Count 4.12 3.80 - 5.20 10e6/uL    Hemoglobin 13.6 11.7 - 15.7 g/dL    Hematocrit 40.1 35.0 - 47.0 %    MCV 97 78 - 100 fL    MCH 33.0 26.5 - 33.0 pg    MCHC 33.9 31.5 - 36.5 g/dL    RDW 13.0 10.0 - 15.0 %    Platelet Count 303 150 - 450 10e3/uL    % Neutrophils 83 %    % Lymphocytes 10 %    % Monocytes 5 %    % Eosinophils 2 %    % Basophils 0 %    % Immature Granulocytes 0 %    NRBCs per 100 WBC 0 <1 /100    Absolute Neutrophils 8.6 (H) 1.6 - 8.3 10e3/uL    Absolute Lymphocytes 1.0 0.8 - 5.3 10e3/uL    Absolute Monocytes 0.5 0.0 - 1.3 10e3/uL    Absolute Eosinophils 0.2 0.0 - 0.7 10e3/uL    Absolute Basophils 0.0 0.0 - 0.2 10e3/uL    Absolute Immature Granulocytes 0.0 <=0.4 10e3/uL    Absolute NRBCs 0.0 10e3/uL   Lactic acid whole blood     Status: Abnormal   Result Value Ref Range    Lactic Acid 0.5 (L) 0.7 - 2.0 mmol/L   CRP inflammation     Status: Normal   Result Value Ref Range    CRP Inflammation <3.00 <5.00 mg/L   UA with Microscopic reflex to Culture     Status:  Normal    Specimen: Urine, Clean Catch   Result Value Ref Range    Color Urine Straw Colorless, Straw, Light Yellow, Yellow    Appearance Urine Clear Clear    Glucose Urine Negative Negative mg/dL    Bilirubin Urine Negative Negative    Ketones Urine Negative Negative mg/dL    Specific Gravity Urine 1.031 1.003 - 1.035    Blood Urine Negative Negative    pH Urine 6.5 5.0 - 7.0    Protein Albumin Urine Negative Negative mg/dL    Urobilinogen Urine Normal Normal, 2.0 mg/dL    Nitrite Urine Negative Negative    Leukocyte Esterase Urine Negative Negative    RBC Urine 1 <=2 /HPF    WBC Urine <1 <=5 /HPF    Narrative    Urine Culture not indicated   CBC with platelets differential     Status: Abnormal    Narrative    The following orders were created for panel order CBC with platelets differential.  Procedure                               Abnormality         Status                     ---------                               -----------         ------                     CBC with platelets and d...[[id number]]  Abnormal            Final result                 Please view results for these tests on the individual orders.     Medications   0.9% sodium chloride BOLUS (0 mLs Intravenous Stopped 2/23/23 1135)   iopamidol (ISOVUE-370) solution 100 mL (62 mLs Intravenous $Given 2/23/23 1013)   sodium chloride 0.9 % bag 100mL (30 mLs Intravenous $Given 2/23/23 1015)     Labs Ordered and Resulted from Time of ED Arrival to Time of ED Departure   COMPREHENSIVE METABOLIC PANEL - Abnormal       Result Value    Sodium 134 (*)     Potassium 4.0      Chloride 99      Carbon Dioxide (CO2) 26      Anion Gap 9      Urea Nitrogen 16.1      Creatinine 0.50 (*)     Calcium 10.0      Glucose 111 (*)     Alkaline Phosphatase 89      AST 27      ALT 25      Protein Total 7.4      Albumin 4.3      Bilirubin Total 0.4      GFR Estimate >90     CBC WITH PLATELETS AND DIFFERENTIAL - Abnormal    WBC Count 10.3      RBC Count 4.12      Hemoglobin 13.6       Hematocrit 40.1      MCV 97      MCH 33.0      MCHC 33.9      RDW 13.0      Platelet Count 303      % Neutrophils 83      % Lymphocytes 10      % Monocytes 5      % Eosinophils 2      % Basophils 0      % Immature Granulocytes 0      NRBCs per 100 WBC 0      Absolute Neutrophils 8.6 (*)     Absolute Lymphocytes 1.0      Absolute Monocytes 0.5      Absolute Eosinophils 0.2      Absolute Basophils 0.0      Absolute Immature Granulocytes 0.0      Absolute NRBCs 0.0     LACTIC ACID WHOLE BLOOD - Abnormal    Lactic Acid 0.5 (*)    CRP INFLAMMATION - Normal    CRP Inflammation <3.00     ROUTINE UA WITH MICROSCOPIC REFLEX TO CULTURE - Normal    Color Urine Straw      Appearance Urine Clear      Glucose Urine Negative      Bilirubin Urine Negative      Ketones Urine Negative      Specific Gravity Urine 1.031      Blood Urine Negative      pH Urine 6.5      Protein Albumin Urine Negative      Urobilinogen Urine Normal      Nitrite Urine Negative      Leukocyte Esterase Urine Negative      RBC Urine 1      WBC Urine <1     ENTERIC BACTERIA AND VIRUS PANEL BY OSORIO STOOL     CT Abdomen Pelvis w Contrast   Final Result   IMPRESSION:    1.  Left-sided colitis. This is an appropriate distribution for   ischemic colitis, although there is only mild scattered   atherosclerotic changes of the aorta and its branches and the celiac   axis and SMA are widely patent. NEENA is patent as well. Differential   also includes infectious or inflammatory colitis. Consider correlation   with lactate levels.   2.  Mild diverticulosis without diverticulitis.      MARY ALVARADO MD            SYSTEM ID:  [id number]                 Medical Decision Making  The patient's presentation was of moderate complexity (an undiagnosed new problem with uncertain diagnosis).    The patient's evaluation involved:  ordering and/or review of 3+ test(s) in this encounter (see separate area of note for details)  review of 3+ test result(s) ordered prior to this  encounter (see separate area of note for details)  discussion of management or test interpretation with another health professional (see separate area of note for details)    The patient's management necessitated moderate risk (prescription drug management including medications given in the ED) and high risk (a decision regarding hospitalization).      Assessment & Plan    Kerri Timmons is a 62 year old female with history of diverticulosis who presents with crampy abdominal pain that started last night      Ddx: diverticular bleed, diverticulitis, acute blood loss anemia, internal hemorrhoids, colitis    Patient declines pain medication. Declines rectal exam. 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 No rectal pain so doubt significant bleeding anal fissure. Given IVF.  Declined pain medication.  Urinalysis normal.  CMP normal.  CBC with normal white count and hemoglobin of 13.6.  Normal platelets.  CRP normal.  CT showed left sided colitis.  Appropriate distribution for ischemic colitis although celiac axis and SMA are widely patent.  NEENA is patent.  Differential includes infectious versus inflammatory colitis. Mild diverticulosis without diverticulitis.  Added on EKG and lactate for further work-up.  Lactate normal.  EKG with normal sinus rhythm.  Pt did end up having a small volume bloody liquid stool with some solid debris in it.  Sent a stool culture.  Discussed CT with radiologist who said the mesenteric vasculature was widely patent.  He did not think a CT angiogram was indicated since the patient's lactate was normal as he had a very low suspicion for any missed arterial occlusion.  On reassessment, patient quite anxious about the bloody stool and did have some pain prior to passing the stool but she had very minimal volume of bloody output and this is an expected course.  I offered an observation admission for pain control and monitoring  but the patient preferred to be discharged home.  We discussed ongoing management with oral antibiotics.  I prescribed ciprofloxacin and Flagyl.  Patient advised to follow-up with her primary care provider for recheck hemoglobin if she continues to have bloody stools and to reassess response to therapy.  I suspect patient may have segmental colitis associated with diverticulosis or SCAD as she has a longstanding history of diarrhea.  She takes Imodium consistently.  I did encourage her to discontinue the Imodium until resolution of her current symptoms.  I gave her a formal referral to GI and for colonoscopy.  Also give a small prescription of oxycodone.  Detailed return precautions provided.    I have reviewed the nursing notes. I have reviewed the findings, diagnosis, plan and need for follow up with the patient.    Discharge Medication List as of 2/23/2023  2:06 PM      START taking these medications    Details   ciprofloxacin (CIPRO) 500 MG tablet Take 1 tablet (500 mg) by mouth 2 times daily for 10 days, Disp-20 tablet, R-0, E-Prescribe      metroNIDAZOLE (FLAGYL) 500 MG tablet Take 1 tablet (500 mg) by mouth 2 times daily for 10 days, Disp-20 tablet, R-0, E-PrescribeEat yogurt or cottage cheese daily to prevent diarrhea that can be caused by taking this antibiotic.      oxyCODONE (ROXICODONE) 5 MG tablet Take 1 tablet (5 mg) by mouth every 6 hours as needed for pain, Disp-5 tablet, R-0, E-Prescribe             Final diagnoses:   Non-specific colitis   Hematochezia       I, Fe Salinas, am serving as a trained medical scribe to document services personally performed by Joann Ramirez MD based on the provider's statements to me on February 23, 2023.  This document has been checked and approved by the attending provider.    IJoann MD, was physically present and have reviewed and verified the accuracy of this note documented by Fe Salinas, medical scribe.      Joann Ramirez MD     [facility]  [facility] EMERGENCY DEPARTMENT  2/23/2023     Joann Ramirez MD  02/23/23 1921

## 2023-02-23 NOTE — DISCHARGE INSTRUCTIONS
Please make an appointment to follow up with Your Primary Care Provider and GI Clinic (phone: [phone]) in 7-14 days.    You were given a referral to GI clinic. Someone should call you to set up this appointment, but please call the number listed above, if you do not hear from someone within 1-2 business days.     Take cipro and flagyl to treat colitis.     You may continue to have some bloody stools until the colitis resolves.     Follow up with your primary doctor to recheck your hemoglobin if you have ongoing bleeding. Your primary may also treat you with additional medications.     Avoid imodium until symptoms resolve.    Follow up with GI for a colonoscopy for further evaluation. I suspect you have Segmental colitis associated with diverticulosis or SCAD .     Return to the emergency department for worsening pain, fever, inability to pass gas from below, significant worsening of the bleeding or frequent, large-volume, bloody stools.

## 2023-02-23 NOTE — ED PROVIDER NOTES
[facility] EMERGENCY DEPARTMENT ([facility])    2/23/23      ED PROVIDER NOTE  ED 5  9:12 AM   History     Chief Complaint   Patient presents with     Abdominal Pain     This AM at work missy blood stools, ABD pain yesterday with cramping. H/O IB     The history is provided by the patient and medical records.     Kerri Timmons is a 62 year old female with history of diverticulosis who presents with crampy abdominal pain that started last night  This crampy abdominal pain woke her up from sleep, similar to the cramps associated with a period, but has gone through menopause already. 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 Took Tums without much relief. She went to the bathroom and passed maroon red blood per rectum without of any stool content, just liquid. She continued to have diffuse abdominal discomfort throughout this morning.  She had another bowel movement with passage of similar maroon red blood at bottom of bowl without any stool content.  She states that it just looked like a large pile of maroon-colored blood at the bottom of the bowl. When she wiped she noticed bright red blood on toilet tissue.  This was concerning to patient and so she came down to the ED for further evaluation.  She notes prior history of hemorroidectomy after 2nd child but has no active hemorrhoids to her knowledge. The worst symptom at this time is the persistent abdominal cramping that has been ongoing all night. Has had loose stools ever since her cholecystectomy and has loose stools at baseline but this maroon blood per rectum is new for her.  She notes that her blood pressure is elevated here, notes that she forgot to take hypertension medications last night.  She did take them this morning. No dysuria.  She is not on anticoagulation.     Past Medical History  Past Medical History:   Diagnosis Date     ASCUS on Pap smear 11/30/2010    colp wnl     Cataract,  right eye 06/17/2017     Depressive disorder      Diabetes (H)     h/o gestational     Hypertension      SVT (supraventricular tachycardia) (H) 2008     Past Surgical History:   Procedure Laterality Date     APPENDECTOMY       CATARACT IOL, RT/LT Right 08/24/2018     CHOLECYSTECTOMY       COLONOSCOPY N/A 7/29/2021    Procedure: COLONOSCOPY, WITH POLYPECTOMY AND BIOPSY;  Surgeon: Po Constantino MD;  Location:  GI     PHACOEMULSIFICATION WITH STANDARD INTRAOCULAR LENS IMPLANT Right 8/22/2018    Procedure: PHACOEMULSIFICATION WITH STANDARD INTRAOCULAR LENS IMPLANT;  Right Eye Phacoemulsification with Standard Intraocular Lens;  Surgeon: Johny Daniels MD;  Location: [facility] OR     albuterol (PROAIR HFA/PROVENTIL HFA/VENTOLIN HFA) 108 (90 Base) MCG/ACT inhaler  cholestyramine (QUESTRAN) 4 GM/DOSE powder  ciprofloxacin (CIPRO) 500 MG tablet  clotrimazole (LOTRIMIN) 1 % external cream  DULoxetine (CYMBALTA) 60 MG capsule  losartan (COZAAR) 100 MG tablet  metroNIDAZOLE (FLAGYL) 500 MG tablet  MULTI-VITAMIN OR TABS  mupirocin (BACTROBAN) 2 % external ointment  nicotine (NICORETTE) 2 MG gum  oseltamivir (TAMIFLU) 75 MG capsule  oxyCODONE (ROXICODONE) 5 MG tablet  penciclovir (DENAVIR) 1 % external cream  valACYclovir (VALTREX) 1000 mg tablet  valACYclovir (VALTREX) 500 MG tablet  verapamil ER (CALAN-SR) 240 MG CR tablet      Allergies   Allergen Reactions     No Known Drug Allergies      Family History  Family History   Problem Relation Age of Onset     Skin Cancer Mother      C.A.D. Father         early 60's     Hypertension Father      Hypertension Sister      Alzheimer Disease Brother      Glaucoma No family hx of      Macular Degeneration No family hx of      Retinal detachment No family hx of      Amblyopia No family hx of      Social History   Social History     Tobacco Use     Smoking status: Light Smoker     Packs/day: 0.00     Years: 0.00     Pack years: 0.00     Types: Cigarettes     Smokeless tobacco: Never      Tobacco comments:     3 per week    Vaping Use     Vaping Use: Never used   Substance Use Topics     Alcohol use: Yes     Comment: a couple drinks 3 times per week      Drug use: No         A medically appropriate review of systems was performed with pertinent positives and negatives noted in the HPI, and all other systems negative.    Physical Exam   BP: (!) 141/103  Pulse: 77  Temp: 97.6  F (36.4  C)  Resp: 18  SpO2: 99 %  Physical Exam  Vitals and nursing note reviewed.   Constitutional:       General: She is not in acute distress.     Appearance: Normal appearance. She is well-developed. She is not ill-appearing, toxic-appearing or diaphoretic.   HENT:      Head: Normocephalic and atraumatic.      Nose: Nose normal.   Eyes:      General: No scleral icterus.     Conjunctiva/sclera: Conjunctivae normal.   Cardiovascular:      Rate and Rhythm: Normal rate.   Pulmonary:      Effort: Pulmonary effort is normal. No respiratory distress.      Breath sounds: No stridor.   Abdominal:      General: There is no distension.      Palpations: Abdomen is soft. There is no mass.      Tenderness: There is abdominal tenderness in the right lower quadrant, suprapubic area and left lower quadrant. There is no guarding or rebound.      Hernia: No hernia is present.   Genitourinary:     Comments: Declined rectal exam  Musculoskeletal:         General: No deformity. Normal range of motion.      Cervical back: Normal range of motion and neck supple. No rigidity.   Skin:     General: Skin is warm and dry.      Coloration: Skin is not jaundiced or pale.      Findings: No erythema.   Neurological:      General: No focal deficit present.      Mental Status: She is alert and oriented to person, place, and time.   Psychiatric:         Mood and Affect: Mood normal.         Behavior: Behavior normal.           ED Course, Procedures, & Data     ED Course as of 02/23/23 1927   u Feb 23, 2023   1046      EKG Interpretation:     Interpreted  by Joann Ramirez MD  Time reviewed:1045   Symptoms at time of EKG: abd pain   Rhythm: Normal sinus   Rate: Normal  Axis: Left Axis Deviation  Ectopy: None  Conduction: Normal  ST Segments/ T Waves: No ST-T wave changes and No acute ischemic changes  Q Waves: None    Clinical Impression: non-specific EKG         Procedures              CT ABDOMEN AND PELVIS WITH CONTRAST   2/26/2019 11:41 AM      HISTORY: Abdominal pain, unspecified. Chronic left upper quadrant  pain. Left upper quadrant abdominal pain.     TECHNIQUE: CT of the abdomen and pelvis was performed following the  administration of 73 mL Isovue-370. Radiation dose for this scan was  reduced using automated exposure control, adjustment of the mA and/or  kV according to patient size, or iterative reconstruction technique.     COMPARISON: None.     FINDINGS: There are a few scattered colonic diverticuli. No definite  evidence for acute diverticulitis. The bowel otherwise appears  unremarkable.     The solid organs in the abdomen appear unremarkable. Minimal bibasilar  atelectasis in the lungs.                                                                      IMPRESSION: Few scattered colonic diverticuli. No definite acute  abnormality.         COLONOSCOPY 7/29/2021  Findings:        The perianal and digital rectal examinations were normal. Pertinent        negatives include normal sphincter tone and no palpable rectal lesions.        The terminal ileum appeared normal. Biopsies were taken with a cold        forceps for histology.        The colon (entire examined portion) appeared normal. Biopsies for        histology were taken with a cold forceps from the entire colon for        evaluation of microscopic colitis.        Many medium-mouthed diverticula were found in the sigmoid colon; a few        containing fecaliths.        The exam was otherwise without abnormality on direct and retroflexion        views.   Impression:                 - The examined  portion of the ileum was normal. Biopsied.   - The entire examined colon is normal. Biopsied.   - Diverticulosis in the sigmoid colon.   - The examination was otherwise normal on direct and retroflexion views.      Results for orders placed or performed during the hospital encounter of 02/23/23   CT Abdomen Pelvis w Contrast     Status: None    Narrative    CT ABDOMEN AND PELVIS WITH CONTRAST 2/23/2023 10:16 AM    CLINICAL HISTORY: Abdominal pain. Lower abdominal cramping, maroon  stool, evaluate diverticular bleed, diverticulitis, colitis.    TECHNIQUE: CT scan of the abdomen and pelvis was performed following  injection of IV contrast. Multiplanar reformats were obtained. Dose  reduction techniques were used.  CONTRAST: 62mL Isovue 370    COMPARISON: February 26, 2019    FINDINGS:   LOWER CHEST: Trace atelectasis and/or fibrosis. No consolidation or  effusion.    HEPATOBILIARY: No significant mass or bile duct dilatation.  Cholecystectomy.     PANCREAS: No significant mass, duct dilatation, or inflammatory  change.    SPLEEN: Normal size.    ADRENAL GLANDS: No significant nodules.    KIDNEYS/BLADDER: No significant mass, stones, or hydronephrosis.    BOWEL: Inflammation of the colon from the hepatic flexure through the  distal descending colon, compatible with a nonspecific mild-moderate  uncomplicated colitis. No free air or abscess. Mild diverticulosis  without diverticulitis.    PELVIC ORGANS: No pelvic masses.    ADDITIONAL FINDINGS: There are minimal atherosclerotic changes of the  visualized aorta and its branches. There is no evidence of aortic  dissection or aneurysm. No free fluid.    MUSCULOSKELETAL: No frankly destructive bony lesions.      Impression    IMPRESSION:   1.  Left-sided colitis. This is an appropriate distribution for  ischemic colitis, although there is only mild scattered  atherosclerotic changes of the aorta and its branches and the celiac  axis and SMA are widely patent. NEENA is patent  as well. Differential  also includes infectious or inflammatory colitis. Consider correlation  with lactate levels.  2.  Mild diverticulosis without diverticulitis.    MARY ALVARADO MD         SYSTEM ID:  [id number]   Comprehensive metabolic panel     Status: Abnormal   Result Value Ref Range    Sodium 134 (L) 136 - 145 mmol/L    Potassium 4.0 3.4 - 5.3 mmol/L    Chloride 99 98 - 107 mmol/L    Carbon Dioxide (CO2) 26 22 - 29 mmol/L    Anion Gap 9 7 - 15 mmol/L    Urea Nitrogen 16.1 8.0 - 23.0 mg/dL    Creatinine 0.50 (L) 0.51 - 0.95 mg/dL    Calcium 10.0 8.8 - 10.2 mg/dL    Glucose 111 (H) 70 - 99 mg/dL    Alkaline Phosphatase 89 35 - 104 U/L    AST 27 10 - 35 U/L    ALT 25 10 - 35 U/L    Protein Total 7.4 6.4 - 8.3 g/dL    Albumin 4.3 3.5 - 5.2 g/dL    Bilirubin Total 0.4 <=1.2 mg/dL    GFR Estimate >90 >60 mL/min/1.73m2   CBC with platelets and differential     Status: Abnormal   Result Value Ref Range    WBC Count 10.3 4.0 - 11.0 10e3/uL    RBC Count 4.12 3.80 - 5.20 10e6/uL    Hemoglobin 13.6 11.7 - 15.7 g/dL    Hematocrit 40.1 35.0 - 47.0 %    MCV 97 78 - 100 fL    MCH 33.0 26.5 - 33.0 pg    MCHC 33.9 31.5 - 36.5 g/dL    RDW 13.0 10.0 - 15.0 %    Platelet Count 303 150 - 450 10e3/uL    % Neutrophils 83 %    % Lymphocytes 10 %    % Monocytes 5 %    % Eosinophils 2 %    % Basophils 0 %    % Immature Granulocytes 0 %    NRBCs per 100 WBC 0 <1 /100    Absolute Neutrophils 8.6 (H) 1.6 - 8.3 10e3/uL    Absolute Lymphocytes 1.0 0.8 - 5.3 10e3/uL    Absolute Monocytes 0.5 0.0 - 1.3 10e3/uL    Absolute Eosinophils 0.2 0.0 - 0.7 10e3/uL    Absolute Basophils 0.0 0.0 - 0.2 10e3/uL    Absolute Immature Granulocytes 0.0 <=0.4 10e3/uL    Absolute NRBCs 0.0 10e3/uL   Lactic acid whole blood     Status: Abnormal   Result Value Ref Range    Lactic Acid 0.5 (L) 0.7 - 2.0 mmol/L   CRP inflammation     Status: Normal   Result Value Ref Range    CRP Inflammation <3.00 <5.00 mg/L   UA with Microscopic reflex to Culture     Status:  Normal    Specimen: Urine, Clean Catch   Result Value Ref Range    Color Urine Straw Colorless, Straw, Light Yellow, Yellow    Appearance Urine Clear Clear    Glucose Urine Negative Negative mg/dL    Bilirubin Urine Negative Negative    Ketones Urine Negative Negative mg/dL    Specific Gravity Urine 1.031 1.003 - 1.035    Blood Urine Negative Negative    pH Urine 6.5 5.0 - 7.0    Protein Albumin Urine Negative Negative mg/dL    Urobilinogen Urine Normal Normal, 2.0 mg/dL    Nitrite Urine Negative Negative    Leukocyte Esterase Urine Negative Negative    RBC Urine 1 <=2 /HPF    WBC Urine <1 <=5 /HPF    Narrative    Urine Culture not indicated   CBC with platelets differential     Status: Abnormal    Narrative    The following orders were created for panel order CBC with platelets differential.  Procedure                               Abnormality         Status                     ---------                               -----------         ------                     CBC with platelets and d...[[id number]]  Abnormal            Final result                 Please view results for these tests on the individual orders.     Medications   0.9% sodium chloride BOLUS (0 mLs Intravenous Stopped 2/23/23 1135)   iopamidol (ISOVUE-370) solution 100 mL (62 mLs Intravenous $Given 2/23/23 1013)   sodium chloride 0.9 % bag 100mL (30 mLs Intravenous $Given 2/23/23 1015)     Labs Ordered and Resulted from Time of ED Arrival to Time of ED Departure   COMPREHENSIVE METABOLIC PANEL - Abnormal       Result Value    Sodium 134 (*)     Potassium 4.0      Chloride 99      Carbon Dioxide (CO2) 26      Anion Gap 9      Urea Nitrogen 16.1      Creatinine 0.50 (*)     Calcium 10.0      Glucose 111 (*)     Alkaline Phosphatase 89      AST 27      ALT 25      Protein Total 7.4      Albumin 4.3      Bilirubin Total 0.4      GFR Estimate >90     CBC WITH PLATELETS AND DIFFERENTIAL - Abnormal    WBC Count 10.3      RBC Count 4.12      Hemoglobin 13.6       Hematocrit 40.1      MCV 97      MCH 33.0      MCHC 33.9      RDW 13.0      Platelet Count 303      % Neutrophils 83      % Lymphocytes 10      % Monocytes 5      % Eosinophils 2      % Basophils 0      % Immature Granulocytes 0      NRBCs per 100 WBC 0      Absolute Neutrophils 8.6 (*)     Absolute Lymphocytes 1.0      Absolute Monocytes 0.5      Absolute Eosinophils 0.2      Absolute Basophils 0.0      Absolute Immature Granulocytes 0.0      Absolute NRBCs 0.0     LACTIC ACID WHOLE BLOOD - Abnormal    Lactic Acid 0.5 (*)    CRP INFLAMMATION - Normal    CRP Inflammation <3.00     ROUTINE UA WITH MICROSCOPIC REFLEX TO CULTURE - Normal    Color Urine Straw      Appearance Urine Clear      Glucose Urine Negative      Bilirubin Urine Negative      Ketones Urine Negative      Specific Gravity Urine 1.031      Blood Urine Negative      pH Urine 6.5      Protein Albumin Urine Negative      Urobilinogen Urine Normal      Nitrite Urine Negative      Leukocyte Esterase Urine Negative      RBC Urine 1      WBC Urine <1     ENTERIC BACTERIA AND VIRUS PANEL BY OSORIO STOOL     CT Abdomen Pelvis w Contrast   Final Result   IMPRESSION:    1.  Left-sided colitis. This is an appropriate distribution for   ischemic colitis, although there is only mild scattered   atherosclerotic changes of the aorta and its branches and the celiac   axis and SMA are widely patent. NEENA is patent as well. Differential   also includes infectious or inflammatory colitis. Consider correlation   with lactate levels.   2.  Mild diverticulosis without diverticulitis.      MARY ALVARADO MD            SYSTEM ID:  [id number]                 Medical Decision Making  The patient's presentation was of moderate complexity (an undiagnosed new problem with uncertain diagnosis).    The patient's evaluation involved:  ordering and/or review of 3+ test(s) in this encounter (see separate area of note for details)  review of 3+ test result(s) ordered prior to this  encounter (see separate area of note for details)  discussion of management or test interpretation with another health professional (see separate area of note for details)    The patient's management necessitated moderate risk (prescription drug management including medications given in the ED) and high risk (a decision regarding hospitalization).      Assessment & Plan    Kerri Timmons is a 62 year old female with history of diverticulosis who presents with crampy abdominal pain that started last night      Ddx: diverticular bleed, diverticulitis, acute blood loss anemia, internal hemorrhoids, colitis    Patient declines pain medication. Declines rectal exam. 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 No rectal pain so doubt significant bleeding anal fissure. Given IVF.  Declined pain medication.  Urinalysis normal.  CMP normal.  CBC with normal white count and hemoglobin of 13.6.  Normal platelets.  CRP normal.  CT showed left sided colitis.  Appropriate distribution for ischemic colitis although celiac axis and SMA are widely patent.  NEENA is patent.  Differential includes infectious versus inflammatory colitis. Mild diverticulosis without diverticulitis.  Added on EKG and lactate for further work-up.  Lactate normal.  EKG with normal sinus rhythm.  Pt did end up having a small volume bloody liquid stool with some solid debris in it.  Sent a stool culture.  Discussed CT with radiologist who said the mesenteric vasculature was widely patent.  He did not think a CT angiogram was indicated since the patient's lactate was normal as he had a very low suspicion for any missed arterial occlusion.  On reassessment, patient quite anxious about the bloody stool and did have some pain prior to passing the stool but she had very minimal volume of bloody output and this is an expected course.  I offered an observation admission for pain control and monitoring  but the patient preferred to be discharged home.  We discussed ongoing management with oral antibiotics.  I prescribed ciprofloxacin and Flagyl.  Patient advised to follow-up with her primary care provider for recheck hemoglobin if she continues to have bloody stools and to reassess response to therapy.  I suspect patient may have segmental colitis associated with diverticulosis or SCAD as she has a longstanding history of diarrhea.  She takes Imodium consistently.  I did encourage her to discontinue the Imodium until resolution of her current symptoms.  I gave her a formal referral to GI and for colonoscopy.  Also give a small prescription of oxycodone.  Detailed return precautions provided.    I have reviewed the nursing notes. I have reviewed the findings, diagnosis, plan and need for follow up with the patient.    Discharge Medication List as of 2/23/2023  2:06 PM      START taking these medications    Details   ciprofloxacin (CIPRO) 500 MG tablet Take 1 tablet (500 mg) by mouth 2 times daily for 10 days, Disp-20 tablet, R-0, E-Prescribe      metroNIDAZOLE (FLAGYL) 500 MG tablet Take 1 tablet (500 mg) by mouth 2 times daily for 10 days, Disp-20 tablet, R-0, E-PrescribeEat yogurt or cottage cheese daily to prevent diarrhea that can be caused by taking this antibiotic.      oxyCODONE (ROXICODONE) 5 MG tablet Take 1 tablet (5 mg) by mouth every 6 hours as needed for pain, Disp-5 tablet, R-0, E-Prescribe             Final diagnoses:   Non-specific colitis   Hematochezia       I, Fe Salinas, am serving as a trained medical scribe to document services personally performed by Joann Ramirez MD based on the provider's statements to me on February 23, 2023.  This document has been checked and approved by the attending provider.    IJoann MD, was physically present and have reviewed and verified the accuracy of this note documented by Fe Salinas, medical scribe.      Joann Ramirez MD     [facility]  [facility] EMERGENCY DEPARTMENT  2/23/2023     Joann Ramirez MD  02/23/23 1923

## 2023-02-24 LAB
ATRIAL RATE - MUSE: 71 BPM
DIASTOLIC BLOOD PRESSURE - MUSE: NORMAL MMHG
INTERPRETATION ECG - MUSE: NORMAL
P AXIS - MUSE: 35 DEGREES
PR INTERVAL - MUSE: 160 MS
QRS DURATION - MUSE: 94 MS
QT - MUSE: 428 MS
QTC - MUSE: 465 MS
R AXIS - MUSE: -32 DEGREES
SYSTOLIC BLOOD PRESSURE - MUSE: NORMAL MMHG
T AXIS - MUSE: 33 DEGREES
VENTRICULAR RATE- MUSE: 71 BPM

## 2023-02-27 ENCOUNTER — TELEPHONE (OUTPATIENT)
Dept: CALL CENTER | Age: 63
End: 2023-02-27
Payer: COMMERCIAL

## 2023-02-27 NOTE — TELEPHONE ENCOUNTER
REFERRAL INFORMATION:    Referring Provider:  Joann Ramirez MD    Referring Clinic:  [facility]    Reason for Visit/Diagnosis: Non-specific colitis     FUTURE VISIT INFORMATION:    Appointment Date: 4/17/2023     NOTES STATUS DETAILS   OFFICE NOTE from Referring Provider N/A    OFFICE NOTE from Other Specialist N/A    HOSPITAL DISCHARGE SUMMARY/  ED VISITS Internal 2/23/2023 [facility] ED   OPERATIVE REPORT N/A    MEDICATION LIST Internal         ENDOSCOPY  N/A    COLONOSCOPY Internal 7/29/2021 COLONOSCOPY, WITH POLYPECTOMY AND BIOPSY    10/29/2015 Colonoscopy    ERCP N/A    EUS N/A    STOOL TESTING N/A    PERTINENT LABS N/A    PATHOLOGY REPORTS (RELATED) N/A    IMAGING (CT, MRI, EGD, MRCP, Small Bowel Follow Through/SBT, MR/CT Enterography) Internal CT:   2/23/2023 Abdomen Pelvis   2/26/2019 Abdomen pelvis

## 2023-02-27 NOTE — TELEPHONE ENCOUNTER
[facility] Call Center    Phone Message    May a detailed message be left on voicemail: Yes    Reason for Call: Other: Patient is currently scheduled on 04/17/2023, as a patient New GI Urgent. This is outside the expected timeline for this schedule. Paitent has been added to the waitlist.      Action Taken: Message routed to:  Other: GI REFERRAL TRIAGE POOL     Travel Screening: Not Applicable

## 2023-02-28 ENCOUNTER — TELEPHONE (OUTPATIENT)
Dept: GASTROENTEROLOGY | Facility: CLINIC | Age: 63
End: 2023-02-28
Payer: COMMERCIAL

## 2023-02-28 ENCOUNTER — TELEPHONE (OUTPATIENT)
Dept: NUTRITION | Facility: CLINIC | Age: 63
End: 2023-02-28
Payer: COMMERCIAL

## 2023-02-28 DIAGNOSIS — K52.9 NON-SPECIFIC COLITIS: Primary | ICD-10-CM

## 2023-02-28 RX ORDER — ONDANSETRON 4 MG/1
TABLET, FILM COATED ORAL
Qty: 3 TABLET | Refills: 0 | Status: SHIPPED | OUTPATIENT
Start: 2023-02-28 | End: 2023-09-13

## 2023-02-28 RX ORDER — BISACODYL 5 MG
TABLET, DELAYED RELEASE (ENTERIC COATED) ORAL
Qty: 4 TABLET | Refills: 0 | Status: SHIPPED | OUTPATIENT
Start: 2023-02-28 | End: 2023-09-13

## 2023-02-28 NOTE — TELEPHONE ENCOUNTER
Spoke with Kerri and she continues to have loose stools. Directed her to call her primary Care MD as a GI provider has not seen her.   A colonoscopy has been ordered by the ED MD. Provided the number to Kerri to schedule the colonoscopy.   Confirmed appointment for 3-.

## 2023-02-28 NOTE — TELEPHONE ENCOUNTER
[facility] Call Center    Phone Message    May a detailed message be left on voicemail: yes     Reason for Call: Other:      Pt is requesting a call back ASAP to discuss what measures they can take to go back to work.    Action Taken: Message routed to:  [facility]    Travel Screening: Not Applicable

## 2023-02-28 NOTE — TELEPHONE ENCOUNTER
Screening Questions  BLUE  KIND OF PREP RED  LOCATION [review exclusion criteria] GREEN  SEDATION TYPE        Y Are you active on mychart?       Joann Ramirez Ordering/Referring Provider?        [facility] What type of coverage do you have?      N Have you had a positive covid test in the last 14 days?   22.3 1. BMI  [BMI 40+ - review exclusion criteria]    Y  2. Are you able to give consent for your medical care? [IF NO,RN REVIEW]          N  3. Are you taking any prescription pain medications on a routine schedule   (ex narcotics: oxycodone, roxicodone, oxycontin,  and percocet)? [RN Review]        N  3a. EXTENDED PREP What kind of prescription?     N 4. Do you have any chemical dependencies such as alcohol, street drugs, or methadone?        **If yes 3- 5 , please schedule with MAC sedation.**          IF YES TO ANY 6 - 10 - HOSPITAL SETTING ONLY.     N 6.   Do you need assistance transferring?     N7.   Have you had a heart or lung transplant?    N 8.   Are you currently on dialysis?   N 9.   Do you use daily home oxygen?   N 10. Do you take nitroglycerin?   10a. N If yes, how often?     11. [FEMALES]  N Are you currently pregnant?    11a. N If yes, how many weeks? [ Greater than 12 weeks, OR NEEDED]    N 12. Do you have Pulmonary Hypertension? *NEED PAC APPT AT [facility] w/ MAC*     N 13. [review exclusion criteria]  Do you have any implantable devices in your body (pacemaker, defib, LVAD)?    N 14. In the past 6 months, have you had any heart related issues including cardiomyopathy or heart attack?     14a. B If yes, did it require cardiac stenting if so when?     N 15. Have you had a stroke or Transient ischemic attack (TIA - aka  mini stroke ) within 6 months?      N16. Do you have mod to severe Obstructive Sleep Apnea?  [Hospital only]    N 17. Do you have SEVERE AND UNCONTROLLED asthma? *NEED PAC APPT AT [facility] w/MAC*     N 18. Are you currently taking any blood thinners?     18a. If yes, inform patient to  "\"follow up w/ ordering provider for bridging instructions.\"    N 19. Do you take the medication Phentermine?    19a. If yes, \"Hold for 7 days before procedure.  Please consult your prescribing provider if you have questions about holding this medication.\"     N  20. Do you have chronic kidney disease?      N  21. Do you have a diagnosis of diabetes?     N  22. On a regular basis do you go 3-5 days between bowel movements?     N 23. Preferred LOCAL Pharmacy for Pre Prescription    [ LIST ONLY ONE PHARMACY]     [id number] - [place], [place] - [address] AT [address] OF [address]        - CLOSING REMINDERS -    Informed patient they will need an adult    Cannot take any type of public or medical transportation alone    Conscious Sedation- Needs  for 6 hours after the procedure       MAC/General-Needs  for 24 hours after procedure    Pre-Procedure Covid test to be completed [ESSC PCR Testing Required]    Confirmed Nurse will call to complete assessment       - SCHEDULING DETAILS -  N Hospital Setting Required? If yes, what is the exclusion?: N   [place]  Surgeon    3/8/23  Date of Procedure  Lower Endoscopy [Colonoscopy]  Type of Procedure Scheduled  [facility] Location   [facility]-If you answer yes to questions #8, #20, #21Which Colonoscopy Prep was Sent?     MODERATE Sedation Type     N PAC / Pre-op Required                 "

## 2023-02-28 NOTE — TELEPHONE ENCOUNTER
Pre assessment questions completed for upcoming Colonoscopy  procedure scheduled on 3/8/23    COVID policy reviewed.     Pre-op exam? N/A    Reviewed procedural arrival time 0645, procedure time 0745 and facility location [facility]; [address] [place] [address], [address] Floor, [place] [address]    Designated  policy reviewed. Instructed to have someone stay 24 hours post procedure.     Anticoagulation/blood thinners? No    Electronic implanted devices? No    Diabetic? No    Procedure indication: Concern for segmental colitis associated with diverticulosis    Bowel prep recommendation: Standard Golytely d/t mag citrate recall    Pt reported history of nausea during Golytely prep.     Zofran prescription sent in to preferred pharmacy.     Pt declined to review procedure prep instructions. Pt stated she has had multiple colonoscopies in the past. Instructed pt to review colonoscopy prep instructions at least 7 days prior to procedure. NPO instructions given.     Pt verbalized understanding and was instructed to call if any questions or concerns arise.     Prep instructions sent via V[employer].      Bowel prep script sent to   [id number] - [place], [place] - [address] AT SEC OF [facility] [address].     Patient verbalized understanding and had no questions or concerns at this time.      Yocasta Donohue RN  Endoscopy Procedure Pre Assessment RN

## 2023-03-03 ENCOUNTER — TELEPHONE (OUTPATIENT)
Dept: GASTROENTEROLOGY | Facility: CLINIC | Age: 63
End: 2023-03-03
Payer: COMMERCIAL

## 2023-03-03 ENCOUNTER — PATIENT OUTREACH (OUTPATIENT)
Dept: GASTROENTEROLOGY | Facility: CLINIC | Age: 63
End: 2023-03-03
Payer: COMMERCIAL

## 2023-03-03 NOTE — TELEPHONE ENCOUNTER
[facility] Call Center    Phone Message    May a detailed message be left on voicemail: yes     Reason for Call: Other: Pt is requesting a call back please to discuss GI issues she is having, Pt requesting a nurse please give her a call back. Please advise. Thank you!     Action Taken: Message routed to:  [facility] ([facility] GI    Travel Screening: Not Applicable

## 2023-03-03 NOTE — PROGRESS NOTES
Spoke with Kerri and she continues to have loose stools.   She is stooling after she eats anything.   Since we have not seen her in the clinic advised her to follow up with her Primary Care Provider.  She has a colonoscopy on 3-8 and a clinic appointment on 3-28.   Discussed what she can eat and drink in preparation for the colonoscopy on Wed.

## 2023-03-04 NOTE — TELEPHONE ENCOUNTER
REFERRAL INFORMATION:    Referring Provider: Dr. Joann Ramirez    Referring Clinic:  [facility]     Reason for Visit/Diagnosis: Non-specific colitis      FUTURE VISIT INFORMATION:    Appointment Date: 3/28/2023    Appointment Time: 1 PM      NOTES STATUS DETAILS   OFFICE NOTE from Referring Provider Internal 2/23/2023 ED    OFFICE NOTE from Other Specialist N/A    HOSPITAL DISCHARGE SUMMARY/  ED VISITS N/A    OPERATIVE REPORT N/A    MEDICATION LIST Internal         ENDOSCOPY  N/A    COLONOSCOPY Internal 7/29/2021, 10/29/15, 7/13/10   ERCP N/A    EUS N/A    STOOL TESTING Internal 2/23/2023   PERTINENT LABS Internal/ Care Everywhere    PATHOLOGY REPORTS (RELATED) Internal 7/29/2021   IMAGING (CT, MRI, EGD, MRCP, Small Bowel Follow Through/SBT, MR/CT Enterography) Internal CT Abdomen Pelvis: 1/9/2023, 2/26/19

## 2023-03-06 ENCOUNTER — MYC REFILL (OUTPATIENT)
Dept: FAMILY MEDICINE | Facility: CLINIC | Age: 63
End: 2023-03-06
Payer: COMMERCIAL

## 2023-03-06 DIAGNOSIS — I10 HYPERTENSION GOAL BP (BLOOD PRESSURE) < 140/90: ICD-10-CM

## 2023-03-06 DIAGNOSIS — I47.10 PAROXYSMAL SUPRAVENTRICULAR TACHYCARDIA (H): ICD-10-CM

## 2023-03-06 RX ORDER — VERAPAMIL HYDROCHLORIDE 240 MG/1
TABLET, FILM COATED, EXTENDED RELEASE ORAL
Qty: 90 TABLET | Refills: 0 | Status: CANCELLED | OUTPATIENT
Start: 2023-03-06

## 2023-03-08 ENCOUNTER — ANESTHESIA EVENT (OUTPATIENT)
Dept: SURGERY | Facility: AMBULATORY SURGERY CENTER | Age: 63
End: 2023-03-08
Payer: COMMERCIAL

## 2023-03-08 ENCOUNTER — ANESTHESIA (OUTPATIENT)
Dept: SURGERY | Facility: AMBULATORY SURGERY CENTER | Age: 63
End: 2023-03-08
Payer: COMMERCIAL

## 2023-03-08 ENCOUNTER — HOSPITAL ENCOUNTER (OUTPATIENT)
Facility: AMBULATORY SURGERY CENTER | Age: 63
Discharge: HOME | End: 2023-03-08
Attending: INTERNAL MEDICINE | Admitting: INTERNAL MEDICINE
Payer: COMMERCIAL

## 2023-03-08 VITALS — HEART RATE: 65 BPM

## 2023-03-08 VITALS
SYSTOLIC BLOOD PRESSURE: 107 MMHG | WEIGHT: 120 LBS | RESPIRATION RATE: 12 BRPM | HEART RATE: 66 BPM | DIASTOLIC BLOOD PRESSURE: 69 MMHG | TEMPERATURE: 97 F | HEIGHT: 64 IN | OXYGEN SATURATION: 99 % | BODY MASS INDEX: 20.49 KG/M2

## 2023-03-08 DIAGNOSIS — K52.9 COLITIS: Primary | ICD-10-CM

## 2023-03-08 LAB — COLONOSCOPY: NORMAL

## 2023-03-08 PROCEDURE — 88305 TISSUE EXAM BY PATHOLOGIST: CPT | Mod: 26 | Performed by: PATHOLOGY

## 2023-03-08 PROCEDURE — 45380 COLONOSCOPY AND BIOPSY: CPT

## 2023-03-08 PROCEDURE — 88305 TISSUE EXAM BY PATHOLOGIST: CPT | Mod: TC | Performed by: INTERNAL MEDICINE

## 2023-03-08 RX ORDER — FLUMAZENIL 0.1 MG/ML
0.2 INJECTION, SOLUTION INTRAVENOUS
Status: CANCELLED | OUTPATIENT
Start: 2023-03-08 | End: 2023-03-08

## 2023-03-08 RX ORDER — NALOXONE HYDROCHLORIDE 0.4 MG/ML
0.2 INJECTION, SOLUTION INTRAMUSCULAR; INTRAVENOUS; SUBCUTANEOUS
Status: CANCELLED | OUTPATIENT
Start: 2023-03-08

## 2023-03-08 RX ORDER — PROPOFOL 10 MG/ML
INJECTION, EMULSION INTRAVENOUS CONTINUOUS PRN
Status: DISCONTINUED | OUTPATIENT
Start: 2023-03-08 | End: 2023-03-08

## 2023-03-08 RX ORDER — LIDOCAINE 40 MG/G
CREAM TOPICAL
Status: DISCONTINUED | OUTPATIENT
Start: 2023-03-08 | End: 2023-03-09 | Stop reason: HOSPADM

## 2023-03-08 RX ORDER — ONDANSETRON 4 MG/1
4 TABLET, ORALLY DISINTEGRATING ORAL EVERY 6 HOURS PRN
Status: CANCELLED | OUTPATIENT
Start: 2023-03-08

## 2023-03-08 RX ORDER — SODIUM CHLORIDE, SODIUM LACTATE, POTASSIUM CHLORIDE, CALCIUM CHLORIDE 600; 310; 30; 20 MG/100ML; MG/100ML; MG/100ML; MG/100ML
INJECTION, SOLUTION INTRAVENOUS CONTINUOUS PRN
Status: DISCONTINUED | OUTPATIENT
Start: 2023-03-08 | End: 2023-03-08

## 2023-03-08 RX ORDER — PROPOFOL 10 MG/ML
INJECTION, EMULSION INTRAVENOUS PRN
Status: DISCONTINUED | OUTPATIENT
Start: 2023-03-08 | End: 2023-03-08

## 2023-03-08 RX ORDER — ONDANSETRON 2 MG/ML
4 INJECTION INTRAMUSCULAR; INTRAVENOUS EVERY 6 HOURS PRN
Status: CANCELLED | OUTPATIENT
Start: 2023-03-08

## 2023-03-08 RX ORDER — PROCHLORPERAZINE MALEATE 10 MG
10 TABLET ORAL EVERY 6 HOURS PRN
Status: CANCELLED | OUTPATIENT
Start: 2023-03-08

## 2023-03-08 RX ORDER — NALOXONE HYDROCHLORIDE 0.4 MG/ML
0.4 INJECTION, SOLUTION INTRAMUSCULAR; INTRAVENOUS; SUBCUTANEOUS
Status: CANCELLED | OUTPATIENT
Start: 2023-03-08

## 2023-03-08 RX ORDER — ONDANSETRON 2 MG/ML
4 INJECTION INTRAMUSCULAR; INTRAVENOUS
Status: DISCONTINUED | OUTPATIENT
Start: 2023-03-08 | End: 2023-03-09 | Stop reason: HOSPADM

## 2023-03-08 RX ADMIN — PROPOFOL 40 MG: 10 INJECTION, EMULSION INTRAVENOUS at 07:53

## 2023-03-08 RX ADMIN — PROPOFOL 200 MCG/KG/MIN: 10 INJECTION, EMULSION INTRAVENOUS at 07:52

## 2023-03-08 RX ADMIN — SODIUM CHLORIDE, SODIUM LACTATE, POTASSIUM CHLORIDE, CALCIUM CHLORIDE: 600; 310; 30; 20 INJECTION, SOLUTION INTRAVENOUS at 07:12

## 2023-03-08 NOTE — ANESTHESIA CARE TRANSFER NOTE
Patient: Kerri Timmons    Procedure: Procedure(s):  COLONOSCOPY, WITH BIOPSY       Diagnosis: Non-specific colitis [K52.9]  Diagnosis Additional Information: No value filed.    Anesthesia Type:   MAC     Note:    Oropharynx: spontaneously breathing  Level of Consciousness: awake  Oxygen Supplementation: room air    Independent Airway: airway patency satisfactory and stable  Dentition: dentition unchanged  Vital Signs Stable: post-procedure vital signs reviewed and stable  Report to RN Given: handoff report given  Patient transferred to: Phase II    Handoff Report: Identifed the Patient, Identified the Reponsible Provider, Reviewed the pertinent medical history, Discussed the surgical course, Reviewed Intra-OP anesthesia mangement and issues during anesthesia, Set expectations for post-procedure period and Allowed opportunity for questions and acknowledgement of understanding      Vitals:  Vitals Value Taken Time   BP 97/60 03/08/23 0816   Temp 36.1  C (97  F) 03/08/23 0816   Pulse 68 03/08/23 0816   Resp 12 03/08/23 0816   SpO2 98 % 03/08/23 0816       Electronically Signed By: KELVIN Goodman CRNA  March 8, 2023  8:17 AM

## 2023-03-08 NOTE — ANESTHESIA POSTPROCEDURE EVALUATION
Patient: Kerri Timmons    Procedure: Procedure(s):  COLONOSCOPY, WITH BIOPSY       Anesthesia Type:  MAC    Note:  Disposition: Outpatient   Postop Pain Control: Uneventful            Sign Out: Well controlled pain   PONV: No   Neuro/Psych: Uneventful            Sign Out: Acceptable/Baseline neuro status   Airway/Respiratory: Uneventful            Sign Out: Acceptable/Baseline resp. status   CV/Hemodynamics: Uneventful            Sign Out: Acceptable CV status; No obvious hypovolemia; No obvious fluid overload   Other NRE: NONE   DID A NON-ROUTINE EVENT OCCUR? No           Last vitals:  Vitals Value Taken Time   /69 03/08/23 0830   Temp 36.1  C (97  F) 03/08/23 0816   Pulse 66 03/08/23 0830   Resp 12 03/08/23 0830   SpO2 99 % 03/08/23 0830       Electronically Signed By: John Willett MD, MD  March 8, 2023  9:49 AM

## 2023-03-08 NOTE — H&P
Kerri HOLLI Iain  [id number]  female  62 year old      Reason for procedure/surgery: colonoscopy    Patient Active Problem List   Diagnosis     Abnormal maternal glucose tolerance, antepartum     Malaise and fatigue     Disturbance in sleep behavior     Paroxysmal supraventricular tachycardia (H)     Cold sore     CARDIOVASCULAR SCREENING; LDL GOAL LESS THAN 160     ASCUS  Pap smear; + high risk HPV DNA     Health Care Home     Enthesopathy     Post concussive syndrome     [place] (hard of hearing)     Cataract, right eye     Hypertension goal BP (blood pressure) < 140/90     Major depressive disorder, recurrent episode, moderate (H)       Past Surgical History:    Past Surgical History:   Procedure Laterality Date     APPENDECTOMY       CATARACT IOL, RT/LT Right 08/24/2018     CHOLECYSTECTOMY       COLONOSCOPY N/A 7/29/2021    Procedure: COLONOSCOPY, WITH POLYPECTOMY AND BIOPSY;  Surgeon: Po Constantino MD;  Location:  GI     PHACOEMULSIFICATION WITH STANDARD INTRAOCULAR LENS IMPLANT Right 8/22/2018    Procedure: PHACOEMULSIFICATION WITH STANDARD INTRAOCULAR LENS IMPLANT;  Right Eye Phacoemulsification with Standard Intraocular Lens;  Surgeon: Johny Daniels MD;  Location:  OR       Past Medical History:   Past Medical History:   Diagnosis Date     ASCUS on Pap smear 11/30/2010    colp wnl     Cataract, right eye 06/17/2017     Depressive disorder      Diabetes (H)     h/o gestational     Hypertension      SVT (supraventricular tachycardia) (H) 2008       Social History:   Social History     Tobacco Use     Smoking status: Light Smoker     Packs/day: 0.00     Years: 0.00     Pack years: 0.00     Types: Cigarettes     Smokeless tobacco: Never     Tobacco comments:     3 per week    Substance Use Topics     Alcohol use: Yes     Comment: a couple drinks 3 times per week        Family History:   Family History   Problem Relation Age of Onset     Skin Cancer Mother      C.A.D. Father         early 60's      Hypertension Father      Hypertension Sister      Alzheimer Disease Brother      Glaucoma No family hx of      Macular Degeneration No family hx of      Retinal detachment No family hx of      Amblyopia No family hx of        Allergies:   Allergies   Allergen Reactions     No Known Drug Allergies        Active Medications:   Current Outpatient Medications   Medication Sig Dispense Refill     albuterol (PROAIR HFA/PROVENTIL HFA/VENTOLIN HFA) 108 (90 Base) MCG/ACT inhaler Inhale 2 puffs into the lungs every 6 hours as needed for shortness of breath, wheezing or cough 18 g 0     bisacodyl (DULCOLAX) 5 MG EC tablet Take 2 tablets at 3 pm the day before your procedure. If your procedure is before 11 am, take 2 additional tablets at 11 pm. If your procedure is after 11 am, take 2 additional tablets at 6 am. For additional instructions refer to your colonoscopy prep instructions. 4 tablet 0     cholestyramine (QUESTRAN) 4 GM/DOSE powder 2 grams in 4 oz water daily. Can increase to 4-8 grams twice daily. 378 g 3     DULoxetine (CYMBALTA) 60 MG capsule Take 1 capsule (60 mg) by mouth daily 90 capsule 0     losartan (COZAAR) 100 MG tablet Take 1 tablet (100 mg) by mouth daily 90 tablet 0     MULTI-VITAMIN OR TABS as directed 100 0     nicotine (NICORETTE) 2 MG gum Place 1 each (2 mg) inside cheek every hour as needed for smoking cessation 50 each 12     ondansetron (ZOFRAN) 4 MG tablet Take one tablet every six hours as needed for nausea during colonoscopy bowel prepping 3 tablet 0     oseltamivir (TAMIFLU) 75 MG capsule Take 1 capsule (75 mg) by mouth 2 times daily 10 capsule 0     polyethylene glycol (GOLYTELY) 236 g suspension The night before the exam at 6 pm drink an 8-ounce glass every 15 minutes until the jug is half empty. If you arrive before 11 AM: Drink the other half of the Biletuly jug at 11 PM night before procedure. If you arrive after 11 AM: Drink the other half of the Biletuly jug at 6 AM day of procedure.  "For additional instructions refer to your colonoscopy prep instructions. 4000 mL 0     valACYclovir (VALTREX) 1000 mg tablet Take 2 tablets (2,000 mg) by mouth 2 times daily 20 tablet PRN     verapamil ER (CALAN-SR) 240 MG CR tablet TAKE 1 TABLET(240 MG) BY MOUTH DAILY 90 tablet 0     clotrimazole (LOTRIMIN) 1 % external cream Apply topically 2 times daily To the entire feet and between the toes until 2 weeks after rash is healed 113 g 3     mupirocin (BACTROBAN) 2 % external ointment Use 2 times a day to affected area. 30 g 3     penciclovir (DENAVIR) 1 % external cream APPLY EVERY 2 HOURS AS DIRECTED 5 g PRN     valACYclovir (VALTREX) 500 MG tablet Take 1 tablet (500 mg) by mouth daily 90 tablet 1       Systemic Review:   CONSTITUTIONAL: NEGATIVE for fever, chills, change in weight  ENT/MOUTH: NEGATIVE for ear, mouth and throat problems  RESP: NEGATIVE for significant cough or SOB  CV: NEGATIVE for chest pain, palpitations or peripheral edema    Physical Examination:   Vital Signs: /72   Pulse 66   Temp 97.2  F (36.2  C) (Temporal)   Resp 16   Ht 1.626 m (5' 4\")   Wt 54.4 kg (120 lb)   LMP  (LMP Unknown)   SpO2 96%   BMI 20.60 kg/m    GENERAL: healthy, alert and no distress  RESP: Normal respiratory excursion   CV: regular rates and rhythm and no peripheral edema  ABDOMEN: soft, nontender and bowel sounds normal  MS: no gross musculoskeletal defects noted, no edema    Plan: Appropriate to proceed as scheduled.      Thomas M. Leventhal, MD  3/8/2023    PCP:  Mira Draper"

## 2023-03-08 NOTE — ANESTHESIA PREPROCEDURE EVALUATION
Anesthesia Pre-Procedure Evaluation    Patient: Kerri Timmons   MRN: [id number] : 1960        Procedure : Procedure(s):  Colonoscopy          Past Medical History:   Diagnosis Date     ASCUS on Pap smear 2010    colp wnl     Cataract, right eye 2017     Depressive disorder      Diabetes (H)     h/o gestational     Hypertension      SVT (supraventricular tachycardia) (H)       Past Surgical History:   Procedure Laterality Date     APPENDECTOMY       CATARACT IOL, RT/LT Right 2018     CHOLECYSTECTOMY       COLONOSCOPY N/A 2021    Procedure: COLONOSCOPY, WITH POLYPECTOMY AND BIOPSY;  Surgeon: Po Constantino MD;  Location:  GI     PHACOEMULSIFICATION WITH STANDARD INTRAOCULAR LENS IMPLANT Right 2018    Procedure: PHACOEMULSIFICATION WITH STANDARD INTRAOCULAR LENS IMPLANT;  Right Eye Phacoemulsification with Standard Intraocular Lens;  Surgeon: Johny Daniels MD;  Location: [facility] OR      Allergies   Allergen Reactions     No Known Drug Allergies       Social History     Tobacco Use     Smoking status: Light Smoker     Packs/day: 0.00     Years: 0.00     Pack years: 0.00     Types: Cigarettes     Smokeless tobacco: Never     Tobacco comments:     3 per week    Substance Use Topics     Alcohol use: Yes     Comment: a couple drinks 3 times per week       Wt Readings from Last 1 Encounters:   23 54.4 kg (120 lb)        Anesthesia Evaluation            ROS/MED HX  ENT/Pulmonary:       Neurologic:       Cardiovascular:     (+) hypertension-----    METS/Exercise Tolerance:     Hematologic:       Musculoskeletal:       GI/Hepatic:       Renal/Genitourinary:       Endo:     (+) type II DM, Not using insulin,     Psychiatric/Substance Use:     (+) psychiatric history anxiety and depression     Infectious Disease:       Malignancy:       Other:               OUTSIDE LABS:  CBC:   Lab Results   Component Value Date    WBC 10.3 2023    WBC 5.4 2023    HGB 13.6 2023     HGB 12.6 01/09/2023    HCT 40.1 02/23/2023    HCT 38.4 01/09/2023     02/23/2023     01/09/2023     BMP:   Lab Results   Component Value Date     (L) 02/23/2023     01/09/2023    POTASSIUM 4.0 02/23/2023    POTASSIUM 4.0 01/09/2023    CHLORIDE 99 02/23/2023    CHLORIDE 104 01/09/2023    CO2 26 02/23/2023    CO2 24 01/09/2023    BUN 16.1 02/23/2023    BUN 13.9 01/09/2023    CR 0.50 (L) 02/23/2023    CR 0.58 01/09/2023     (H) 02/23/2023    GLC 96 01/09/2023     COAGS:   Lab Results   Component Value Date    INR 0.95 01/18/2021     POC:   Lab Results   Component Value Date    HCG negative 01/15/2012     HEPATIC:   Lab Results   Component Value Date    ALBUMIN 4.3 02/23/2023    PROTTOTAL 7.4 02/23/2023    ALT 25 02/23/2023    AST 27 02/23/2023    ALKPHOS 89 02/23/2023    BILITOTAL 0.4 02/23/2023     OTHER:   Lab Results   Component Value Date    LACT 0.5 (L) 02/23/2023    A1C 5.6 08/22/2017    FADY 10.0 02/23/2023    LIPASE 152 08/16/2016    AMYLASE 54 11/12/2012    TSH 4.17 11/25/2022    T4 0.78 08/22/2017    CRP 0.09 08/19/2003    SED 30 11/25/2022       Anesthesia Plan    ASA Status:  2      Anesthesia Type: MAC.     - Reason for MAC: straight local not clinically adequate, immobility needed              Consents    Anesthesia Plan(s) and associated risks, benefits, and realistic alternatives discussed. Questions answered and patient/representative(s) expressed understanding.     - Discussed: Risks, Benefits and Alternatives for BOTH SEDATION and the PROCEDURE were discussed     - Discussed with:  Patient      - Extended Intubation/Ventilatory Support Discussed: No.      - Patient is DNR/DNI Status: No    Use of blood products discussed: No .     Postoperative Care    Pain management: IV analgesics, Oral pain medications.   PONV prophylaxis: Ondansetron (or other 5HT-3), Dexamethasone or Solumedrol     Comments:                John Willett MD, MD

## 2023-03-08 NOTE — INTERVAL H&P NOTE
I have reviewed the surgical (or preoperative) H&P that is linked to this encounter, and examined the patient. There are no significant changes    Clinical Conditions Present on Arrival:  Clinically Significant Risk Factors Present on Admission           # Hyponatremia: Lowest Na = 134 mmol/L in last 30 days, will monitor as appropriate

## 2023-03-28 ENCOUNTER — OFFICE VISIT (OUTPATIENT)
Dept: GASTROENTEROLOGY | Facility: CLINIC | Age: 63
End: 2023-03-28
Attending: EMERGENCY MEDICINE
Payer: COMMERCIAL

## 2023-03-28 ENCOUNTER — PRE VISIT (OUTPATIENT)
Dept: GASTROENTEROLOGY | Facility: CLINIC | Age: 63
End: 2023-03-28

## 2023-03-28 VITALS
HEIGHT: 64 IN | DIASTOLIC BLOOD PRESSURE: 81 MMHG | HEART RATE: 76 BPM | SYSTOLIC BLOOD PRESSURE: 121 MMHG | OXYGEN SATURATION: 96 % | BODY MASS INDEX: 21.15 KG/M2 | WEIGHT: 123.9 LBS

## 2023-03-28 DIAGNOSIS — R63.4 WEIGHT LOSS: ICD-10-CM

## 2023-03-28 DIAGNOSIS — K52.9 NON-SPECIFIC COLITIS: ICD-10-CM

## 2023-03-28 DIAGNOSIS — R19.7 DIARRHEA, UNSPECIFIED TYPE: Primary | ICD-10-CM

## 2023-03-28 PROCEDURE — 99417 PROLNG OP E/M EACH 15 MIN: CPT | Performed by: DIETITIAN, REGISTERED

## 2023-03-28 PROCEDURE — 99205 OFFICE O/P NEW HI 60 MIN: CPT | Performed by: DIETITIAN, REGISTERED

## 2023-03-28 RX ORDER — CHOLESTYRAMINE 4 G/9G
1 POWDER, FOR SUSPENSION ORAL DAILY
Qty: 60 PACKET | Refills: 4 | Status: SHIPPED | OUTPATIENT
Start: 2023-03-28

## 2023-03-28 ASSESSMENT — PAIN SCALES - GENERAL: PAINLEVEL: NO PAIN (0)

## 2023-03-28 NOTE — NURSING NOTE
"Chief Complaint   Patient presents with     New Patient       Vitals:    03/28/23 1250   BP: 121/81   Pulse: 76   SpO2: 96%   Weight: 56.2 kg (123 lb 14.4 oz)   Height: 1.613 m (5' 3.5\")       Body mass index is 21.6 kg/m .    [employer]    "

## 2023-03-28 NOTE — PROGRESS NOTES
GI CLINIC VISIT    CC/REFERRING MD:  Joann Ramirez  REASON FOR CONSULTATION: diarrhea, hematochezia    ASSESSMENT/PLAN:  #Chronic Diarrhea  #Weight loss  Patient with long history of loose, urgent stools.  This started after her gallbladder was removed.  High suspicion for bile acid diarrhea.  Other differential includes IBS-D/functional diarrhea, pancreatic insufficiency with recent weight loss, celiac, thyroid dysfunction, SIBO, other carbohydrate malabsorption.  Recent colonoscopy without evidence of inflammatory bowel disease or microscopic colitis or other cause of diarrhea.  We will start with getting labs and stool studies including fecal elastase, fecal fat, celiac serologies, TSH, particularly in light of recent weight loss.  We will also try low-dose cholestyramine at 2-4 g daily for likely bile acid diarrhea, with slow titration up as needed.  If no improvement could retry Imodium and/or soluble fiber. If weight loss continues consider CT Chest/XR Chest. Recent CT A/P with no evidence of malignancies or causes of weight loss. Suspect weight loss is related to dietary restriction to limit loose stools.      #Hematochezia, Abdominal Cramping  #Colitis on Imaging  Patient with 4 episodes of hematochezia over several hours with associated significant abdominal cramping prompting ED visit last month, which resolved within 24 hours.  No ongoing blood per rectum or cramping.  CT scan in ED did show left-sided colitis and distribution consistent with ischemic colitis, however vessels were felt to be widely patent.  Enteric panel is negative.  Colonoscopy after showed no sources of bleeding, inflammation, hemorrhoids, or fissures.  This is reassuring in addition to resolution of her symptoms.  Differential for symptoms include ischemic colitis potentially related to dehydration, less likely occlusion not picked up on CT.  Could also be related to enteric pathogen not picked up on panel, though resolution in less  than a day makes this a little less likely.  She does have diverticulosis, no evidence of diverticulitis on CT, potentially diverticular bleed however typically no cramping associated with this.  SCAD possibility however no evidence of this on colonoscopy.  This was likely an acute episode however if bleeding recurs recommend she go to ED again for further evaluation, could consider CT angiogram for further assessment if she has recurrence.    Plan:  -- Get labs and stool studies (fecal elastase, fecal fat, celiac serologies, TSH)  -- Start Cholestryramine (Questran) for diarrhea. Start at 2-4 g/day as a single dose or in divided doses; increase gradually to 2-3 packets per day as needed. Take with meals. Avoid medications 1 hr before and 4 hrs after to avoid potential drug interactions.  -- Work on hydration!  -- Please call or message us if you have continued weight loss  -- Please call us or go back to ED if you have bleeding again      Colorectal cancer screening: last colonoscopy 03/08/23, repeat colonoscopy in 5 years for surveillence    RTC 6 months, sooner if bleeding returns    Thank you for this consultation.  It was a pleasure to participate in the care of this patient; please contact us with any further questions.     87 Minutes was spent on the date of the encounter during chart review, history and exam, documentation, and further activities as noted       Kellie Tang PA-C, RD  Division of Gastroenterology, Hepatology & Nutrition  [facility]        JAMAL Timmons is a 62 year old female with a history of depression, hypertension s/p cholecystectomy (>20 years ago) who presents for evaluation of diarrhea and hematochezia. They are new to the [facility] GI clinic and this is my first encounter with the patient.     Patient presents today after ED Visit at [place] 2/23/23 for crampy abdominal pain and rectal bleeding.  She reports that crampy abdominal pain started overnight and woke her from  sleep, took Tums which was not helpful.  Describes pain as severe sharp cramps in lower abdomen.  She then went to work feeling diaphoretic with ongoing pain, had a bowel movement that was primarily red-maroon blood in toilet water.  She then went to the bathroom short while after with more red blood per rectum.  She tried to show co-worker and wiped bottom with just blood.  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  She had 1 more bloody bowel movement while in the ED.  In total she estimated about 1/3 cup blood. Abdominal pain and cramping continued throughout this course.  In the ED CT scan done which showed diverticulosis without diverticulitis and inflammation of the colon from the hepatic flexure through the distal descending colon. There were only mild scattered atherosclerotic changes of the aorta and its branches and the celiac axis, SMA, and NEENA widely patent. Labs in ED with unremarkable lactic acid, CRP, WBC, Hgb, Plts, CMP. EKG normal sinus rhythm. Enteric panel was negative. Per discussion that day with radiology given widely patent vessels seen and normal lactate CT angiogram not pursued. She was discharged with course of  ciprofloxacin and metronidazole.     Colonoscopy after ED visit showed diverticulosis in the recto-sigmoid, sigmoid and in the descending colon. No sources of bleeding, inflammation,  hemorrhoids or fissures were noted. Biopsies negative for inflammation and microscopic colitis.    Since ED visit she has not had return of rectal bleeding or abdominal cramping.  She overall feels well at her baseline today.      She does note she has been dealing with chronic diarrhea for the last 28 years, since her cholecystectomy.  She will have 5-10, loose, urgent stools per day if not taking Imodium.  Typically no blood in stool (first was at ED visit), no nighttime awakening, no missed days without stool. With 1 tab of Imodium daily to  every other days she will have 1 stool typically as long as she watches her diet.  If she has certain foods such as eggs and high-fat foods this will stimulate urgent loose stool.  No blood in stool at baseline.  Was prescribed cholestyramine, however only used once.  Has not tried soluble fiber or Metamucil.    She does not she has lost about 15 lbs in the past 6 months unintentionally.  . Eating less to have less diarrhea, denies abdominal pain with oral intake.  She does not that she has a hard time staying hydrated with loose stools and dislike of water.    GI review of systems otherwise negative, no regular abdominal pain, nausea, vomiting, bloating, reflux or GERD.    ROS:    No fevers or chills  No weight loss  No blurry vision, double vision or change in vision  No sore throat  No lymphadenopathy  No headache, paraesthesias, or weakness in a limb  No shortness of breath or wheezing  No chest pain or pressure  No arthralgias or myalgias  No rashes or skin changes  No odynophagia or dysphagia  No BRBPR, hematochezia, melena  No dysuria, frequency or urgency  No hot/cold intolerance or polyria  No anxiety or depression    PROBLEM LIST    Patient Active Problem List    Diagnosis Date Noted     Major depressive disorder, recurrent episode, moderate (H) 08/22/2017     Priority: Medium     Hypertension goal BP (blood pressure) < 140/90 07/19/2017     Priority: Medium     Cataract, right eye 06/17/2017     Priority: Medium     Post concussive syndrome 01/04/2012     Priority: Medium     P[place]key (hard of hearing) 01/04/2012     Priority: Medium     Enthesopathy 10/12/2011     Priority: Medium     Problem list name updated by automated process. Provider to review       Health Care Home 09/07/2011     Priority: Medium     X-Contacted the Patient. Care Coordinator introduced self and the program. Patient declined to participate. Will not open to care coordination.             DX V65.8 REPLACED WITH [id number]  HOME (04/08/2013)       ASCUS  Pap smear; + high risk HPV DNA 01/20/2011     Priority: Medium     11/30/10: Pap - ASCUS, + HPV 58  1/20/11: [place] - ECC - WNL. Plan pap in 6 months.  6/13/11: Pap - NIL. Plan pap in 6 months.  1/4/12: Pap - NIL.  Plan pap in 1 yr.  5/15/14: NIL pap, neg HPV, endometrial cells noted with LMP of 5/13/14. Plan pap.   7/2015: NIL pap. Plan pap in 3 years.  6/17/19 NIL pap, Neg HPV.                CARDIOVASCULAR SCREENING; LDL GOAL LESS THAN 160 10/31/2010     Priority: Medium     Cold sore 11/12/2009     Priority: Medium     Paroxysmal supraventricular tachycardia (H) 01/22/2009     Priority: Medium     Abnormal maternal glucose tolerance, antepartum 09/18/2006     Priority: Medium     Malaise and fatigue 09/18/2006     Priority: Medium     Problem list name updated by automated process. Provider to review       Disturbance in sleep behavior 09/18/2006     Priority: Medium     Problem list name updated by automated process. Provider to review         PERTINENT PAST MEDICAL HISTORY:  Past Medical History:   Diagnosis Date     ASCUS on Pap smear 11/30/2010    colp wnl     Cataract, right eye 06/17/2017     Depressive disorder      Diabetes (H)     h/o gestational     Hypertension      SVT (supraventricular tachycardia) (H) 2008       PREVIOUS SURGERIES:  Past Surgical History:   Procedure Laterality Date     APPENDECTOMY       CATARACT IOL, RT/LT Right 08/24/2018     CHOLECYSTECTOMY       COLONOSCOPY N/A 7/29/2021    Procedure: COLONOSCOPY, WITH POLYPECTOMY AND BIOPSY;  Surgeon: Po Constantino MD;  Location:  GI     COLONOSCOPY N/A 3/8/2023    Procedure: COLONOSCOPY, WITH BIOPSY;  Surgeon: Leventhal, Thomas Michael, MD;  Location: [facility] OR     PHACOEMULSIFICATION WITH STANDARD INTRAOCULAR LENS IMPLANT Right 8/22/2018    Procedure: PHACOEMULSIFICATION WITH STANDARD INTRAOCULAR LENS IMPLANT;  Right Eye Phacoemulsification with Standard Intraocular Lens;  Surgeon: Johny Daniels MD;   Location: [facility] OR       PREVIOUS ENDOSCOPY:  Colonoscopy 3/8/2023  Impression:              - Diverticulosis in the recto-sigmoid colon, in the sigmoid colon and in the descending colon.   - Biopsies were taken with a cold forceps for histology from the right colon and the left colon to evaluate for causes of diarrhea.     A. RIGHT COLON, RANDOM BIOPSY:  - Colonic mucosa with no significant histologic abnormality  - Negative for active inflammation and lymphocytic colitis    B. LEFT COLON, RANDOM BIOPSY:  - Colonic mucosa with no significant histologic abnormality  - Negative for active inflammation and lymphocytic colitis      ALLERGIES:     Allergies   Allergen Reactions     No Known Drug Allergies        PERTINENT MEDICATIONS:    Current Outpatient Medications:      albuterol (PROAIR HFA/PROVENTIL HFA/VENTOLIN HFA) 108 (90 Base) MCG/ACT inhaler, Inhale 2 puffs into the lungs every 6 hours as needed for shortness of breath, wheezing or cough, Disp: 18 g, Rfl: 0     bisacodyl (DULCOLAX) 5 MG EC tablet, Take 2 tablets at 3 pm the day before your procedure. If your procedure is before 11 am, take 2 additional tablets at 11 pm. If your procedure is after 11 am, take 2 additional tablets at 6 am. For additional instructions refer to your colonoscopy prep instructions., Disp: 4 tablet, Rfl: 0     cholestyramine (QUESTRAN) 4 GM/DOSE powder, 2 grams in 4 oz water daily. Can increase to 4-8 grams twice daily., Disp: 378 g, Rfl: 3     clotrimazole (LOTRIMIN) 1 % external cream, Apply topically 2 times daily To the entire feet and between the toes until 2 weeks after rash is healed, Disp: 113 g, Rfl: 3     DULoxetine (CYMBALTA) 60 MG capsule, Take 1 capsule (60 mg) by mouth daily, Disp: 90 capsule, Rfl: 0     losartan (COZAAR) 100 MG tablet, Take 1 tablet (100 mg) by mouth daily, Disp: 90 tablet, Rfl: 0     MULTI-VITAMIN OR TABS, as directed, Disp: 100, Rfl: 0     mupirocin (BACTROBAN) 2 % external ointment, Use 2 times a day  to affected area., Disp: 30 g, Rfl: 3     nicotine (NICORETTE) 2 MG gum, Place 1 each (2 mg) inside cheek every hour as needed for smoking cessation, Disp: 50 each, Rfl: 12     ondansetron (ZOFRAN) 4 MG tablet, Take one tablet every six hours as needed for nausea during colonoscopy bowel prepping, Disp: 3 tablet, Rfl: 0     oseltamivir (TAMIFLU) 75 MG capsule, Take 1 capsule (75 mg) by mouth 2 times daily, Disp: 10 capsule, Rfl: 0     penciclovir (DENAVIR) 1 % external cream, APPLY EVERY 2 HOURS AS DIRECTED, Disp: 5 g, Rfl: PRN     polyethylene glycol (GOLYTELY) 236 g suspension, The night before the exam at 6 pm drink an 8-ounce glass every 15 minutes until the jug is half empty. If you arrive before 11 AM: Drink the other half of the Golytely jug at 11 PM night before procedure. If you arrive after 11 AM: Drink the other half of the Golytely jug at 6 AM day of procedure. For additional instructions refer to your colonoscopy prep instructions., Disp: 4000 mL, Rfl: 0     valACYclovir (VALTREX) 1000 mg tablet, Take 2 tablets (2,000 mg) by mouth 2 times daily, Disp: 20 tablet, Rfl: PRN     valACYclovir (VALTREX) 500 MG tablet, Take 1 tablet (500 mg) by mouth daily, Disp: 90 tablet, Rfl: 1     verapamil ER (CALAN-SR) 240 MG CR tablet, TAKE 1 TABLET(240 MG) BY MOUTH DAILY, Disp: 90 tablet, Rfl: 0  No other NSAID/anticoagulation reported by patient.  No other OTC/herbal/supplements reported by patient.    SOCIAL HISTORY:  Work: works as NICU RN on [place]    Social History     Socioeconomic History     Marital status:      Spouse name: Not on file     Number of children: 2     Years of education: Not on file     Highest education level: Not on file   Occupational History     Employer: [employer]   Tobacco Use     Smoking status: Light Smoker     Packs/day: 0.00     Years: 0.00     Pack years: 0.00     Types: Cigarettes     Smokeless tobacco: Never     Tobacco comments:     3 per week     Vaping Use     Vaping Use: Never used   Substance and Sexual Activity     Alcohol use: Yes     Comment: a couple drinks 3 times per week      Drug use: No     Sexual activity: Yes     Partners: Male     Birth control/protection: None   Other Topics Concern     Parent/sibling w/ CABG, MI or angioplasty before 65F 55M? No   Social History Narrative    Caffeine intake/servings daily - 1    Calcium intake/servings daily - sup    Exercise 7 times weekly - describe walk    Sunscreen used - Yes    Seatbelts used - Yes    Guns stored in the home - No    Self Breast Exam - Yes and No    Pap test up to date -  Yes    Eye exam up to date -  Yes    Dental exam up to date -  Yes    DEXA scan up to date -  No    Flex Sig/Colonoscopy up to date -  Yes, 2010    Mammography up to date -  Yes    Immunizations reviewed and up to date - Yes    Abuse: Current or Past (Physical, Sexual or Emotional) - No    Do you feel safe in your environment - Yes    Do you cope well with stress - Yes    Do you suffer from insomnia - No    Last updated by: Dee Fitzpatrick Ma  1/20/2011                 Social Determinants of Health     Financial Resource Strain: Not on file   Food Insecurity: Not on file   Transportation Needs: Not on file   Physical Activity: Not on file   Stress: Not on file   Social Connections: Not on file   Intimate Partner Violence: Not on file   Housing Stability: Not on file         FAMILY HISTORY:    Family History   Problem Relation Age of Onset     Skin Cancer Mother      C.A.D. Father         early 60's     Hypertension Father      Hypertension Sister      Alzheimer Disease Brother      Glaucoma No family hx of      Macular Degeneration No family hx of      Retinal detachment No family hx of      Amblyopia No family hx of        Past/family/social history reviewed and no changes    PHYSICAL EXAMINATION:  Constitutional: AAOx3, cooperative, pleasant, not dyspneic/diaphoretic, no acute distress  Vitals reviewed: LMP  (LMP Unknown)    Wt:   Wt Readings from Last 2 Encounters:   03/08/23 54.4 kg (120 lb)   01/09/23 57.2 kg (126 lb)      Eyes: Sclera anicteric/injected  Ears/nose/mouth/throat: Normal oropharynx without ulcers or exudate, mucus membranes moist, hearing intact  Neck: supple, thyroid normal size  CV: No edema  Respiratory: Unlabored breathing  Lymph: No axillary, submandibular, supraclavicular or inguinal lymphadenopathy  Abd: Nondistended, +bs, no hepatosplenomegaly, nontender, no peritoneal signs  Skin: warm, perfused, no jaundice  Psych: Normal affect  MSK: Normal gait      PERTINENT STUDIES:  Office Visit on 01/09/2023   Component Date Value Ref Range Status     Influenza A antigen 01/09/2023 Negative  Negative Final     Influenza B antigen 01/09/2023 Negative  Negative Final     SARS CoV2 PCR 01/09/2023 Negative  Negative Final     WBC Count 01/09/2023 5.4  4.0 - 11.0 10e3/uL Final     RBC Count 01/09/2023 3.90  3.80 - 5.20 10e6/uL Final     Hemoglobin 01/09/2023 12.6  11.7 - 15.7 g/dL Final     Hematocrit 01/09/2023 38.4  35.0 - 47.0 % Final     MCV 01/09/2023 99  78 - 100 fL Final     MCH 01/09/2023 32.3  26.5 - 33.0 pg Final     MCHC 01/09/2023 32.8  31.5 - 36.5 g/dL Final     RDW 01/09/2023 13.4  10.0 - 15.0 % Final     Platelet Count 01/09/2023 331  150 - 450 10e3/uL Final     Sodium 01/09/2023 142  136 - 145 mmol/L Final     Potassium 01/09/2023 4.0  3.4 - 5.3 mmol/L Final     Chloride 01/09/2023 104  98 - 107 mmol/L Final     Carbon Dioxide (CO2) 01/09/2023 24  22 - 29 mmol/L Final     Anion Gap 01/09/2023 14  7 - 15 mmol/L Final     Urea Nitrogen 01/09/2023 13.9  8.0 - 23.0 mg/dL Final     Creatinine 01/09/2023 0.58  0.51 - 0.95 mg/dL Final     Calcium 01/09/2023 9.7  8.8 - 10.2 mg/dL Final     Glucose 01/09/2023 96  70 - 99 mg/dL Final     GFR Estimate 01/09/2023 >90  >60 mL/min/1.73m2 Final     CT A/P 02/23/2023  CLINICAL HISTORY: Abdominal pain. Lower abdominal cramping, maroon  stool, evaluate diverticular bleed,  diverticulitis, colitis.     TECHNIQUE: CT scan of the abdomen and pelvis was performed following  injection of IV contrast. Multiplanar reformats were obtained. Dose  reduction techniques were used.  CONTRAST: 62mL Isovue 370     COMPARISON: February 26, 2019     FINDINGS:   LOWER CHEST: Trace atelectasis and/or fibrosis. No consolidation or  effusion.     HEPATOBILIARY: No significant mass or bile duct dilatation.  Cholecystectomy.      PANCREAS: No significant mass, duct dilatation, or inflammatory  change.     SPLEEN: Normal size.     ADRENAL GLANDS: No significant nodules.     KIDNEYS/BLADDER: No significant mass, stones, or hydronephrosis.     BOWEL: Inflammation of the colon from the hepatic flexure through the  distal descending colon, compatible with a nonspecific mild-moderate  uncomplicated colitis. No free air or abscess. Mild diverticulosis  without diverticulitis.     PELVIC ORGANS: No pelvic masses.     ADDITIONAL FINDINGS: There are minimal atherosclerotic changes of the  visualized aorta and its branches. There is no evidence of aortic  dissection or aneurysm. No free fluid.     MUSCULOSKELETAL: No frankly destructive bony lesions.                                                                      IMPRESSION:   1.  Left-sided colitis. This is an appropriate distribution for ischemic colitis, although there is only mild scattered atherosclerotic changes of the aorta and its branches and the celiac axis and SMA are widely patent. NEENA is patent as well. Differential also includes infectious or inflammatory colitis. Consider correlation  with lactate levels.  2.  Mild diverticulosis without diverticulitis.

## 2023-03-28 NOTE — LETTER
3/28/2023         RE: Kerri Timmons  [address]  [place] [address]        Dear Colleague,    Thank you for referring your patient, Kerri Timmons, to the [facility] GASTROENTEROLOG[facility]INIC [place]. Please see a copy of my visit note below.    GI CLINIC VISIT    CC/REFERRING MD:  Joann Ramirez  REASON FOR CONSULTATION: diarrhea, hematochezia    ASSESSMENT/PLAN:  #Chronic Diarrhea  #Weight loss  Patient with long history of loose, urgent stools.  This started after her gallbladder was removed.  High suspicion for bile acid diarrhea.  Other differential includes IBS-D/functional diarrhea, pancreatic insufficiency with recent weight loss, celiac, thyroid dysfunction, SIBO, other carbohydrate malabsorption.  Recent colonoscopy without evidence of inflammatory bowel disease or microscopic colitis or other cause of diarrhea.  We will start with getting labs and stool studies including fecal elastase, fecal fat, celiac serologies, TSH, particularly in light of recent weight loss.  We will also try low-dose cholestyramine at 2-4 g daily for likely bile acid diarrhea, with slow titration up as needed.  If no improvement could retry Imodium and/or soluble fiber. If weight loss continues consider CT Chest/XR Chest. Recent CT A/P with no evidence of malignancies or causes of weight loss. Suspect weight loss is related to dietary restriction to limit loose stools.      #Hematochezia, Abdominal Cramping  #Colitis on Imaging  Patient with 4 episodes of hematochezia over several hours with associated significant abdominal cramping prompting ED visit last month, which resolved within 24 hours.  No ongoing blood per rectum or cramping.  CT scan in ED did show left-sided colitis and distribution consistent with ischemic colitis, however vessels were felt to be widely patent.  Enteric panel is negative.  Colonoscopy after showed no sources of bleeding, inflammation, hemorrhoids, or fissures.  This is reassuring  in addition to resolution of her symptoms.  Differential for symptoms include ischemic colitis potentially related to dehydration, less likely occlusion not picked up on CT.  Could also be related to enteric pathogen not picked up on panel, though resolution in less than a day makes this a little less likely.  She does have diverticulosis, no evidence of diverticulitis on CT, potentially diverticular bleed however typically no cramping associated with this.  SCAD possibility however no evidence of this on colonoscopy.  This was likely an acute episode however if bleeding recurs recommend she go to ED again for further evaluation, could consider CT angiogram for further assessment if she has recurrence.    Plan:  -- Get labs and stool studies (fecal elastase, fecal fat, celiac serologies, TSH)  -- Start Cholestryramine (Questran) for diarrhea. Start at 2-4 g/day as a single dose or in divided doses; increase gradually to 2-3 packets per day as needed. Take with meals. Avoid medications 1 hr before and 4 hrs after to avoid potential drug interactions.  -- Work on hydration!  -- Please call or message us if you have continued weight loss  -- Please call us or go back to ED if you have bleeding again      Colorectal cancer screening: last colonoscopy 03/08/23, repeat colonoscopy in 5 years for surveillence    RTC 6 months, sooner if bleeding returns    Thank you for this consultation.  It was a pleasure to participate in the care of this patient; please contact us with any further questions.     87 Minutes was spent on the date of the encounter during chart review, history and exam, documentation, and further activities as noted       Kellie Tang PA-C, RD  Division of Gastroenterology, Hepatology & Nutrition  [facility]        JAMAL Timmons is a 62 year old female with a history of depression, hypertension s/p cholecystectomy (>20 years ago) who presents for evaluation of diarrhea and hematochezia. They  are new to the [facility] GI clinic and this is my first encounter with the patient.     Patient presents today after ED Visit at [place] 2/23/23 for crampy abdominal pain and rectal bleeding.  She reports that crampy abdominal pain started overnight and woke her from sleep, took Tums which was not helpful.  Describes pain as severe sharp cramps in lower abdomen.  She then went to work feeling diaphoretic with ongoing pain, had a bowel movement that was primarily red-maroon blood in toilet water.  She then went to the bathroom short while after with more red blood per rectum.  She tried to show co-worker and wiped bottom with just blood.  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  She had 1 more bloody bowel movement while in the ED.  In total she estimated about 1/3 cup blood. Abdominal pain and cramping continued throughout this course.  In the ED CT scan done which showed diverticulosis without diverticulitis and inflammation of the colon from the hepatic flexure through the distal descending colon. There were only mild scattered atherosclerotic changes of the aorta and its branches and the celiac axis, SMA, and NEENA widely patent. Labs in ED with unremarkable lactic acid, CRP, WBC, Hgb, Plts, CMP. EKG normal sinus rhythm. Enteric panel was negative. Per discussion that day with radiology given widely patent vessels seen and normal lactate CT angiogram not pursued. She was discharged with course of  ciprofloxacin and metronidazole.     Colonoscopy after ED visit showed diverticulosis in the recto-sigmoid, sigmoid and in the descending colon. No sources of bleeding, inflammation,  hemorrhoids or fissures were noted. Biopsies negative for inflammation and microscopic colitis.    Since ED visit she has not had return of rectal bleeding or abdominal cramping.  She overall feels well at her baseline today.      She does note she has been dealing with chronic diarrhea  for the last 28 years, since her cholecystectomy.  She will have 5-10, loose, urgent stools per day if not taking Imodium.  Typically no blood in stool (first was at ED visit), no nighttime awakening, no missed days without stool. With 1 tab of Imodium daily to every other days she will have 1 stool typically as long as she watches her diet.  If she has certain foods such as eggs and high-fat foods this will stimulate urgent loose stool.  No blood in stool at baseline.  Was prescribed cholestyramine, however only used once.  Has not tried soluble fiber or Metamucil.    She does not she has lost about 15 lbs in the past 6 months unintentionally.  . Eating less to have less diarrhea, denies abdominal pain with oral intake.  She does not that she has a hard time staying hydrated with loose stools and dislike of water.    GI review of systems otherwise negative, no regular abdominal pain, nausea, vomiting, bloating, reflux or GERD.    ROS:    No fevers or chills  No weight loss  No blurry vision, double vision or change in vision  No sore throat  No lymphadenopathy  No headache, paraesthesias, or weakness in a limb  No shortness of breath or wheezing  No chest pain or pressure  No arthralgias or myalgias  No rashes or skin changes  No odynophagia or dysphagia  No BRBPR, hematochezia, melena  No dysuria, frequency or urgency  No hot/cold intolerance or polyria  No anxiety or depression    PROBLEM LIST    Patient Active Problem List    Diagnosis Date Noted     Major depressive disorder, recurrent episode, moderate (H) 08/22/2017     Priority: Medium     Hypertension goal BP (blood pressure) < 140/90 07/19/2017     Priority: Medium     Cataract, right eye 06/17/2017     Priority: Medium     Post concussive syndrome 01/04/2012     Priority: Medium     [place] (hard of hearing) 01/04/2012     Priority: Medium     Enthesopathy 10/12/2011     Priority: Medium     Problem list name updated by automated process. Provider to  review       Health Care Home 09/07/2011     Priority: Medium     X-Contacted the Patient. Care Coordinator introduced self and the program. Patient declined to participate. Will not open to care coordination.             DX V65.8 REPLACED WITH 87666 HEALTH CARE HOME (04/08/2013)       ASCUS  Pap smear; + high risk HPV DNA 01/20/2011     Priority: Medium     11/30/10: Pap - ASCUS, + HPV 58  1/20/11: [place] - ECC - WNL. Plan pap in 6 months.  6/13/11: Pap - NIL. Plan pap in 6 months.  1/4/12: Pap - NIL.  Plan pap in 1 yr.  5/15/14: NIL pap, neg HPV, endometrial cells noted with LMP of 5/13/14. Plan pap.   7/2015: NIL pap. Plan pap in 3 years.  6/17/19 NIL pap, Neg HPV.                CARDIOVASCULAR SCREENING; LDL GOAL LESS THAN 160 10/31/2010     Priority: Medium     Cold sore 11/12/2009     Priority: Medium     Paroxysmal supraventricular tachycardia (H) 01/22/2009     Priority: Medium     Abnormal maternal glucose tolerance, antepartum 09/18/2006     Priority: Medium     Malaise and fatigue 09/18/2006     Priority: Medium     Problem list name updated by automated process. Provider to review       Disturbance in sleep behavior 09/18/2006     Priority: Medium     Problem list name updated by automated process. Provider to review         PERTINENT PAST MEDICAL HISTORY:  Past Medical History:   Diagnosis Date     ASCUS on Pap smear 11/30/2010    colp wnl     Cataract, right eye 06/17/2017     Depressive disorder      Diabetes (H)     h/o gestational     Hypertension      SVT (supraventricular tachycardia) (H) 2008       PREVIOUS SURGERIES:  Past Surgical History:   Procedure Laterality Date     APPENDECTOMY       CATARACT IOL, RT/LT Right 08/24/2018     CHOLECYSTECTOMY       COLONOSCOPY N/A 7/29/2021    Procedure: COLONOSCOPY, WITH POLYPECTOMY AND BIOPSY;  Surgeon: Po Constantino MD;  Location:  GI     COLONOSCOPY N/A 3/8/2023    Procedure: COLONOSCOPY, WITH BIOPSY;  Surgeon: Leventhal, Thomas Michael, MD;  Location:  [facility] OR     PHACOEMULSIFICATION WITH STANDARD INTRAOCULAR LENS IMPLANT Right 8/22/2018    Procedure: PHACOEMULSIFICATION WITH STANDARD INTRAOCULAR LENS IMPLANT;  Right Eye Phacoemulsification with Standard Intraocular Lens;  Surgeon: Johny Daniels MD;  Location: [facility] OR       PREVIOUS ENDOSCOPY:  Colonoscopy 3/8/2023  Impression:              - Diverticulosis in the recto-sigmoid colon, in the sigmoid colon and in the descending colon.   - Biopsies were taken with a cold forceps for histology from the right colon and the left colon to evaluate for causes of diarrhea.     A. RIGHT COLON, RANDOM BIOPSY:  - Colonic mucosa with no significant histologic abnormality  - Negative for active inflammation and lymphocytic colitis    B. LEFT COLON, RANDOM BIOPSY:  - Colonic mucosa with no significant histologic abnormality  - Negative for active inflammation and lymphocytic colitis      ALLERGIES:     Allergies   Allergen Reactions     No Known Drug Allergies        PERTINENT MEDICATIONS:    Current Outpatient Medications:      albuterol (PROAIR HFA/PROVENTIL HFA/VENTOLIN HFA) 108 (90 Base) MCG/ACT inhaler, Inhale 2 puffs into the lungs every 6 hours as needed for shortness of breath, wheezing or cough, Disp: 18 g, Rfl: 0     bisacodyl (DULCOLAX) 5 MG EC tablet, Take 2 tablets at 3 pm the day before your procedure. If your procedure is before 11 am, take 2 additional tablets at 11 pm. If your procedure is after 11 am, take 2 additional tablets at 6 am. For additional instructions refer to your colonoscopy prep instructions., Disp: 4 tablet, Rfl: 0     cholestyramine (QUESTRAN) 4 GM/DOSE powder, 2 grams in 4 oz water daily. Can increase to 4-8 grams twice daily., Disp: 378 g, Rfl: 3     clotrimazole (LOTRIMIN) 1 % external cream, Apply topically 2 times daily To the entire feet and between the toes until 2 weeks after rash is healed, Disp: 113 g, Rfl: 3     DULoxetine (CYMBALTA) 60 MG capsule, Take 1 capsule (60 mg) by  mouth daily, Disp: 90 capsule, Rfl: 0     losartan (COZAAR) 100 MG tablet, Take 1 tablet (100 mg) by mouth daily, Disp: 90 tablet, Rfl: 0     MULTI-VITAMIN OR TABS, as directed, Disp: 100, Rfl: 0     mupirocin (BACTROBAN) 2 % external ointment, Use 2 times a day to affected area., Disp: 30 g, Rfl: 3     nicotine (NICORETTE) 2 MG gum, Place 1 each (2 mg) inside cheek every hour as needed for smoking cessation, Disp: 50 each, Rfl: 12     ondansetron (ZOFRAN) 4 MG tablet, Take one tablet every six hours as needed for nausea during colonoscopy bowel prepping, Disp: 3 tablet, Rfl: 0     oseltamivir (TAMIFLU) 75 MG capsule, Take 1 capsule (75 mg) by mouth 2 times daily, Disp: 10 capsule, Rfl: 0     penciclovir (DENAVIR) 1 % external cream, APPLY EVERY 2 HOURS AS DIRECTED, Disp: 5 g, Rfl: PRN     polyethylene glycol (GOLYTELY) 236 g suspension, The night before the exam at 6 pm drink an 8-ounce glass every 15 minutes until the jug is half empty. If you arrive before 11 AM: Drink the other half of the Golytely jug at 11 PM night before procedure. If you arrive after 11 AM: Drink the other half of the Golytely jug at 6 AM day of procedure. For additional instructions refer to your colonoscopy prep instructions., Disp: 4000 mL, Rfl: 0     valACYclovir (VALTREX) 1000 mg tablet, Take 2 tablets (2,000 mg) by mouth 2 times daily, Disp: 20 tablet, Rfl: PRN     valACYclovir (VALTREX) 500 MG tablet, Take 1 tablet (500 mg) by mouth daily, Disp: 90 tablet, Rfl: 1     verapamil ER (CALAN-SR) 240 MG CR tablet, TAKE 1 TABLET(240 MG) BY MOUTH DAILY, Disp: 90 tablet, Rfl: 0  No other NSAID/anticoagulation reported by patient.  No other OTC/herbal/supplements reported by patient.    SOCIAL HISTORY:  Work: works as NICU RN on [employer]    Social History     Socioeconomic History     Marital status:      Spouse name: Not on file     Number of children: 2     Years of education: Not on file     Highest education level: Not on file    Occupational History     Employer: [employer]   Tobacco Use     Smoking status: Light Smoker     Packs/day: 0.00     Years: 0.00     Pack years: 0.00     Types: Cigarettes     Smokeless tobacco: Never     Tobacco comments:     3 per week    Vaping Use     Vaping Use: Never used   Substance and Sexual Activity     Alcohol use: Yes     Comment: a couple drinks 3 times per week      Drug use: No     Sexual activity: Yes     Partners: Male     Birth control/protection: None   Other Topics Concern     Parent/sibling w/ CABG, MI or angioplasty before 65F 55M? No   Social History Narrative    Caffeine intake/servings daily - 1    Calcium intake/servings daily - sup    Exercise 7 times weekly - describe walk    Sunscreen used - Yes    Seatbelts used - Yes    Guns stored in the home - No    Self Breast Exam - Yes and No    Pap test up to date -  Yes    Eye exam up to date -  Yes    Dental exam up to date -  Yes    DEXA scan up to date -  No    Flex Sig/Colonoscopy up to date -  Yes, 2010    Mammography up to date -  Yes    Immunizations reviewed and up to date - Yes    Abuse: Current or Past (Physical, Sexual or Emotional) - No    Do you feel safe in your environment - Yes    Do you cope well with stress - Yes    Do you suffer from insomnia - No    Last updated by: Dee Fitzpatrick Ma  1/20/2011                 Social Determinants of Health     Financial Resource Strain: Not on file   Food Insecurity: Not on file   Transportation Needs: Not on file   Physical Activity: Not on file   Stress: Not on file   Social Connections: Not on file   Intimate Partner Violence: Not on file   Housing Stability: Not on file         FAMILY HISTORY:    Family History   Problem Relation Age of Onset     Skin Cancer Mother      C.A.D. Father         early 60's     Hypertension Father      Hypertension Sister      Alzheimer Disease Brother      Glaucoma No family hx of      Macular Degeneration No family hx of      Retinal  detachment No family hx of      Amblyopia No family hx of        Past/family/social history reviewed and no changes    PHYSICAL EXAMINATION:  Constitutional: AAOx3, cooperative, pleasant, not dyspneic/diaphoretic, no acute distress  Vitals reviewed: LMP  (LMP Unknown)   Wt:   Wt Readings from Last 2 Encounters:   03/08/23 54.4 kg (120 lb)   01/09/23 57.2 kg (126 lb)      Eyes: Sclera anicteric/injected  Ears/nose/mouth/throat: Normal oropharynx without ulcers or exudate, mucus membranes moist, hearing intact  Neck: supple, thyroid normal size  CV: No edema  Respiratory: Unlabored breathing  Lymph: No axillary, submandibular, supraclavicular or inguinal lymphadenopathy  Abd: Nondistended, +bs, no hepatosplenomegaly, nontender, no peritoneal signs  Skin: warm, perfused, no jaundice  Psych: Normal affect  MSK: Normal gait      PERTINENT STUDIES:  Office Visit on 01/09/2023   Component Date Value Ref Range Status     Influenza A antigen 01/09/2023 Negative  Negative Final     Influenza B antigen 01/09/2023 Negative  Negative Final     SARS CoV2 PCR 01/09/2023 Negative  Negative Final     WBC Count 01/09/2023 5.4  4.0 - 11.0 10e3/uL Final     RBC Count 01/09/2023 3.90  3.80 - 5.20 10e6/uL Final     Hemoglobin 01/09/2023 12.6  11.7 - 15.7 g/dL Final     Hematocrit 01/09/2023 38.4  35.0 - 47.0 % Final     MCV 01/09/2023 99  78 - 100 fL Final     MCH 01/09/2023 32.3  26.5 - 33.0 pg Final     MCHC 01/09/2023 32.8  31.5 - 36.5 g/dL Final     RDW 01/09/2023 13.4  10.0 - 15.0 % Final     Platelet Count 01/09/2023 331  150 - 450 10e3/uL Final     Sodium 01/09/2023 142  136 - 145 mmol/L Final     Potassium 01/09/2023 4.0  3.4 - 5.3 mmol/L Final     Chloride 01/09/2023 104  98 - 107 mmol/L Final     Carbon Dioxide (CO2) 01/09/2023 24  22 - 29 mmol/L Final     Anion Gap 01/09/2023 14  7 - 15 mmol/L Final     Urea Nitrogen 01/09/2023 13.9  8.0 - 23.0 mg/dL Final     Creatinine 01/09/2023 0.58  0.51 - 0.95 mg/dL Final     Calcium  01/09/2023 9.7  8.8 - 10.2 mg/dL Final     Glucose 01/09/2023 96  70 - 99 mg/dL Final     GFR Estimate 01/09/2023 >90  >60 mL/min/1.73m2 Final     CT A/P 02/23/2023  CLINICAL HISTORY: Abdominal pain. Lower abdominal cramping, maroon  stool, evaluate diverticular bleed, diverticulitis, colitis.     TECHNIQUE: CT scan of the abdomen and pelvis was performed following  injection of IV contrast. Multiplanar reformats were obtained. Dose  reduction techniques were used.  CONTRAST: 62mL Isovue 370     COMPARISON: February 26, 2019     FINDINGS:   LOWER CHEST: Trace atelectasis and/or fibrosis. No consolidation or  effusion.     HEPATOBILIARY: No significant mass or bile duct dilatation.  Cholecystectomy.      PANCREAS: No significant mass, duct dilatation, or inflammatory  change.     SPLEEN: Normal size.     ADRENAL GLANDS: No significant nodules.     KIDNEYS/BLADDER: No significant mass, stones, or hydronephrosis.     BOWEL: Inflammation of the colon from the hepatic flexure through the  distal descending colon, compatible with a nonspecific mild-moderate  uncomplicated colitis. No free air or abscess. Mild diverticulosis  without diverticulitis.     PELVIC ORGANS: No pelvic masses.     ADDITIONAL FINDINGS: There are minimal atherosclerotic changes of the  visualized aorta and its branches. There is no evidence of aortic  dissection or aneurysm. No free fluid.     MUSCULOSKELETAL: No frankly destructive bony lesions.                                                                      IMPRESSION:   1.  Left-sided colitis. This is an appropriate distribution for ischemic colitis, although there is only mild scattered atherosclerotic changes of the aorta and its branches and the celiac axis and SMA are widely patent. NEENA is patent as well. Differential also includes infectious or inflammatory colitis. Consider correlation  with lactate levels.  2.  Mild diverticulosis without diverticulitis.        Again, thank you for  allowing me to participate in the care of your patient.        Sincerely,        Kellie Tang PA-C

## 2023-03-28 NOTE — PATIENT INSTRUCTIONS
It was a pleasure taking care of you today.  I've included a brief summary of our discussion and care plan from today's visit below.  Please review this information with your primary care provider.  ______________________________________________________________________    My recommendations are summarized as follows:  -- Get labs and stool studies  -- Start Cholestryramine (Questran) for diarrhea. Start at 2-4 g/day as a single dose or in divided doses; increase gradually to 2-3 packets per day as needed. Take with meals. Avoid medications 1 hr before and 4 hrs after to avoid potential drug interactions.  -- Work on hydration!  -- Please call or message us if you have continued weight loss  -- Please call us or go back to ED if you have bleeding again        -- please see scheduling information provided below     Return to GI Clinic in 6 months to review your progress.    ______________________________________________________________________    How do I schedule labs, imaging studies, or procedures that were ordered in clinic today?     Labs: To schedule lab appointment at the [facility], use my chart or call [phone]. If you have a [place] lab closer to home where you are regularly seen you can give them a call.     Procedures: If a colonoscopy, upper endoscopy, breath test, esophageal manometry, or pH impedence was ordered today, our endoscopy team will call you to schedule this. If you have not heard from our endoscopy team within a week, please call [phone] to schedule.     Imaging Studies: If you were scheduled for a CT scan, X-ray, MRI, ultrasound, HIDA scan or other imaging study, please call [phone] to have this scheduled.     Referral: If a referral to another specialty was ordered, expect a phone call or follow instructions above. If you have not heard from anyone regarding your referral in a week, please call our clinic to check the status.     Who do I call with any  questions after my visit?  Please be in touch if there are any further questions that arise following today's visit.  There are multiple ways to contact your gastroenterology care team.      During business hours, you may reach a Gastroenterology nurse at [phone]    To schedule or reschedule an appointment, please call [phone].     You can always send a secure message through [employer].  Mobjoy messages are answered by your nurse or doctor typically within 24 hours.  Please allow extra time on weekends and holidays.      For urgent/emergent questions after business hours, you may reach the on-call GI Fellow by contacting the [facility] at [phone].     How will I get the results of any tests ordered?    You will receive all of your results.  If you have signed up for [facility], any tests ordered at your visit will be available to you after your provider reviews them.  Typically this takes 1-2 weeks.  If there are urgent results that require a change in your care plan, your provider or nurse will call you to discuss the next steps.      What is [employer]joy?  M[employer]joy is a secure way for you to access all of your healthcare records from the [facility].  It is a web based computer program, so you can sign on to it from any location.  It also allows you to send secure messages to your care team.  I recommend signing up for Mobjoy access if you have not already done so and are comfortable with using a computer.      How to I schedule a follow-up visit?  If you did not schedule a follow-up visit today, please call [phone] to schedule a follow-up office visit.      Sincerely,    Kellie Tang PA-C  Division of Gastroenterology, Hepatology & Nutrition  [facility]

## 2023-03-29 ENCOUNTER — TELEPHONE (OUTPATIENT)
Dept: GASTROENTEROLOGY | Facility: CLINIC | Age: 63
End: 2023-03-29
Payer: COMMERCIAL

## 2023-03-29 NOTE — TELEPHONE ENCOUNTER
"AMINA and collette sent (1st attempt) regarding GI clinic follow up    Schedule:  \"Return GI\" visit with Kellie Tang for 6 mo follow up (around 9/28/23)  "

## 2023-04-01 ENCOUNTER — HEALTH MAINTENANCE LETTER (OUTPATIENT)
Age: 63
End: 2023-04-01

## 2023-04-05 NOTE — TELEPHONE ENCOUNTER
LVM and mychart sent (2nd attempt)    Will remove follow up request due to max attempts    Please see below for scheduling instructions

## 2023-04-17 ENCOUNTER — PRE VISIT (OUTPATIENT)
Dept: GASTROENTEROLOGY | Facility: CLINIC | Age: 63
End: 2023-04-17

## 2023-06-30 DIAGNOSIS — I10 HYPERTENSION GOAL BP (BLOOD PRESSURE) < 140/90: ICD-10-CM

## 2023-06-30 DIAGNOSIS — I47.10 PAROXYSMAL SUPRAVENTRICULAR TACHYCARDIA (H): ICD-10-CM

## 2023-07-03 ENCOUNTER — HOSPITAL ENCOUNTER (EMERGENCY)
Facility: CLINIC | Age: 63
Discharge: HOME | End: 2023-07-03
Attending: EMERGENCY MEDICINE | Admitting: EMERGENCY MEDICINE
Payer: COMMERCIAL

## 2023-07-03 VITALS
HEART RATE: 68 BPM | OXYGEN SATURATION: 99 % | DIASTOLIC BLOOD PRESSURE: 83 MMHG | TEMPERATURE: 97.9 F | WEIGHT: 129.3 LBS | RESPIRATION RATE: 12 BRPM | SYSTOLIC BLOOD PRESSURE: 138 MMHG | BODY MASS INDEX: 22.55 KG/M2

## 2023-07-03 DIAGNOSIS — M54.42 ACUTE LEFT-SIDED LOW BACK PAIN WITH LEFT-SIDED SCIATICA: ICD-10-CM

## 2023-07-03 PROCEDURE — 250N000013 HC RX MED GY IP 250 OP 250 PS 637: Performed by: EMERGENCY MEDICINE

## 2023-07-03 PROCEDURE — 99283 EMERGENCY DEPT VISIT LOW MDM: CPT | Performed by: EMERGENCY MEDICINE

## 2023-07-03 PROCEDURE — 99284 EMERGENCY DEPT VISIT MOD MDM: CPT | Performed by: EMERGENCY MEDICINE

## 2023-07-03 RX ORDER — ACETAMINOPHEN 325 MG/1
975 TABLET ORAL ONCE
Status: COMPLETED | OUTPATIENT
Start: 2023-07-03 | End: 2023-07-03

## 2023-07-03 RX ORDER — VERAPAMIL HYDROCHLORIDE 240 MG/1
TABLET, FILM COATED, EXTENDED RELEASE ORAL
Qty: 10 TABLET | Refills: 0 | Status: SHIPPED | OUTPATIENT
Start: 2023-07-03 | End: 2023-09-11

## 2023-07-03 RX ORDER — DIAZEPAM 5 MG
5 TABLET ORAL EVERY 12 HOURS PRN
Qty: 6 TABLET | Refills: 0 | Status: SHIPPED | OUTPATIENT
Start: 2023-07-03 | End: 2023-07-06

## 2023-07-03 RX ORDER — LOSARTAN POTASSIUM 100 MG/1
100 TABLET ORAL DAILY
Qty: 10 TABLET | Refills: 0 | Status: SHIPPED | OUTPATIENT
Start: 2023-07-03 | End: 2023-09-11

## 2023-07-03 RX ORDER — NAPROXEN 500 MG/1
500 TABLET ORAL ONCE
Status: COMPLETED | OUTPATIENT
Start: 2023-07-03 | End: 2023-07-03

## 2023-07-03 RX ORDER — LIDOCAINE 4 G/G
2 PATCH TOPICAL ONCE
Status: DISCONTINUED | OUTPATIENT
Start: 2023-07-03 | End: 2023-07-03 | Stop reason: HOSPADM

## 2023-07-03 RX ADMIN — LIDOCAINE PATCH 4% 2 PATCH: 40 PATCH TOPICAL at 18:00

## 2023-07-03 RX ADMIN — ACETAMINOPHEN 975 MG: 325 TABLET, FILM COATED ORAL at 18:00

## 2023-07-03 RX ADMIN — NAPROXEN 500 MG: 500 TABLET ORAL at 18:00

## 2023-07-03 NOTE — TELEPHONE ENCOUNTER
"Routing refill request to provider for review/approval because:  --Last orders were moe.  --VM left 3/6/23.  --Last two scheduled preventive visits 4/25/23 and 6/19/23 were cancelled for reason\" other.\"      --Last visit:  1/9/2023 acute care with [facility].    --Last chronic care visit 2/23/22 [facility].    --Future Visit: none.        --Last Written Prescription:     Disp Refills Start End CAESAR   verapamil ER (CALAN-SR) 240 MG CR tablet 90 tablet 0 3/6/2023  No   Sig: TAKE 1 TABLET(240 MG) BY MOUTH DAILY   Sent to pharmacy as: Verapamil HCl  MG Oral Tablet Extended Release (CALAN-SR)   Class: E-Prescribe   Notes to Pharmacy: Medication is being filled for 1 time refill only due to:  DUE FOR ANNUAL VISIT AT [facility].      Disp Refills Start End CAESAR   losartan (COZAAR) 100 MG tablet 90 tablet 0 3/6/2023  No   Sig - Route: Take 1 tablet (100 mg) by mouth daily - Oral   Sent to pharmacy as: Losartan Potassium 100 MG Oral Tablet (COZAAR)   Class: E-Prescribe   Notes to Pharmacy: Medication is being filled for 1 time refill only due to:  DUE FOR ANNUAL VISIT AT [facility].           "

## 2023-07-03 NOTE — ED PROVIDER NOTES
[facility] EMERGENCY DEPARTMENT ([facility])    7/03/23      ED PROVIDER NOTE  [id number]      History     Chief Complaint   Patient presents with     Back Pain     Left and radiates down hamstring to calf.      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 This was managed at [facility] as outpatient. Over the last week she has had intermittent twinges of discomfort on the left side of her low back radiating down her left leg in a similar distribution. 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 No numbness. No strength deficits.Patient laid down and severe pain slowly subsided and now is mild to moderate pain. Patient is worried about sciatica on the left side physical exam patient has positive straight leg raise on the left lower extremity. No sensation or strength deficits. Tenderness in the left sciatic notch and mild left lumbar paraspinal muscles and spasm. No midline back pain and LS-spine assessment and plan left back pain radiating to the left leg has sciatica plan is excision NSAIDs Tylenol and lidocaine patch have discussed with the patient regarding as needed muscle relaxers and the warm as well as follow-up with her orthopedist versus referral to our outpatient orthopedist clinic        A medically appropriate review of systems was performed with pertinent positives and negatives noted in the HPI, and all other systems negative.    Physical Exam   BP: 138/83  Pulse: 68  Temp: 97.9  F (36.6  C)  Resp: 12  Weight: 58.7 kg (129 lb 4.8 oz)  SpO2: 99 %  Physical Exam      ED Course, Procedures, & Data      Procedures                     No results found for any visits on 07/03/23.  Medications - No data to display  Labs Ordered and Resulted from Time of ED Arrival to Time of ED Departure - No  data to display  No orders to display          Critical care was not performed.     Medical Decision Making  The patient's presentation was of moderate complexity (an acute complicated injury).    The patient's evaluation involved:  history and exam without other MDM data elements    The patient's management necessitated moderate risk (prescription drug management including medications given in the ED).      Assessment & Plan      Left back pain radiating to the left leg, has sciatica plan is excision NSAIDs Tylenol and lidocaine patch. I have discussed with the patient regarding as needed muscle relaxers and the warmth. I also discussed with patient to follow-up with her orthopedist versus referral to our outpatient orthopedist clinic.    I have reviewed the nursing notes. I have reviewed the findings, diagnosis, plan and need for follow up with the patient.    New Prescriptions    No medications on file       Final diagnoses:   None       John Marr MD  [facility] EMERGENCY DEPARTMENT  7/3/2023     John Marr MD  07/04/23 0894

## 2023-07-03 NOTE — DISCHARGE INSTRUCTIONS
For pain, please take 975-1000mg acetaminophen (tylenol) every 8 hours -- do not take more than 3000mg in a 24 hour period.    You can take 500mg naproxen (aleve) every 12 hours for pain  Follow-up with your orthopedist --call at your earliest convenience for the first appointment.  If they cannot take you in an urgent fashion, a referral has been placed on your behalf for orthopedics  Return to the emergency department for leg weakness urinary incontinence or severe pain

## 2023-07-11 ENCOUNTER — TRANSFERRED RECORDS (OUTPATIENT)
Dept: HEALTH INFORMATION MANAGEMENT | Facility: CLINIC | Age: 63
End: 2023-07-11

## 2023-07-18 ENCOUNTER — TRANSFERRED RECORDS (OUTPATIENT)
Dept: HEALTH INFORMATION MANAGEMENT | Facility: CLINIC | Age: 63
End: 2023-07-18

## 2023-07-19 ENCOUNTER — HOSPITAL ENCOUNTER (EMERGENCY)
Facility: CLINIC | Age: 63
Discharge: HOME | End: 2023-07-19
Attending: STUDENT IN AN ORGANIZED HEALTH CARE EDUCATION/TRAINING PROGRAM | Admitting: STUDENT IN AN ORGANIZED HEALTH CARE EDUCATION/TRAINING PROGRAM
Payer: COMMERCIAL

## 2023-07-19 VITALS
TEMPERATURE: 97 F | OXYGEN SATURATION: 98 % | RESPIRATION RATE: 16 BRPM | SYSTOLIC BLOOD PRESSURE: 145 MMHG | DIASTOLIC BLOOD PRESSURE: 83 MMHG | HEART RATE: 64 BPM

## 2023-07-19 DIAGNOSIS — G89.29 CHRONIC LEFT-SIDED LOW BACK PAIN WITH LEFT-SIDED SCIATICA: ICD-10-CM

## 2023-07-19 DIAGNOSIS — M54.42 CHRONIC LEFT-SIDED LOW BACK PAIN WITH LEFT-SIDED SCIATICA: ICD-10-CM

## 2023-07-19 LAB
HOLD SPECIMEN: NORMAL

## 2023-07-19 PROCEDURE — 99284 EMERGENCY DEPT VISIT MOD MDM: CPT | Mod: 25

## 2023-07-19 PROCEDURE — 250N000013 HC RX MED GY IP 250 OP 250 PS 637: Performed by: STUDENT IN AN ORGANIZED HEALTH CARE EDUCATION/TRAINING PROGRAM

## 2023-07-19 PROCEDURE — 96374 THER/PROPH/DIAG INJ IV PUSH: CPT

## 2023-07-19 PROCEDURE — 250N000011 HC RX IP 250 OP 636: Mod: JZ | Performed by: STUDENT IN AN ORGANIZED HEALTH CARE EDUCATION/TRAINING PROGRAM

## 2023-07-19 RX ORDER — ACETAMINOPHEN 500 MG
1000 TABLET ORAL ONCE
Status: COMPLETED | OUTPATIENT
Start: 2023-07-19 | End: 2023-07-19

## 2023-07-19 RX ORDER — KETOROLAC TROMETHAMINE 15 MG/ML
15 INJECTION, SOLUTION INTRAMUSCULAR; INTRAVENOUS ONCE
Status: COMPLETED | OUTPATIENT
Start: 2023-07-19 | End: 2023-07-19

## 2023-07-19 RX ORDER — LIDOCAINE 50 MG/G
1 PATCH TOPICAL EVERY 24 HOURS
Qty: 10 PATCH | Refills: 0 | Status: SHIPPED | OUTPATIENT
Start: 2023-07-19 | End: 2023-07-29

## 2023-07-19 RX ORDER — DIAZEPAM 5 MG
5 TABLET ORAL ONCE
Status: COMPLETED | OUTPATIENT
Start: 2023-07-19 | End: 2023-07-19

## 2023-07-19 RX ADMIN — DIAZEPAM 5 MG: 5 TABLET ORAL at 06:52

## 2023-07-19 RX ADMIN — KETOROLAC TROMETHAMINE 15 MG: 15 INJECTION, SOLUTION INTRAMUSCULAR; INTRAVENOUS at 06:51

## 2023-07-19 RX ADMIN — ACETAMINOPHEN 1000 MG: 500 TABLET, FILM COATED ORAL at 06:52

## 2023-07-19 ASSESSMENT — ACTIVITIES OF DAILY LIVING (ADL): ADLS_ACUITY_SCORE: 35

## 2023-07-19 NOTE — ED PROVIDER NOTES
History   Chief Complaint:  Back Pain    HPI   Kerri Timmons is a 62 year old female presenting to the emergency department for evaluation of low back pain. Kerri was seen at [facility] two weeks ago (7/3) for left-sided back pain with sciatica and discharged to home with valium and recommended outpatient followup with orthopedics. Kerri reports that she has followed up with [facility] for her back pain with her most recent MRI scan being 8 days ago. She reports being seen at [facility] yesterday for a cortisone shots. She was not sent home with medications and attempted to use tylenol without any relief. She woke this morning approximately 90 minutes prior to emergency department arrival (0400) with '10+ extreme pain' in the lower back with pain radiation and numbness down the left lower extremity into the left foot. She reports left foot numbness intermittently over the past two weeks accompanied by thigh and calf paresthesias. Denies known injury or trauma. Denies abdominal pain, fevers, saddle paresthesias, and bowel or bladder incontinence. She states that her  drove her to the emergency department today and she needed to use a wheelchair upon arrival. Denies history of aneurysm. She does use tobacco. No IVDU. Denies back surgeries.    Independent Historian:   None - Patient Only    Review of External Notes:   ED provider note, [facility] July 3, 2023 for back pain.    Medications:    Albuterol   Questran   Cymbalta   Losartan   Zofran   Valtrex   Verapamil   Valium     Past Medical History:    Depressive disorder   Diabetes   Hypertension   SVT   Acute left-sided low back pain with sciatica    Past Surgical History:    Appendectomy   Cholecystectomy   Colonoscopy  x 2     Physical Exam     Patient Vitals for the past 24 hrs:   BP Temp Temp src Pulse Resp SpO2   07/19/23 0730 (!) 146/91 -- -- -- -- 93 %   07/19/23 0655 139/87 -- -- 67 -- 100 %   07/19/23 0546 (!) 171/152 97  F (36.1  C) Temporal 91 16 100  %     Physical Exam  GENERAL: Patient in moderate distress due to pain.  HEAD: Atraumatic.  Neck: No rigidity  CV: RRR, no murmurs rubs or gallops  PULM: CTAB with good aeration; no retractions, rales, rhonchi, or wheezing  ABD: No pulsatile masses.  No tenderness.  DERM: No rash. Skin warm and dry  EXTREMITY: Sensation intact. No left gluteal tenderness, crepitus, skin changes, or deformities. No redness or swelling to the SI injection site.   VASCULAR: Symmetric pulses bilaterally. Equal DP pulses.   Neuro: Strength 4+/5 in the left lower extremity limited due to pain. Sensation intact to light touch left lower extremity.  Back: No midline spinal tenderness, crepitus, or deformity.    Emergency Department Course     Imaging:  No orders to display           Laboratory:  Labs Ordered and Resulted from Time of ED Arrival to Time of ED Departure - No data to display          Emergency Department Course & Assessments:    Interventions:  Medications   ketorolac (TORADOL) injection 15 mg (15 mg Intravenous $Given 7/19/23 0651)   acetaminophen (TYLENOL) tablet 1,000 mg (1,000 mg Oral $Given 7/19/23 0652)   diazepam (VALIUM) tablet 5 mg (5 mg Oral $Given 7/19/23 0652)     Assessments:  0632 I obtained history and examined the patient as noted above.    0748 I rechecked the patient.      Independent Interpretation (X-rays, CTs, rhythm strip):  None    Consultations/Discussion of Management or Tests:  None        Social Determinants of Health affecting care:   None    Disposition:  The patient was discharged to home.     Impression & Plan       Medical Decision Making:  Symptoms most consistent with musculoskeletal back pain.    Chronic conditions complicating -chronic back pain.    Differential diagnosis considered, but not limited to fracture, cauda equina, epidural abscess, but evaluation not consistent with these etiologies.     Vital signs initially noted for hypertension but patient was in intense pain and improved  after pain was controlled.    No red flags for back pain and thus considered advanced CT/MRI imaging, but not emergently indicated.  Furthermore, patient states she just had an MRI last week.  Cannot see the results in our system.    Given IM Toradol, Tylenol, and p.o. Valium.  Afterwards patient able to ambulate and she is resting and appears comfortable.    Recommended physical therapy exercises.     Prescription medication considered, but ultimately management most appropriate with NSAIDs.  Prescribed Lidoderm patches.    Patient was evaluated for acute medical emergencies. Based on my clinical assessment, I do not think any further acute management or work-up is required.  Patient stable for discharge. Given strict return precautions. All questions answered. Patient content with plan. Recommended PCP follow-up in 2-3 days.        Critical Care time:  was 0 minutes for this patient excluding procedures.    Diagnosis:    ICD-10-CM    1. Chronic left-sided low back pain with left-sided sciatica  M54.42     G89.29            Discharge Medications:  New Prescriptions    LIDOCAINE (LIDODERM) 5 % PATCH    Place 1 patch onto the skin every 24 hours for 10 days      Scribe Disclosure:  NU, Juila Fierro, am serving as a scribe at 7:18 AM on 7/19/2023 to document services personally performed by Lazaro Crespo MD based on my observations and the provider's statements to me.     7/19/2023   Lazaro Crespo MD Foss, Kevin, MD  07/19/23 0820

## 2023-07-19 NOTE — ED TRIAGE NOTES
Pt has hx of disc problem. She was given a cortisone shot yesterday for pain and comes in with severe back pain. Pt is hyperventilating and yelling in triage due to pain. She states she may have pooped herself but she couldn't help it.

## 2023-07-31 DIAGNOSIS — F33.1 MAJOR DEPRESSIVE DISORDER, RECURRENT EPISODE, MODERATE (H): ICD-10-CM

## 2023-08-02 ENCOUNTER — TRANSFERRED RECORDS (OUTPATIENT)
Dept: HEALTH INFORMATION MANAGEMENT | Facility: CLINIC | Age: 63
End: 2023-08-02

## 2023-08-02 RX ORDER — DULOXETIN HYDROCHLORIDE 60 MG/1
60 CAPSULE, DELAYED RELEASE ORAL DAILY
Qty: 90 CAPSULE | Refills: 0 | Status: SHIPPED | OUTPATIENT
Start: 2023-08-02 | End: 2023-09-13

## 2023-08-02 NOTE — TELEPHONE ENCOUNTER
"Routing refill request to provider for review/approval because:  Phq 9, bp out of range    Last Written Prescription Date:  5/4/23  Last Fill Quantity: 90,  # refills: 0   Last office visit provider:  1/9/23     Requested Prescriptions   Pending Prescriptions Disp Refills    DULoxetine (CYMBALTA) 60 MG capsule 90 capsule 0     Sig: Take 1 capsule (60 mg) by mouth daily       Serotonin-Norepinephrine Reuptake Inhibitors  Failed - 7/31/2023 12:03 PM        Failed - Blood pressure under 140/90 in past 12 months     BP Readings from Last 3 Encounters:   07/19/23 (!) 145/83   07/03/23 138/83   03/28/23 121/81                 Failed - PHQ-9 score of less than 5 in past 6 months     Please review last PHQ-9 score.           Failed - Recent (6 mo) or future (30 days) visit within the authorizing provider's specialty     Patient had office visit in the last 6 months or has a visit in the next 30 days with authorizing provider or within the authorizing provider's specialty.  See \"Patient Info\" tab in inbasket, or \"Choose Columns\" in Meds & Orders section of the refill encounter.            Passed - Medication is active on med list        Passed - Patient is age 18 or older        Passed - No active pregnancy on record        Passed - No positive pregnancy test in past 12 months             Sofía Murphy RN 08/02/23 6:15 PM  "

## 2023-08-14 ENCOUNTER — TRANSFERRED RECORDS (OUTPATIENT)
Dept: HEALTH INFORMATION MANAGEMENT | Facility: CLINIC | Age: 63
End: 2023-08-14

## 2023-08-24 ENCOUNTER — TRANSFERRED RECORDS (OUTPATIENT)
Dept: HEALTH INFORMATION MANAGEMENT | Facility: CLINIC | Age: 63
End: 2023-08-24

## 2023-09-11 ENCOUNTER — TRANSFERRED RECORDS (OUTPATIENT)
Dept: HEALTH INFORMATION MANAGEMENT | Facility: CLINIC | Age: 63
End: 2023-09-11

## 2023-09-12 ASSESSMENT — ENCOUNTER SYMPTOMS
HEADACHES: 0
PARESTHESIAS: 0
ARTHRALGIAS: 0
MYALGIAS: 0
WEAKNESS: 0
NAUSEA: 0
SORE THROAT: 0
HEARTBURN: 0
DYSURIA: 0
HEMATOCHEZIA: 0
FREQUENCY: 0
DIZZINESS: 0
FEVER: 0
CHILLS: 0
EYE PAIN: 0
HEMATURIA: 0
COUGH: 0
DIARRHEA: 1
NERVOUS/ANXIOUS: 0
SHORTNESS OF BREATH: 0
JOINT SWELLING: 0
BREAST MASS: 0
CONSTIPATION: 0
PALPITATIONS: 0
ABDOMINAL PAIN: 0

## 2023-09-12 ASSESSMENT — PATIENT HEALTH QUESTIONNAIRE - PHQ9
SUM OF ALL RESPONSES TO PHQ QUESTIONS 1-9: 0
SUM OF ALL RESPONSES TO PHQ QUESTIONS 1-9: 0
10. IF YOU CHECKED OFF ANY PROBLEMS, HOW DIFFICULT HAVE THESE PROBLEMS MADE IT FOR YOU TO DO YOUR WORK, TAKE CARE OF THINGS AT HOME, OR GET ALONG WITH OTHER PEOPLE: NOT DIFFICULT AT ALL

## 2023-09-13 ENCOUNTER — OFFICE VISIT (OUTPATIENT)
Dept: FAMILY MEDICINE | Facility: CLINIC | Age: 63
End: 2023-09-13
Payer: COMMERCIAL

## 2023-09-13 VITALS
DIASTOLIC BLOOD PRESSURE: 72 MMHG | RESPIRATION RATE: 18 BRPM | HEIGHT: 63 IN | OXYGEN SATURATION: 100 % | BODY MASS INDEX: 22.47 KG/M2 | SYSTOLIC BLOOD PRESSURE: 118 MMHG | HEART RATE: 71 BPM | WEIGHT: 126.8 LBS | TEMPERATURE: 97.7 F

## 2023-09-13 DIAGNOSIS — R63.4 WEIGHT LOSS: ICD-10-CM

## 2023-09-13 DIAGNOSIS — R19.7 DIARRHEA, UNSPECIFIED TYPE: ICD-10-CM

## 2023-09-13 DIAGNOSIS — I10 HYPERTENSION GOAL BP (BLOOD PRESSURE) < 140/90: ICD-10-CM

## 2023-09-13 DIAGNOSIS — Z12.31 VISIT FOR SCREENING MAMMOGRAM: ICD-10-CM

## 2023-09-13 DIAGNOSIS — F33.1 MAJOR DEPRESSIVE DISORDER, RECURRENT EPISODE, MODERATE (H): ICD-10-CM

## 2023-09-13 DIAGNOSIS — I47.10 PAROXYSMAL SUPRAVENTRICULAR TACHYCARDIA (H): ICD-10-CM

## 2023-09-13 DIAGNOSIS — B00.1 COLD SORE: ICD-10-CM

## 2023-09-13 DIAGNOSIS — Z00.00 ROUTINE GENERAL MEDICAL EXAMINATION AT A HEALTH CARE FACILITY: Primary | ICD-10-CM

## 2023-09-13 LAB
ANION GAP SERPL CALCULATED.3IONS-SCNC: 13 MMOL/L (ref 7–15)
BUN SERPL-MCNC: 12 MG/DL (ref 8–23)
CALCIUM SERPL-MCNC: 9.7 MG/DL (ref 8.8–10.2)
CHLORIDE SERPL-SCNC: 103 MMOL/L (ref 98–107)
CHOLEST SERPL-MCNC: 217 MG/DL
CREAT SERPL-MCNC: 0.56 MG/DL (ref 0.51–0.95)
DEPRECATED HCO3 PLAS-SCNC: 24 MMOL/L (ref 22–29)
EGFRCR SERPLBLD CKD-EPI 2021: >90 ML/MIN/1.73M2
GLUCOSE SERPL-MCNC: 101 MG/DL (ref 70–99)
HDLC SERPL-MCNC: 76 MG/DL
LDLC SERPL CALC-MCNC: 111 MG/DL
NONHDLC SERPL-MCNC: 141 MG/DL
POTASSIUM SERPL-SCNC: 4.8 MMOL/L (ref 3.4–5.3)
SODIUM SERPL-SCNC: 140 MMOL/L (ref 136–145)
T4 FREE SERPL-MCNC: 1.07 NG/DL (ref 0.9–1.7)
TRIGL SERPL-MCNC: 150 MG/DL
TSH SERPL DL<=0.005 MIU/L-ACNC: 4.52 UIU/ML (ref 0.3–4.2)

## 2023-09-13 PROCEDURE — 99396 PREV VISIT EST AGE 40-64: CPT | Mod: 25 | Performed by: NURSE PRACTITIONER

## 2023-09-13 PROCEDURE — 36415 COLL VENOUS BLD VENIPUNCTURE: CPT | Performed by: NURSE PRACTITIONER

## 2023-09-13 PROCEDURE — 80061 LIPID PANEL: CPT | Performed by: NURSE PRACTITIONER

## 2023-09-13 PROCEDURE — 82784 ASSAY IGA/IGD/IGG/IGM EACH: CPT | Performed by: NURSE PRACTITIONER

## 2023-09-13 PROCEDURE — 80048 BASIC METABOLIC PNL TOTAL CA: CPT | Performed by: NURSE PRACTITIONER

## 2023-09-13 PROCEDURE — 84443 ASSAY THYROID STIM HORMONE: CPT | Performed by: NURSE PRACTITIONER

## 2023-09-13 PROCEDURE — 86258 DGP ANTIBODY EACH IG CLASS: CPT | Performed by: NURSE PRACTITIONER

## 2023-09-13 PROCEDURE — 99213 OFFICE O/P EST LOW 20 MIN: CPT | Mod: 25 | Performed by: NURSE PRACTITIONER

## 2023-09-13 PROCEDURE — 84439 ASSAY OF FREE THYROXINE: CPT | Performed by: NURSE PRACTITIONER

## 2023-09-13 RX ORDER — LOSARTAN POTASSIUM 100 MG/1
100 TABLET ORAL DAILY
Qty: 90 TABLET | Refills: 3 | Status: SHIPPED | OUTPATIENT
Start: 2023-09-13

## 2023-09-13 RX ORDER — VERAPAMIL HYDROCHLORIDE 240 MG/1
TABLET, FILM COATED, EXTENDED RELEASE ORAL
Qty: 90 TABLET | Refills: 3 | Status: SHIPPED | OUTPATIENT
Start: 2023-09-13

## 2023-09-13 RX ORDER — DULOXETIN HYDROCHLORIDE 60 MG/1
60 CAPSULE, DELAYED RELEASE ORAL DAILY
Qty: 90 CAPSULE | Refills: 3 | Status: SHIPPED | OUTPATIENT
Start: 2023-09-13

## 2023-09-13 RX ORDER — VALACYCLOVIR HYDROCHLORIDE 1 G/1
2000 TABLET, FILM COATED ORAL 2 TIMES DAILY PRN
Qty: 20 TABLET | Status: SHIPPED | OUTPATIENT
Start: 2023-09-13

## 2023-09-13 ASSESSMENT — ENCOUNTER SYMPTOMS
JOINT SWELLING: 0
PARESTHESIAS: 0
MYALGIAS: 0
HEMATURIA: 0
ARTHRALGIAS: 0
WEAKNESS: 0
FEVER: 0
PALPITATIONS: 0
HEMATOCHEZIA: 0
NAUSEA: 0
DIARRHEA: 1
HEARTBURN: 0
FREQUENCY: 0
COUGH: 0
CHILLS: 0
ABDOMINAL PAIN: 0
CONSTIPATION: 0
EYE PAIN: 0
SORE THROAT: 0
NERVOUS/ANXIOUS: 0
SHORTNESS OF BREATH: 0
HEADACHES: 0
DIZZINESS: 0
BREAST MASS: 0
DYSURIA: 0

## 2023-09-13 ASSESSMENT — PAIN SCALES - GENERAL: PAINLEVEL: MODERATE PAIN (4)

## 2023-09-13 NOTE — PROGRESS NOTES
SUBJECTIVE:   CC: Kerri is an 63 year old who presents for preventive health visit.       2023     9:36 AM   Additional Questions   Roomed by Rhea   Accompanied by Self         2023     9:36 AM   Patient Reported Additional Medications   Patient reports taking the following new medications Pain/ Tramodil as needed       Healthy Habits:     Getting at least 3 servings of Calcium per day:  Yes    Bi-annual eye exam:  NO    Dental care twice a year:  Yes    Sleep apnea or symptoms of sleep apnea:  None    Diet:  Regular (no restrictions)    Frequency of exercise:  4-5 days/week    Duration of exercise:  15-30 minutes    Taking medications regularly:  Yes    Medication side effects:  None    Additional concerns today:  No    Seeing a spine specialist for low back pain and radiculitis.  She has been getting PT, chiropractic, epidural injections.    Her blood pressure has been well-controlled.    Her mood is stable.    She did see GI back in the early spring, needs labs done.        Today's PHQ-9 Score:       2023    10:52 AM   PHQ-9 SCORE   PHQ-9 Total Score MyChart 0   PHQ-9 Total Score 0                       Social History     Tobacco Use    Smoking status: Former     Packs/day: 0.00     Years: 0.00     Pack years: 0.00     Types: Cigarettes     Quit date: 2023     Years since quittin.0    Smokeless tobacco: Never    Tobacco comments:     3 per week    Substance Use Topics    Alcohol use: Yes     Comment: a couple drinks 3 times per week              2023    10:55 AM   Alcohol Use   Prescreen: >3 drinks/day or >7 drinks/week? No     Reviewed orders with patient.  Reviewed health maintenance and updated orders accordingly - Yes      Breast Cancer Screening:    FHS-7:       2021     8:42 AM 2022     4:11 PM 2022     8:45 AM 2023    10:57 AM   Breast CA Risk Assessment (FHS-7)   Did any of your first-degree relatives have breast or ovarian cancer? No No No No   Did any  of your relatives have bilateral breast cancer? No No No No   Did any man in your family have breast cancer? No No No No   Did any woman in your family have breast and ovarian cancer? No No No No   Did any woman in your family have breast cancer before age 50 y? No No Unknown No   Do you have 2 or more relatives with breast and/or ovarian cancer? No No No Yes   Do you have 2 or more relatives with breast and/or bowel cancer? No No No No         Pertinent mammograms are reviewed under the imaging tab.    History of abnormal Pap smear: NO - age 30-65 PAP every 5 years with negative HPV co-testing recommended      Latest Ref Rng & Units 6/17/2019     9:52 AM 6/17/2019     9:46 AM 7/13/2015    12:00 AM   PAP / HPV   PAP (Historical)   NIL  NIL    HPV 16 DNA NEG^Negative Negative      HPV 18 DNA NEG^Negative Negative      Other HR HPV NEG^Negative Negative        Reviewed and updated as needed this visit by clinical staff   Tobacco  Allergies  Meds              Reviewed and updated as needed this visit by Provider                     Review of Systems   Constitutional:  Negative for chills and fever.   HENT:  Negative for congestion, ear pain, hearing loss and sore throat.    Eyes:  Negative for pain and visual disturbance.   Respiratory:  Negative for cough and shortness of breath.    Cardiovascular:  Negative for chest pain, palpitations and peripheral edema.   Gastrointestinal:  Positive for diarrhea. Negative for abdominal pain, constipation, heartburn, hematochezia and nausea.   Breasts:  Negative for tenderness, breast mass and discharge.   Genitourinary:  Negative for dysuria, frequency, genital sores, hematuria, pelvic pain, urgency, vaginal bleeding and vaginal discharge.   Musculoskeletal:  Negative for arthralgias, joint swelling and myalgias.   Skin:  Negative for rash.   Neurological:  Negative for dizziness, weakness, headaches and paresthesias.   Psychiatric/Behavioral:  Negative for mood changes. The  "patient is not nervous/anxious.           OBJECTIVE:   /72 (BP Location: Right arm, Patient Position: Sitting, Cuff Size: Adult Regular)   Pulse 71   Temp 97.7  F (36.5  C) (Temporal)   Resp 18   Ht 1.605 m (5' 3.19\")   Wt 57.5 kg (126 lb 12.8 oz)   LMP  (LMP Unknown)   SpO2 100%   BMI 22.33 kg/m    Physical Exam  GENERAL: healthy, alert and no distress  EYES: Eyes grossly normal to inspection, PERRL and conjunctivae and sclerae normal  HENT: ear canals and TM's normal, nose and mouth without ulcers or lesions  NECK: no adenopathy, no asymmetry, masses, or scars and thyroid normal to palpation  RESP: lungs clear to auscultation - no rales, rhonchi or wheezes  CV: regular rate and rhythm, normal S1 S2, no S3 or S4, no murmur, click or rub, no peripheral edema and peripheral pulses strong  ABDOMEN: soft, nontender, no hepatosplenomegaly, no masses and bowel sounds normal  MS: no gross musculoskeletal defects noted, no edema  SKIN: no suspicious lesions or rashes  NEURO: Normal strength and tone, mentation intact and speech normal  PSYCH: mentation appears normal, affect normal/bright        ASSESSMENT/PLAN:   (Z00.00) Routine general medical examination at a health care facility  (primary encounter diagnosis)  Comment:   Plan: Lipid panel reflex to direct LDL Fasting            (B00.1) Cold sore  Comment:   Plan: valACYclovir (VALTREX) 1000 mg tablet        The current medical regimen is effective;  continue present plan and medications.     (Z12.31) Visit for screening mammogram  Comment:   Plan: MA SCREENING DIGITAL BILAT - Future  (s+30)            (I10) Hypertension goal BP (blood pressure) < 140/90  Comment: at goal  Plan: losartan (COZAAR) 100 MG tablet, verapamil ER         (CALAN-SR) 240 MG CR tablet, Basic metabolic         panel  (Ca, Cl, CO2, Creat, Gluc, K, Na, BUN)        The current medical regimen is effective;  continue present plan and medications.     (I47.1) Paroxysmal supraventricular " tachycardia (H)  Comment: stable  Plan: verapamil ER (CALAN-SR) 240 MG CR tablet        The current medical regimen is effective;  continue present plan and medications.     (F33.1) Major depressive disorder, recurrent episode, moderate (H)  Comment: stable  Plan: DULoxetine (CYMBALTA) 60 MG capsule        The current medical regimen is effective;  continue present plan and medications.     (R19.7) Diarrhea, unspecified type  Comment: stable  Plan: Tissue transglutaminase franko IgA and IgG,         CANCELED: Tissue transglutaminase franko IgA and         IgG        Labs per GI.     (R63.4) Weight loss  Comment:   Plan: Tissue transglutaminase franko IgA and IgG,         CANCELED: Tissue transglutaminase franko IgA and         IgG        Labs per GI.           COUNSELING:  Reviewed preventive health counseling, as reflected in patient instructions        She reports that she quit smoking 12 days ago. Her smoking use included cigarettes. She has never used smokeless tobacco.          Mira Draper NP  [facility] submitted by the patient for this visit:  Patient Health Questionnaire (Submitted on 9/12/2023)  If you checked off any problems, how difficult have these problems made it for you to do your work, take care of things at home, or get along with other people?: Not difficult at all  PHQ9 TOTAL SCORE: 0

## 2023-09-14 LAB
GLIADIN IGA SER-ACNC: 1.2 U/ML
GLIADIN IGG SER-ACNC: <0.6 U/ML
IGA SERPL-MCNC: 255 MG/DL (ref 84–499)

## 2023-09-20 ENCOUNTER — TRANSFERRED RECORDS (OUTPATIENT)
Dept: HEALTH INFORMATION MANAGEMENT | Facility: CLINIC | Age: 63
End: 2023-09-20

## 2023-09-26 ENCOUNTER — TRANSFERRED RECORDS (OUTPATIENT)
Dept: HEALTH INFORMATION MANAGEMENT | Facility: CLINIC | Age: 63
End: 2023-09-26

## 2023-09-27 ENCOUNTER — MEDICAL CORRESPONDENCE (OUTPATIENT)
Dept: HEALTH INFORMATION MANAGEMENT | Facility: CLINIC | Age: 63
End: 2023-09-27
Payer: COMMERCIAL

## 2023-09-28 ENCOUNTER — TRANSCRIBE ORDERS (OUTPATIENT)
Dept: OTHER | Age: 63
End: 2023-09-28

## 2023-09-28 DIAGNOSIS — M53.3 SACRAL MASS: Primary | ICD-10-CM

## 2023-09-29 ENCOUNTER — TELEPHONE (OUTPATIENT)
Dept: ORTHOPEDICS | Facility: CLINIC | Age: 63
End: 2023-09-29
Payer: COMMERCIAL

## 2023-09-29 NOTE — TELEPHONE ENCOUNTER
[facility] Call Center    Phone Message    May a detailed message be left on voicemail: yes     Reason for Call: Other: Hello, Can you please review records, I am not sure if this Ortho Oncology or Spine? Thank you, Daphne     Action Taken: Other: [facility]    Travel Screening: Not Applicable

## 2023-10-03 ENCOUNTER — TELEPHONE (OUTPATIENT)
Dept: ORTHOPEDICS | Facility: CLINIC | Age: 63
End: 2023-10-03
Payer: COMMERCIAL

## 2023-10-05 ENCOUNTER — TELEPHONE (OUTPATIENT)
Dept: NEUROSURGERY | Facility: CLINIC | Age: 63
End: 2023-10-05

## 2023-10-05 ENCOUNTER — PRE VISIT (OUTPATIENT)
Dept: NEUROSURGERY | Facility: CLINIC | Age: 63
End: 2023-10-05

## 2023-10-05 ENCOUNTER — OFFICE VISIT (OUTPATIENT)
Dept: NEUROSURGERY | Facility: CLINIC | Age: 63
End: 2023-10-05
Payer: COMMERCIAL

## 2023-10-05 VITALS
DIASTOLIC BLOOD PRESSURE: 82 MMHG | SYSTOLIC BLOOD PRESSURE: 141 MMHG | OXYGEN SATURATION: 98 % | HEIGHT: 64 IN | BODY MASS INDEX: 21.34 KG/M2 | HEART RATE: 77 BPM | WEIGHT: 125 LBS

## 2023-10-05 DIAGNOSIS — M71.30 SYNOVIAL CYST: Primary | ICD-10-CM

## 2023-10-05 DIAGNOSIS — M53.3 SACRAL MASS: ICD-10-CM

## 2023-10-05 DIAGNOSIS — M71.38 SYNOVIAL CYST OF SACRAL REGION: ICD-10-CM

## 2023-10-05 PROCEDURE — 99204 OFFICE O/P NEW MOD 45 MIN: CPT | Performed by: NEUROLOGICAL SURGERY

## 2023-10-05 ASSESSMENT — PAIN SCALES - GENERAL: PAINLEVEL: MODERATE PAIN (5)

## 2023-10-05 NOTE — PATIENT INSTRUCTIONS
Patient Instructions    Surgery scheduled at [facility] for left sacral lamninectomy for resection of synovial cyst with Dr. Braxton    Pre-Operative    Surgical risks: blood clots in the leg or lung, problems urinating, nerve damage, drainage from the incision, infection, stiffness    You will need a Pre-operative physical with primary care physician within 30 days prior to your surgical date.     This is a same day procedure with discharge home day of surgery.    Smoking Cessation  You are advised to quit smoking immediately through recovery to help with healing and reduce risk of complications. Printout given.     Shower procedure  You will need to shower the night before and morning of surgery using the antimicrobial soap (chlorhexidine) given to you. Please refer to showering instruction sheet in surgery education folder.    Eating/Drinking  Stop all solid foods 8 hours before surgery.  Keep drinking clear liquids until 4 hours before surgery  Clear liquids include water, clear juice, black coffee, or clear tea without milk, Gatorade, clear soda.     Medications  Discontinue Aspirin & NSAIDs (Advil/Ibuprofen, Indocin, Naproxen,Nuprin,Relafen/Nabumetone, Diclofenac,Meloxicam, Aleve, Celebrex) 7 days prior to surgical date. After surgery, do not begin taking these medications until given clearance as it may cause bleeding and interfere with healing.  It is ok to take Tylenol (Acetaminophen) for pain within the 7 days prior to surgery. Example: you could take 1000 mg 3 times per day. Do not exceed 3,000 mg per day.   If you are on chronic pain medication (oxycodone, Percocet, hydrocodone, Vicodin, Norco, Dilaudid, morphine, MS Contin, naltrexone, Suboxone, etc) or have a pain contract we will reach out to your pain clinic to gather your most recent records and recommendations for pain management post-op.  Please ask your provider who manages your chronic pain if they require you to schedule  an office visit prior to surgery. Continue obtaining your pain medications from your current provider until surgery. Our team will manage your acute post-op pain in the hospital and during the recovery period. Your pain team will continue to manage your chronic pain.   Any other medications prescribed, please discuss with your primary care provider at your pre-operative physical       Post-Operative    Incision Care  Look at your incision site every day. You  may need a mirror or family member to help you.   Watch for signs of infection  Redness, swelling, warmth, drainage (Green or yellow drainage (pus) from your incision or increased bloody drainage), and fever of 101 degrees or higher  [facility] [phone]  Remove dressing as instructed upon discharge  Do not apply lotions or ointments to incision  It is okay to shower, just pat the incision dry   No submerging incision in water such as pools, hot tubs, or baths for at least 8 weeks and until the incision is healed    Pain Management  Dealing with pain  As your body heals, you might feel a stabbing, burning, or aching pain. You may also have some numbness.  Everyone feels pain differently, we may ask you to rate your pain using a pain scale. This will let us know how much pain you feel.   Keep in mind that medicine won't take away all of your pain. It helps to try other ways to relax and ease pain.   Things to help with pain  After surgery, we will give you medicine for your pain. These medications work well, but they can make you drowsy, itchy, or sick to your stomach. If we give you narcotics for pain, try to take the pills with food.   For mild to moderate pain, you can take medication such as Tylenol. These can be used with narcotics, but make sure that your narcotic does not contain Tylenol.   Do NOT drive while taking narcotic pain medication  Do NOT drink alcohol while using any pain medication  You can utilize ice as needed (no longer than 20  minutes at one time)  Refills of pain medication: please call the neurosurgery clinic to request 2-3 days before you run out  Aspirin & NSAIDS (ex. Ibuprofen, aleve, naproxen): Don't take NSAIDs until 2 weeks after surgery to reduce risk of bleeding and interference with bone healing     Bowel Care  Many people have constipation (hard stools) after surgery. The narcotic pain medication we often prescribed can contribute to constipation. To help prevent constipation: Drink plenty of fluid (8-10 glasses/day); Eat more fiber, such as whole grain bread, bran cereal, and fruits and vegetables; Stay active by walking; Over the counter stool softener may also help.      Activity Restrictions  For the first 6 weeks, no lifting > 10 pounds, limited bending, twisting, or overhead reaching.  Take stairs in moderation   Walking is the best way to start exercise after surgery. Take short frequent walks. You may gradually increase the distance as tolerated. If you feel pain, decrease your activity, but DO NOT stop walking. Walking will help you gain strength, prevent muscle soreness and spasms, and prevent blood clots.  Avoid bed rest and prolonged sitting for longer than 30 minutes (change positions frequently while awake)  No contact sports or high impact activities such as; running/jogging, snowmobile or 4 mathew riding or any other recreational vehicles until after given clearance at one of your follow up visits    Contact clinic right away or go to the Emergency Department if you develop:   Infection (incisional redness, swelling, warmth, drainage, or fever (temp > 101 F))  New injury  Bladder or bowel changes or loss of control    Signs of blood clot:  Swelling and/or warmth in one or both legs  Pain or tenderness in your leg, ankle, foot, or arm   Red or discolored skin     Go to the Emergency Department   If sudden onset of severe headache, weakness, confusion, change in level of consciousness, pain, or loss of  "movement.  Chest pain  Trouble breathing     Post-Op Follow Up Appointments  2 week incision check & staple/suture removal with a Neurosurgery Nurse  6 week and 3 month post-op visit with Physican Assistant or Nurse Practitioner     Resources  If you are currently employed, you will need to be off work for 2-4 weeks for recovery and healing.  Please fax any FMLA/short term disability paperwork to [phone] prior to surgery  You may call our clinic when you'd like to return to work and we can provide a work letter  To allow staff adequate time to complete paperwork, please send as soon as possible     [facility]  [facility] - [address] [address]  Telephone:  [phone]       Fax:  [phone]  Ways to Quit Smoking     It's not too late to quit smoking.   Quitting smoking helps your circulation, your stamina, your   skin, and your general health. Your risk for coronary heart   disease, a common cause of death and disability, is halved   after only a year without smoking. Quitting smoking also   reduces the likelihood of your getting respiratory problems   and lung cancer.     Studies have shown that your smoke affects others as well as   yourself. Children of parents who smoke around the house are   more prone to respiratory infections than children from   nonsmoking homes.     Smoking is an addictive habit. Most former smokers make   several attempts to quit before they are finally successful.   So, never say, \"I can't.\" Just keep trying.     Set a quit date.   Set a date for when you will stop smoking. Don't buy   cigarettes to carry you beyond your last day. Tell your   family and friends you plan to quit, and ask for their   support and encouragement. Ask them not to offer you   cigarettes.     Throw your cigarettes away.   If you keep cigarettes around, sooner or later you'll break   down and smoke one, " "then another, then another, and so on.   Throw them away. Make it less easy to start again.     Try chewing gum is as a substitute for cigarettes.     Spend time with nonsmokers rather than with smokers.   Think of yourself and identify yourself as a nonsmoker (for   example, in restaurants). Stay away from \"smokers' franko,\"   such as bars. Avoid spending time with smokers. You can't   tell others not to smoke, but you don't have to sit with them   while they do. Old habits die hard and one of your old   smoking buddies is sure to offer you a cigarette. Plan on   walking away from cigarette smoke. Spend time with nonsmokers   and sit in the nonsmoking section of restaurants.     Start an exercise program.   As you become more fit, you will not want the nicotine   effects in your body. Regular exercise will also help fight   the tendency to gain weight when you quit smoking.     Keep your hands busy.   You may find you don't know what to do with your hands for a   while.  a book or a magazine. Try knitting,   needlework, pottery, drawing, making a plastic model, or   doing a jigsaw puzzle. Join special interest groups that keep   you involved in your hobby.     Take on new activities.   Take on new activities that don't include smoking. Join an   exercise group and work out regularly. Sign up for an evening   class or a join a study group at your place of Jew. Go on   more outings with your family or friends. Learn ways to relax   and manage stress.     Join quit-smoking programs if it helps.   Some people do better in groups, or with a set of   instructions to follow. That's fine, too. Remember, the aim   is to quit smoking. It doesn't matter how you do it.     Consider using nicotine gum or nicotine patches.   Nicotine is the drug that is in tobacco. You can use nicotine   patches or gum, available without a prescription at your   local pharmacy, to quit smoking. It is a two-step process.   First you " learn to live without smoking, but not without   nicotine. Then, as you graduate to patches with less   nicotine, or chew less of the nicotine gum, you wean yourself   off the nicotine.     Think about asking your doctor for a prescription medicine.   There are medicines available, such as Zyban, to help you   quit.     You may prefer to be involved in an organized quit-smoking   program while you are using nicotine patches or gum or other   medicine to help you quit. None of these treatments is a   miracle cure. You still need to learn to live without   cigarettes in your daily life.

## 2023-10-05 NOTE — TELEPHONE ENCOUNTER
NEUROSURGERY- NEW PREVISIT PLANNING       Record Status/Location     Referring Provider Referral Edgard Slaughter MD    Diagnosis Referral M53.3 (ICD-10-CM) - Sacral mass    MRI (HEAD, NECK, SPINE) Pacs Lumbar 9/20/23 [facility]   CT Na    X-ray Na    INJECTION Na    PHYSICAL THERAPY Na    SURGERY Na      FUTURE VISIT INFORMATION    Consult  FUTURE VISIT INFORMATION:  Appt made 10/04/2023    Date: 10/05/23  Time: 10:20am  Location:        Outside Records      Clinic name Comments Records Status Imaging Status   [facility] MRI report, OV notes and records Requested

## 2023-10-05 NOTE — PROGRESS NOTES
Faxed OV note and letter to [employer] at [phone]. Verified via RightFax.    Need to fax employer the same once they call back with the fax number.    Maude Chery RN on 10/5/2023 at 2:03 PM

## 2023-10-05 NOTE — PROGRESS NOTES
I was asked by Dr. Slaughter to see this patient in consultation    63 year old female with left S1 foraminal lesion abutting the nerve root, severe left leg pain.  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  Severe throbbing pain in the left buttocks, radiating to the calf, plantar foot, and last three toes.  Has been unable to walk significant distances, golf, or work, and most daily activities are markedly limited.  Did PT, Chiropractic care, and 2 epidurals without improvement.  MR Lumbar, personally reviewed, with left S1 foraminal lesion with some enhancement, with compression of the S1 root, felt to be likely synovial cyst, cannot rule out nerve sheath tumor.       Past Medical History:   Diagnosis Date    ASCUS on Pap smear 11/30/2010    colp wnl    Cataract, right eye 06/17/2017    Depressive disorder     Diabetes (H)     h/o gestational    Hypertension     SVT (supraventricular tachycardia) 2008     Past Surgical History:   Procedure Laterality Date    APPENDECTOMY      CATARACT IOL, RT/LT Right 08/24/2018    CHOLECYSTECTOMY      COLONOSCOPY N/A 7/29/2021    Procedure: COLONOSCOPY, WITH POLYPECTOMY AND BIOPSY;  Surgeon: Po Constantino MD;  Location:  GI    COLONOSCOPY N/A 3/8/2023    Procedure: COLONOSCOPY, WITH BIOPSY;  Surgeon: Leventhal, Thomas Michael, MD;  Location: [facility] OR    PHACOEMULSIFICATION WITH STANDARD INTRAOCULAR LENS IMPLANT Right 8/22/2018    Procedure: PHACOEMULSIFICATION WITH STANDARD INTRAOCULAR LENS IMPLANT;  Right Eye Phacoemulsification with Standard Intraocular Lens;  Surgeon: Johny Daniels MD;  Location:  OR     Social History     Socioeconomic History    Marital status:      Spouse name: Not on file    Number of children: 2    Years of education: Not on file    Highest education level: Not on file   Occupational History     Employer: [employer]   Tobacco Use    Smoking status: Former     Packs/day: 0.00     Years: 0.00     Pack  years: 0.00     Types: Cigarettes     Quit date: 2023     Years since quittin.0    Smokeless tobacco: Never    Tobacco comments:     3 per week    Vaping Use    Vaping Use: Never used   Substance and Sexual Activity    Alcohol use: Yes     Comment: a couple drinks 3 times per week     Drug use: No    Sexual activity: Yes     Partners: Male     Birth control/protection: None   Other Topics Concern    Parent/sibling w/ CABG, MI or angioplasty before 65F 55M? No   Social History Narrative    Caffeine intake/servings daily - 1    Calcium intake/servings daily - sup    Exercise 7 times weekly - describe walk    Sunscreen used - Yes    Seatbelts used - Yes    Guns stored in the home - No    Self Breast Exam - Yes and No    Pap test up to date -  Yes    Eye exam up to date -  Yes    Dental exam up to date -  Yes    DEXA scan up to date -  No    Flex Sig/Colonoscopy up to date -  Yes,     Mammography up to date -  Yes    Immunizations reviewed and up to date - Yes    Abuse: Current or Past (Physical, Sexual or Emotional) - No    Do you feel safe in your environment - Yes    Do you cope well with stress - Yes    Do you suffer from insomnia - No    Last updated by: Dee Fitzpatrick Ma  2011                 Social Determinants of Health     Financial Resource Strain: Not on file   Food Insecurity: Not on file   Transportation Needs: Not on file   Physical Activity: Not on file   Stress: Not on file   Social Connections: Not on file   Interpersonal Safety: Not on file   Housing Stability: Not on file     Family History   Problem Relation Age of Onset    Skin Cancer Mother     C.A.D. Father         early 60's    Hypertension Father     Hypertension Sister     Alzheimer Disease Brother     Glaucoma No family hx of     Macular Degeneration No family hx of     Retinal detachment No family hx of     Amblyopia No family hx of         ROS: 10 point ROS neg other than the symptoms noted above in the HPI.    Physical Exam  BP  "(!) 141/82   Pulse 77   Ht 5' 3.5\" (1.613 m)   Wt 125 lb (56.7 kg)   LMP  (LMP Unknown)   SpO2 98%   BMI 21.80 kg/m    HEENT:  Normocephalic, atraumatic.  PERRLA.  EOM s intact.  Visual fields full to gross exam  Neck:  Supple, non-tender, without lymphadenopathy.  Heart:  No peripheral edema  Lungs:  No SOB  Abdomen:  Non-distended.   Skin:  Warm and dry.  Extremities:  No edema, cyanosis or clubbing.  Psychiatric:  No apparent distress  Musculoskeletal:  Normal bulk and tone    NEUROLOGICAL EXAMINATION:     Mental status:  Alert and Oriented x 3, speech is fluent.  Cranial nerves:  II-XII intact.   Motor:    Shoulder Abduction:  Right:  5/5   Left:  5/5  Biceps:                      Right:  5/5   Left:  5/5  Triceps:                     Right:  5/5   Left:  5/5  Wrist Extensors:       Right:  5/5   Left:  5/5  Wrist Flexors:           Right:  5/5   Left:  5/5  interosseus :            Right:  5/5   Left:  5/5  Hip Flexion:                Right: 5/5  Left:  5/5  Quadriceps:             Right:  5/5  Left:  5/5  Hamstrings:             Right:  5/5  Left:  5/5  Gastroc Soleus:        Right:  5/5  Left:  4+/5  Tib/Ant:                      Right:  5/5  Left:  5/5  EHL:                     Right:  5/5  Left:  5/5  Sensation:  Numbness in plantar foot and last 3 toes  Reflexes:  Negative Babinski.  Negative Clonus.  Negative Gonzalez's.  Coordination:  Smooth finger to nose testing.   Negative pronator drift.  Smooth tandem walking.    A/P:  63 year old female with left S1 foraminal lesion abutting the nerve root, severe left leg pain    I had a discussion with the patient, reviewing the history, symptoms, and imaging  Extensive non-surgical management without improvement  Will plan for sacral laminectomy for resection of cyst  Risks and benefits discussed, including nerve root injury, infection, hematoma, and CSF leak      "

## 2023-10-05 NOTE — LETTER
10/5/2023         RE: Kerri Timmons  [address]  [place] [address]        Dear Colleague,    Thank you for referring your patient, Kerri Timmons, to the [facility]. Please see a copy of my visit note below.    I was asked by Dr. Slaughter to see this patient in consultation    63 year old female with left S1 foraminal lesion abutting the nerve root, severe left leg pain.  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  Severe throbbing pain in the left buttocks, radiating to the calf, plantar foot, and last three toes.  Has been unable to walk significant distances, golf, or work, and most daily activities are markedly limited.  Did PT, Chiropractic care, and 2 epidurals without improvement.  MR Lumbar, personally reviewed, with left S1 foraminal lesion with some enhancement, with compression of the S1 root, felt to be likely synovial cyst, cannot rule out nerve sheath tumor.       Past Medical History:   Diagnosis Date     ASCUS on Pap smear 11/30/2010    colp wnl     Cataract, right eye 06/17/2017     Depressive disorder      Diabetes (H)     h/o gestational     Hypertension      SVT (supraventricular tachycardia) 2008     Past Surgical History:   Procedure Laterality Date     APPENDECTOMY       CATARACT IOL, RT/LT Right 08/24/2018     CHOLECYSTECTOMY       COLONOSCOPY N/A 7/29/2021    Procedure: COLONOSCOPY, WITH POLYPECTOMY AND BIOPSY;  Surgeon: Po Constantino MD;  Location:  GI     COLONOSCOPY N/A 3/8/2023    Procedure: COLONOSCOPY, WITH BIOPSY;  Surgeon: Leventhal, Thomas Michael, MD;  Location: [facility] OR     PHACOEMULSIFICATION WITH STANDARD INTRAOCULAR LENS IMPLANT Right 8/22/2018    Procedure: PHACOEMULSIFICATION WITH STANDARD INTRAOCULAR LENS IMPLANT;  Right Eye Phacoemulsification with Standard Intraocular Lens;  Surgeon: Johny Daniels MD;  Location:  OR     Social History     Socioeconomic History     Marital status:      Spouse  name: Not on file     Number of children: 2     Years of education: Not on file     Highest education level: Not on file   Occupational History     Employer: [employer]   Tobacco Use     Smoking status: Former     Packs/day: 0.00     Years: 0.00     Pack years: 0.00     Types: Cigarettes     Quit date: 2023     Years since quittin.0     Smokeless tobacco: Never     Tobacco comments:     3 per week    Vaping Use     Vaping Use: Never used   Substance and Sexual Activity     Alcohol use: Yes     Comment: a couple drinks 3 times per week      Drug use: No     Sexual activity: Yes     Partners: Male     Birth control/protection: None   Other Topics Concern     Parent/sibling w/ CABG, MI or angioplasty before 65F 55M? No   Social History Narrative    Caffeine intake/servings daily - 1    Calcium intake/servings daily - sup    Exercise 7 times weekly - describe walk    Sunscreen used - Yes    Seatbelts used - Yes    Guns stored in the home - No    Self Breast Exam - Yes and No    Pap test up to date -  Yes    Eye exam up to date -  Yes    Dental exam up to date -  Yes    DEXA scan up to date -  No    Flex Sig/Colonoscopy up to date -  Yes,     Mammography up to date -  Yes    Immunizations reviewed and up to date - Yes    Abuse: Current or Past (Physical, Sexual or Emotional) - No    Do you feel safe in your environment - Yes    Do you cope well with stress - Yes    Do you suffer from insomnia - No    Last updated by: Dee Fitzpatrick Ma  2011                 Social Determinants of Health     Financial Resource Strain: Not on file   Food Insecurity: Not on file   Transportation Needs: Not on file   Physical Activity: Not on file   Stress: Not on file   Social Connections: Not on file   Interpersonal Safety: Not on file   Housing Stability: Not on file     Family History   Problem Relation Age of Onset     Skin Cancer Mother      C.A.D. Father         early 60's     Hypertension Father       "Hypertension Sister      Alzheimer Disease Brother      Glaucoma No family hx of      Macular Degeneration No family hx of      Retinal detachment No family hx of      Amblyopia No family hx of         ROS: 10 point ROS neg other than the symptoms noted above in the HPI.    Physical Exam  BP (!) 141/82   Pulse 77   Ht 5' 3.5\" (1.613 m)   Wt 125 lb (56.7 kg)   LMP  (LMP Unknown)   SpO2 98%   BMI 21.80 kg/m    HEENT:  Normocephalic, atraumatic.  PERRLA.  EOM s intact.  Visual fields full to gross exam  Neck:  Supple, non-tender, without lymphadenopathy.  Heart:  No peripheral edema  Lungs:  No SOB  Abdomen:  Non-distended.   Skin:  Warm and dry.  Extremities:  No edema, cyanosis or clubbing.  Psychiatric:  No apparent distress  Musculoskeletal:  Normal bulk and tone    NEUROLOGICAL EXAMINATION:     Mental status:  Alert and Oriented x 3, speech is fluent.  Cranial nerves:  II-XII intact.   Motor:    Shoulder Abduction:  Right:  5/5   Left:  5/5  Biceps:                      Right:  5/5   Left:  5/5  Triceps:                     Right:  5/5   Left:  5/5  Wrist Extensors:       Right:  5/5   Left:  5/5  Wrist Flexors:           Right:  5/5   Left:  5/5  interosseus :            Right:  5/5   Left:  5/5  Hip Flexion:                Right: 5/5  Left:  5/5  Quadriceps:             Right:  5/5  Left:  5/5  Hamstrings:             Right:  5/5  Left:  5/5  Gastroc Soleus:        Right:  5/5  Left:  4+/5  Tib/Ant:                      Right:  5/5  Left:  5/5  EHL:                     Right:  5/5  Left:  5/5  Sensation:  Numbness in plantar foot and last 3 toes  Reflexes:  Negative Babinski.  Negative Clonus.  Negative Gonzalez's.  Coordination:  Smooth finger to nose testing.   Negative pronator drift.  Smooth tandem walking.    A/P:  63 year old female with left S1 foraminal lesion abutting the nerve root, severe left leg pain    I had a discussion with the patient, reviewing the history, symptoms, and imaging  Extensive " non-surgical management without improvement  Will plan for sacral laminectomy for resection of cyst  Risks and benefits discussed, including nerve root injury, infection, hematoma, and CSF leak          Again, thank you for allowing me to participate in the care of your patient.        Sincerely,        Reed Braxton MD

## 2023-10-05 NOTE — PROGRESS NOTES
Reviewed pre- and post-operative instructions as outlined in the After Visit Summary/Patient Instructions with patient.   Surgery folder was given to patient    Patient Education Topic: Procedure with Dr. Braxton     Learner(s): Patient    Knowledge Level: Basic    Readiness to Learn: Ready    Method:  Verbal explanation and Written material     Outcome: Able to verbalize instructions    Barriers to Learning: No barrier    Covid Testing: n/a    Scheduling Number: [phone]    NDI/CRISS: Confirmation of completion within last 6 months    Pain Clinic: N/A    STD/FMLA: Yes. Currently on STD with [employer]. This will be converted to LDT on 10/11/23.  Job Description: Physical Job NICU nurse at the [employer]  Time Off: 6 weeks (ok'd by Dr. Braxton). Upon RTW, patient will likely like to return at 8 hours  Today, patient needs letter excusing form work & today's office visit notes faxed to [facility] and employer (nick Monter[employer])    Leave contact info:  [facility] Claim: [id number]  Has been out of work x 3 months so far.  [facility] (nick Castillo) phone: [phone]  Fax: [phone]    Employer: [facility] [employer]. ([employer] Monter[employer])  Phone: [phone]   requesting the fax number 10/5/23 [phone]  Fax:      Patient had the opportunity for questions to be answered. Advised Patient to call clinic with any questions/concerns. Gave patient antibacterial soap for pre-surgery skin preparation.

## 2023-10-05 NOTE — TELEPHONE ENCOUNTER
Pt called in with questions about procedure -    Pt asked if she should get the flu vaccine or covid vaccine prior to surgery.     Updated pt that it is up to her but we ask that she not receive the covid vaccine 3 days pre or post op.     Pt verbalized understanding.

## 2023-10-05 NOTE — PROGRESS NOTES
"Kerri Timmons is a 63 year old female who presents for:  Chief Complaint   Patient presents with    Neurologic Problem     Sacral mass         Initial Vitals:  BP (!) 141/82   Pulse 77   Ht 5' 3.5\" (1.613 m)   Wt 125 lb (56.7 kg)   LMP  (LMP Unknown)   SpO2 98%   BMI 21.80 kg/m   Estimated body mass index is 21.8 kg/m  as calculated from the following:    Height as of this encounter: 5' 3.5\" (1.613 m).    Weight as of this encounter: 125 lb (56.7 kg).. Body surface area is 1.59 meters squared. BP completed using cuff size: regular  Moderate Pain (5)    Nursing Comments:     Shayna Lira MA    "

## 2023-10-05 NOTE — LETTER
October 5, 2023      Kerri Timmons  [address] [place] [address]        To Whom It May Concern:    Kerri Timmons  was seen on 10/5/23.  Patient scheduled for sacral laminectomy surgery on 10/17/23. Please excuse her  until re-evaluated at post-op office visit on 11/28/23 due to surgery.        Sincerely,        Reed Braxton MD

## 2023-10-06 NOTE — PROGRESS NOTES
Faxed office visit note and work excuse letter to [phone] [employer]ter[employer] (and Sejal Gary). Verified via Right Fax.    Maude Chery RN on 10/6/2023 at 10:11 AM

## 2023-10-10 ENCOUNTER — OFFICE VISIT (OUTPATIENT)
Dept: FAMILY MEDICINE | Facility: CLINIC | Age: 63
End: 2023-10-10
Payer: COMMERCIAL

## 2023-10-10 VITALS
TEMPERATURE: 98.3 F | WEIGHT: 128.9 LBS | HEART RATE: 72 BPM | RESPIRATION RATE: 18 BRPM | OXYGEN SATURATION: 99 % | DIASTOLIC BLOOD PRESSURE: 74 MMHG | SYSTOLIC BLOOD PRESSURE: 122 MMHG | BODY MASS INDEX: 22.84 KG/M2 | HEIGHT: 63 IN

## 2023-10-10 DIAGNOSIS — M71.30 SYNOVIAL CYST: ICD-10-CM

## 2023-10-10 DIAGNOSIS — Z01.818 PREOP GENERAL PHYSICAL EXAM: Primary | ICD-10-CM

## 2023-10-10 DIAGNOSIS — I10 HYPERTENSION GOAL BP (BLOOD PRESSURE) < 140/90: ICD-10-CM

## 2023-10-10 LAB
ERYTHROCYTE [DISTWIDTH] IN BLOOD BY AUTOMATED COUNT: 12.2 % (ref 10–15)
HCT VFR BLD AUTO: 40 % (ref 35–47)
HGB BLD-MCNC: 13.2 G/DL (ref 11.7–15.7)
MCH RBC QN AUTO: 34.1 PG (ref 26.5–33)
MCHC RBC AUTO-ENTMCNC: 33 G/DL (ref 31.5–36.5)
MCV RBC AUTO: 103 FL (ref 78–100)
PLATELET # BLD AUTO: 288 10E3/UL (ref 150–450)
RBC # BLD AUTO: 3.87 10E6/UL (ref 3.8–5.2)
WBC # BLD AUTO: 5.8 10E3/UL (ref 4–11)

## 2023-10-10 PROCEDURE — 90471 IMMUNIZATION ADMIN: CPT | Performed by: NURSE PRACTITIONER

## 2023-10-10 PROCEDURE — 85027 COMPLETE CBC AUTOMATED: CPT | Performed by: NURSE PRACTITIONER

## 2023-10-10 PROCEDURE — 90682 RIV4 VACC RECOMBINANT DNA IM: CPT | Performed by: NURSE PRACTITIONER

## 2023-10-10 PROCEDURE — 80053 COMPREHEN METABOLIC PANEL: CPT | Performed by: NURSE PRACTITIONER

## 2023-10-10 PROCEDURE — 90480 ADMN SARSCOV2 VAC 1/ONLY CMP: CPT | Performed by: NURSE PRACTITIONER

## 2023-10-10 PROCEDURE — 99214 OFFICE O/P EST MOD 30 MIN: CPT | Mod: 25 | Performed by: NURSE PRACTITIONER

## 2023-10-10 PROCEDURE — 91320 SARSCV2 VAC 30MCG TRS-SUC IM: CPT | Performed by: NURSE PRACTITIONER

## 2023-10-10 PROCEDURE — 36415 COLL VENOUS BLD VENIPUNCTURE: CPT | Performed by: NURSE PRACTITIONER

## 2023-10-10 ASSESSMENT — PAIN SCALES - GENERAL: PAINLEVEL: SEVERE PAIN (6)

## 2023-10-10 NOTE — H&P (VIEW-ONLY)
[address]  [address]  [place] [address]  Phone: [phone]  Fax: [phone]  Primary Provider: Juno Draper  Pre-op Performing Provider: JUNO DRAPER      PREOPERATIVE EVALUATION:  Today's date: 10/10/2023    Kerri is a 63 year old female who presents for a preoperative evaluation.      10/10/2023     1:12 PM   Additional Questions   Roomed by Rhea   Accompanied by Self         10/10/2023     1:12 PM   Patient Reported Additional Medications   Patient reports taking the following new medications None       Surgical Information:  Surgery/Procedure: resection of synovial cyst   Surgery Location: Left Sacrum S1 nerve root   Surgeon: Reed Braxton MD  Surgery Date: 10/17/2023  Time of Surgery: 200pm  Where patient plans to recover: At home with family  Fax number for surgical facility: Note does not need to be faxed, will be available electronically in Epic.          10/10/2023     2:12 PM   Pre-op Questionnaire   1. Have you ever had a heart attack or stroke? No   2. Have you ever had surgery on your heart or blood vessels, such as a stent placement, a coronary artery bypass, or surgery on an artery in your head, neck, heart, or legs? No   3. Do you have chest pain with activity? No   4. Do you have a history of  heart failure? No   5. Do you currently have a cold, bronchitis or symptoms of other infection? No   6. Do you have a cough, shortness of breath, or wheezing? No   7. Do you or anyone in your family have previous history of blood clots? No   8. Do you or does anyone in your family have a serious bleeding problem such as prolonged bleeding following surgeries or cuts? No   9. Have you ever had problems with anemia or been told to take iron pills? No   10. Have you had any abnormal blood loss such as black, tarry or bloody stools, or abnormal vaginal bleeding? No   11. Have you ever had a blood transfusion? No   12. Are you willing to have a  blood transfusion if it is medically needed before, during, or after your surgery? Yes   13. Have you or any of your relatives ever had problems with anesthesia? No   14. Do you have sleep apnea, excessive snoring or daytime drowsiness? No   15. Do you have any artifical heart valves or other implanted medical devices like a pacemaker, defibrillator, or continuous glucose monitor? No   16. Do you have artificial joints? No   17. Are you allergic to latex? No        Assessment & Plan     The proposed surgical procedure is considered INTERMEDIATE risk.    (Z01.818) Preop general physical exam  (primary encounter diagnosis)  Comment:   Plan: CBC with platelets, Comprehensive metabolic         panel (BMP + Alb, Alk Phos, ALT, AST, Total.         Bili, TP)            (M71.30) Synovial cyst  Comment:   Plan:     Hypertension  At goal  Plan: The current medical regimen is effective;  continue present plan and medications.         - No identified additional risk factors other than previously addressed    Antiplatelet or Anticoagulation Medication Instructions:   - Patient is on no antiplatelet or anticoagulation medications.    Additional Medication Instructions:   - ACE/ARB: HOLD on day of surgery (minimum 11 hours for general anesthesia).    RECOMMENDATION:  APPROVAL GIVEN to proceed with proposed procedure, without further diagnostic evaluation.      I spent a total of 30 minutes on the day of the visit.   Time spent by me doing chart review, history and exam, documentation and further activities per the note      Subjective       HPI related to upcoming procedure: Left sciatica related to sacral synovial cyst      Health Care Directive:  Patient does not have a Health Care Directive or Living Will:     Preoperative Review of :   reviewed - not currently taking pain medications she was previously prescribed      Status of Chronic Conditions:  HYPERTENSION - Patient has longstanding history of HTN , currently denies  any symptoms referable to elevated blood pressure. Specifically denies chest pain, palpitations, dyspnea, orthopnea, PND or peripheral edema. Blood pressure readings have been in normal range. Current medication regimen is as listed below. Patient denies any side effects of medication.     Review of Systems  CONSTITUTIONAL: NEGATIVE for fever, chills, change in weight  INTEGUMENTARY/SKIN: NEGATIVE for worrisome rashes, moles or lesions  EYES: NEGATIVE for vision changes or irritation  ENT/MOUTH: NEGATIVE for ear, mouth and throat problems  RESP: NEGATIVE for significant cough or SOB  CV: NEGATIVE for chest pain, palpitations or peripheral edema  GI: NEGATIVE for nausea, abdominal pain, heartburn, or change in bowel habits  : NEGATIVE for frequency, dysuria, or hematuria  MUSCULOSKELETAL:see HPI  NEURO: left foot paresthesias  ENDOCRINE: NEGATIVE for temperature intolerance, skin/hair changes  HEME: NEGATIVE for bleeding problems  PSYCHIATRIC: NEGATIVE for changes in mood or affect    Patient Active Problem List    Diagnosis Date Noted    Major depressive disorder, recurrent episode, moderate (H) 08/22/2017     Priority: Medium    Hypertension goal BP (blood pressure) < 140/90 07/19/2017     Priority: Medium    Cataract, right eye 06/17/2017     Priority: Medium    Post concussive syndrome 01/04/2012     Priority: Medium    Robinson (hard of hearing) 01/04/2012     Priority: Medium    Enthesopathy 10/12/2011     Priority: Medium     Problem list name updated by automated process. Provider to review      [facility] 09/07/2011     Priority: Medium     X-Contacted the Patient. Care Coordinator introduced self and the program. Patient declined to participate. Will not open to care coordination.             DX V65.8 REPLACED WITH [facility] (04/08/2013)      ASCUS  Pap smear; + high risk HPV DNA 01/20/2011     Priority: Medium     11/30/10: Pap - ASCUS, + HPV 58  1/20/11: [facility] - [facility] - WNL. Plan pap in 6  months.  6/13/11: Pap - NIL. Plan pap in 6 months.  1/4/12: Pap - NIL.  Plan pap in 1 yr.  5/15/14: NIL pap, neg HPV, endometrial cells noted with LMP of 5/13/14. Plan pap.   7/2015: NIL pap. Plan pap in 3 years.  6/17/19 NIL pap, Neg HPV.               CARDIOVASCULAR SCREENING; LDL GOAL LESS THAN 160 10/31/2010     Priority: Medium    Cold sore 11/12/2009     Priority: Medium    Paroxysmal supraventricular tachycardia 01/22/2009     Priority: Medium    Abnormal maternal glucose tolerance, antepartum 09/18/2006     Priority: Medium    Malaise and fatigue 09/18/2006     Priority: Medium     Problem list name updated by automated process. Provider to review      Disturbance in sleep behavior 09/18/2006     Priority: Medium     Problem list name updated by automated process. Provider to review        Past Medical History:   Diagnosis Date    ASCUS on Pap smear 11/30/2010    colp wnl    Cataract, right eye 06/17/2017    Depressive disorder     Diabetes (H)     h/o gestational    Hypertension     SVT (supraventricular tachycardia) 2008     Past Surgical History:   Procedure Laterality Date    APPENDECTOMY      CATARACT IOL, RT/LT Right 08/24/2018    CHOLECYSTECTOMY      COLONOSCOPY N/A 7/29/2021    Procedure: COLONOSCOPY, WITH POLYPECTOMY AND BIOPSY;  Surgeon: Po Constantino MD;  Location:  GI    COLONOSCOPY N/A 3/8/2023    Procedure: COLONOSCOPY, WITH BIOPSY;  Surgeon: Leventhal, Thomas Michael, MD;  Location: [facility] [place]    PHACOEMULSIFICATION WITH STANDARD INTRAOCULAR LENS IMPLANT Right 8/22/2018    Procedure: PHACOEMULSIFICATION WITH STANDARD INTRAOCULAR LENS IMPLANT;  Right Eye Phacoemulsification with Standard Intraocular Lens;  Surgeon: Johny Daniels MD;  Location: [facility] OR     Current Outpatient Medications   Medication Sig Dispense Refill    cholestyramine (QUESTRAN) 4 g packet Take 1 packet (4 g) by mouth daily 60 packet 4    DULoxetine (CYMBALTA) 60 MG capsule Take 1 capsule (60 mg) by mouth daily 90  "capsule 3    losartan (COZAAR) 100 MG tablet Take 1 tablet (100 mg) by mouth daily 90 tablet 3    MULTI-VITAMIN OR TABS as directed 100 0    penciclovir (DENAVIR) 1 % external cream APPLY EVERY 2 HOURS AS DIRECTED 5 g PRN    valACYclovir (VALTREX) 1000 mg tablet Take 2 tablets (2,000 mg) by mouth 2 times daily as needed (cold sores) 20 tablet PRN    valACYclovir (VALTREX) 500 MG tablet TAKE 1 TABLET(500 MG) BY MOUTH DAILY 90 tablet 3    verapamil ER (CALAN-SR) 240 MG CR tablet TAKE 1 TABLET(240 MG) BY MOUTH DAILY 90 tablet 3       Allergies   Allergen Reactions    No Known Drug Allergy         Social History     Tobacco Use    Smoking status: Former     Packs/day: 0.00     Years: 0.00     Pack years: 0.00     Types: Cigarettes     Quit date: 2023     Years since quittin.1    Smokeless tobacco: Never    Tobacco comments:     3 per week    Substance Use Topics    Alcohol use: Yes     Comment: a couple drinks 3 times per week        History   Drug Use No         Objective     /74 (BP Location: Right arm, Patient Position: Sitting, Cuff Size: Adult Regular)   Pulse 72   Temp 98.3  F (36.8  C) (Temporal)   Resp 18   Ht 1.606 m (5' 3.23\")   Wt 58.5 kg (128 lb 14.4 oz)   LMP  (LMP Unknown)   SpO2 99%   BMI 22.67 kg/m      Physical Exam    GENERAL APPEARANCE: healthy, alert and no distress     EYES: EOMI, PERRL     HENT: ear canals and TM's normal and nose and mouth without ulcers or lesions     NECK: no adenopathy, no asymmetry, masses, or scars and thyroid normal to palpation     RESP: lungs clear to auscultation - no rales, rhonchi or wheezes     CV: regular rates and rhythm, normal S1 S2, no S3 or S4 and no murmur, click or rub     ABDOMEN:  soft, nontender, no HSM or masses and bowel sounds normal     MS: extremities normal- no gross deformities noted, no evidence of inflammation in joints, FROM in all extremities.     SKIN: no suspicious lesions or rashes     NEURO: Normal strength and tone, and " speech normal     PSYCH: mentation appears normal. and affect normal/bright     LYMPHATICS: No cervical adenopathy    Recent Labs   Lab Test 09/13/23  1049 02/23/23  0930 01/09/23  1552   HGB  --  13.6 12.6   PLT  --  303 331    134* 142   POTASSIUM 4.8 4.0 4.0   CR 0.56 0.50* 0.58        Diagnostics:  Recent Results (from the past 24 hour(s))   CBC with platelets    Collection Time: 10/10/23  2:26 PM   Result Value Ref Range    WBC Count 5.8 4.0 - 11.0 10e3/uL    RBC Count 3.87 3.80 - 5.20 10e6/uL    Hemoglobin 13.2 11.7 - 15.7 g/dL    Hematocrit 40.0 35.0 - 47.0 %     (H) 78 - 100 fL    MCH 34.1 (H) 26.5 - 33.0 pg    MCHC 33.0 31.5 - 36.5 g/dL    RDW 12.2 10.0 - 15.0 %    Platelet Count 288 150 - 450 10e3/uL      No EKG required, no history of coronary heart disease, significant arrhythmia, peripheral arterial disease or other structural heart disease.    Revised Cardiac Risk Index (RCRI):  The patient has the following serious cardiovascular risks for perioperative complications:   - No serious cardiac risks = 0 points     RCRI Interpretation: 0 points: Class I (very low risk - 0.4% complication rate)         Signed Electronically by: Mira Draper NP  Copy of this evaluation report is provided to requesting physician.    Answers submitted by the patient for this visit:  General Questionnaire (Submitted on 10/10/2023)  Chief Complaint: Chronic problems general questions HPI Form  What is the reason for your visit today? : pre op  How many servings of fruits and vegetables do you eat daily?: 2-3  On average, how many sweetened beverages do you drink each day (Examples: soda, juice, sweet tea, etc.  Do NOT count diet or artificially sweetened beverages)?: 1  How many minutes a day do you exercise enough to make your heart beat faster?: 10 to 19  How many days a week do you exercise enough to make your heart beat faster?: 4  How many days per week do you miss taking your medication?: 1

## 2023-10-10 NOTE — PATIENT INSTRUCTIONS
Preparing for Your Surgery  Getting started  A nurse will call you to review your health history and instructions. They will give you an arrival time based on your scheduled surgery time. Please be ready to share:  Your doctor's clinic name and phone number  Your medical, surgical, and anesthesia history  A list of allergies and sensitivities  A list of medicines, including herbal treatments and over-the-counter drugs  Whether the patient has a legal guardian (ask how to send us the papers in advance)  Please tell us if you're pregnant--or if there's any chance you might be pregnant. Some surgeries may injure a fetus (unborn baby), so they require a pregnancy test. Surgeries that are safe for a fetus don't always need a test, and you can choose whether to have one.   If you have a child who's having surgery, please ask for a copy of Preparing for Your Child's Surgery.    Preparing for surgery  Within 10 to 30 days of surgery: Have a pre-op exam (sometimes called an H&P, or History and Physical). This can be done at a clinic or pre-operative center.  If you're having a , you may not need this exam. Talk to your care team.  At your pre-op exam, talk to your care team about all medicines you take. If you need to stop any medicines before surgery, ask when to start taking them again.  We do this for your safety. Many medicines can make you bleed too much during surgery. Some change how well surgery (anesthesia) drugs work.  Call your insurance company to let them know you're having surgery. (If you don't have insurance, call [phone].)  Call your clinic if there's any change in your health. This includes signs of a cold or flu (sore throat, runny nose, cough, rash, fever). It also includes a scrape or scratch near the surgery site.  If you have questions on the day of surgery, call your hospital or surgery center.  Eating and drinking guidelines  For your safety: Unless your surgeon tells you otherwise,  follow the guidelines below.  Eat and drink as usual until 8 hours before you arrive for surgery. After that, no food or milk.  Drink clear liquids until 2 hours before you arrive. These are liquids you can see through, like water, Gatorade, and Propel Water. They also include plain black coffee and tea (no cream or milk), candy, and breath mints. You can spit out gum when you arrive.  If you drink alcohol: Stop drinking it the night before surgery.  If your care team tells you to take medicine on the morning of surgery, it's okay to take it with a sip of water.  Preventing infection  Shower or bathe the night before and morning of your surgery. Follow the instructions your clinic gave you. (If no instructions, use regular soap.)  Don't shave or clip hair near your surgery site. We'll remove the hair if needed.  Don't smoke or vape the morning of surgery. You may chew nicotine gum up to 2 hours before surgery. A nicotine patch is okay.  Note: Some surgeries require you to completely quit smoking and nicotine. Check with your surgeon.  Your care team will make every effort to keep you safe from infection. We will:  Clean our hands often with soap and water (or an alcohol-based hand rub).  Clean the skin at your surgery site with a special soap that kills germs.  Give you a special gown to keep you warm. (Cold raises the risk of infection.)  Wear special hair covers, masks, gowns and gloves during surgery.  Give antibiotic medicine, if prescribed. Not all surgeries need antibiotics.  What to bring on the day of surgery  Photo ID and insurance card  Copy of your health care directive, if you have one  Glasses and hearing aids (bring cases)  You can't wear contacts during surgery  Inhaler and eye drops, if you use them (tell us about these when you arrive)  CPAP machine or breathing device, if you use them  A few personal items, if spending the night  If you have . . .  A pacemaker, ICD (cardiac defibrillator) or other  implant: Bring the ID card.  An implanted stimulator: Bring the remote control.  A legal guardian: Bring a copy of the certified (court-stamped) guardianship papers.  Please remove any jewelry, including body piercings. Leave jewelry and other valuables at home.  If you're going home the day of surgery  You must have a responsible adult drive you home. They should stay with you overnight as well.  If you don't have someone to stay with you, and you aren't safe to go home alone, we may keep you overnight. Insurance often won't pay for this.  After surgery  If it's hard to control your pain or you need more pain medicine, please call your surgeon's office.  Questions?   If you have any questions for your care team, list them here: _________________________________________________________________________________________________________________________________________________________________________ ____________________________________ ____________________________________ ____________________________________  For informational purposes only. Not to replace the advice of your health care provider. Copyright   2003, 2019 [employer] [facility]. All rights reserved. Clinically reviewed by Nikia Lee MD. [employer] 098131 - REV 12/22.    How to Take Your Medication Before Surgery  - Take all of your medications before surgery as usual

## 2023-10-10 NOTE — PROGRESS NOTES
[address]  [address]  [place] [address]  Phone: [phone]  Fax: [phone]  Primary Provider: Juno Draper  Pre-op Performing Provider: JUNO DRAPER      PREOPERATIVE EVALUATION:  Today's date: 10/10/2023    Kerri is a 63 year old female who presents for a preoperative evaluation.      10/10/2023     1:12 PM   Additional Questions   Roomed by Rhea   Accompanied by Self         10/10/2023     1:12 PM   Patient Reported Additional Medications   Patient reports taking the following new medications None       Surgical Information:  Surgery/Procedure: resection of synovial cyst   Surgery Location: Left Sacrum S1 nerve root   Surgeon: Reed Braxton MD  Surgery Date: 10/17/2023  Time of Surgery: 200pm  Where patient plans to recover: At home with family  Fax number for surgical facility: Note does not need to be faxed, will be available electronically in Epic.          10/10/2023     2:12 PM   Pre-op Questionnaire   1. Have you ever had a heart attack or stroke? No   2. Have you ever had surgery on your heart or blood vessels, such as a stent placement, a coronary artery bypass, or surgery on an artery in your head, neck, heart, or legs? No   3. Do you have chest pain with activity? No   4. Do you have a history of  heart failure? No   5. Do you currently have a cold, bronchitis or symptoms of other infection? No   6. Do you have a cough, shortness of breath, or wheezing? No   7. Do you or anyone in your family have previous history of blood clots? No   8. Do you or does anyone in your family have a serious bleeding problem such as prolonged bleeding following surgeries or cuts? No   9. Have you ever had problems with anemia or been told to take iron pills? No   10. Have you had any abnormal blood loss such as black, tarry or bloody stools, or abnormal vaginal bleeding? No   11. Have you ever had a blood transfusion? No   12. Are you willing to have a  blood transfusion if it is medically needed before, during, or after your surgery? Yes   13. Have you or any of your relatives ever had problems with anesthesia? No   14. Do you have sleep apnea, excessive snoring or daytime drowsiness? No   15. Do you have any artifical heart valves or other implanted medical devices like a pacemaker, defibrillator, or continuous glucose monitor? No   16. Do you have artificial joints? No   17. Are you allergic to latex? No        Assessment & Plan     The proposed surgical procedure is considered INTERMEDIATE risk.    (Z01.818) Preop general physical exam  (primary encounter diagnosis)  Comment:   Plan: CBC with platelets, Comprehensive metabolic         panel (BMP + Alb, Alk Phos, ALT, AST, Total.         Bili, TP)            (M71.30) Synovial cyst  Comment:   Plan:     Hypertension  At goal  Plan: The current medical regimen is effective;  continue present plan and medications.         - No identified additional risk factors other than previously addressed    Antiplatelet or Anticoagulation Medication Instructions:   - Patient is on no antiplatelet or anticoagulation medications.    Additional Medication Instructions:   - ACE/ARB: HOLD on day of surgery (minimum 11 hours for general anesthesia).    RECOMMENDATION:  APPROVAL GIVEN to proceed with proposed procedure, without further diagnostic evaluation.      I spent a total of 30 minutes on the day of the visit.   Time spent by me doing chart review, history and exam, documentation and further activities per the note      Subjective       HPI related to upcoming procedure: Left sciatica related to sacral synovial cyst      Health Care Directive:  Patient does not have a Health Care Directive or Living Will:     Preoperative Review of :   reviewed - not currently taking pain medications she was previously prescribed      Status of Chronic Conditions:  HYPERTENSION - Patient has longstanding history of HTN , currently denies  any symptoms referable to elevated blood pressure. Specifically denies chest pain, palpitations, dyspnea, orthopnea, PND or peripheral edema. Blood pressure readings have been in normal range. Current medication regimen is as listed below. Patient denies any side effects of medication.     Review of Systems  CONSTITUTIONAL: NEGATIVE for fever, chills, change in weight  INTEGUMENTARY/SKIN: NEGATIVE for worrisome rashes, moles or lesions  EYES: NEGATIVE for vision changes or irritation  ENT/MOUTH: NEGATIVE for ear, mouth and throat problems  RESP: NEGATIVE for significant cough or SOB  CV: NEGATIVE for chest pain, palpitations or peripheral edema  GI: NEGATIVE for nausea, abdominal pain, heartburn, or change in bowel habits  : NEGATIVE for frequency, dysuria, or hematuria  MUSCULOSKELETAL:see HPI  NEURO: left foot paresthesias  ENDOCRINE: NEGATIVE for temperature intolerance, skin/hair changes  HEME: NEGATIVE for bleeding problems  PSYCHIATRIC: NEGATIVE for changes in mood or affect    Patient Active Problem List    Diagnosis Date Noted    Major depressive disorder, recurrent episode, moderate (H) 08/22/2017     Priority: Medium    Hypertension goal BP (blood pressure) < 140/90 07/19/2017     Priority: Medium    Cataract, right eye 06/17/2017     Priority: Medium    Post concussive syndrome 01/04/2012     Priority: Medium    [place] (hard of hearing) 01/04/2012     Priority: Medium    Enthesopathy 10/12/2011     Priority: Medium     Problem list name updated by automated process. Provider to review      [facility] 09/07/2011     Priority: Medium     X-Contacted the Patient. Care Coordinator introduced self and the program. Patient declined to participate. Will not open to care coordination.             DX V65.8 REPLACED WITH [facility] (04/08/2013)      ASCUS  Pap smear; + high risk HPV DNA 01/20/2011     Priority: Medium     11/30/10: Pap - ASCUS, + HPV 58  1/20/11: Hana - ECC - WNL. Plan pap in 6  months.  6/13/11: Pap - NIL. Plan pap in 6 months.  1/4/12: Pap - NIL.  Plan pap in 1 yr.  5/15/14: NIL pap, neg HPV, endometrial cells noted with LMP of 5/13/14. Plan pap.   7/2015: NIL pap. Plan pap in 3 years.  6/17/19 NIL pap, Neg HPV.               CARDIOVASCULAR SCREENING; LDL GOAL LESS THAN 160 10/31/2010     Priority: Medium    Cold sore 11/12/2009     Priority: Medium    Paroxysmal supraventricular tachycardia 01/22/2009     Priority: Medium    Abnormal maternal glucose tolerance, antepartum 09/18/2006     Priority: Medium    Malaise and fatigue 09/18/2006     Priority: Medium     Problem list name updated by automated process. Provider to review      Disturbance in sleep behavior 09/18/2006     Priority: Medium     Problem list name updated by automated process. Provider to review        Past Medical History:   Diagnosis Date    ASCUS on Pap smear 11/30/2010    colp wnl    Cataract, right eye 06/17/2017    Depressive disorder     Diabetes (H)     h/o gestational    Hypertension     SVT (supraventricular tachycardia) 2008     Past Surgical History:   Procedure Laterality Date    APPENDECTOMY      CATARACT IOL, RT/LT Right 08/24/2018    CHOLECYSTECTOMY      COLONOSCOPY N/A 7/29/2021    Procedure: COLONOSCOPY, WITH POLYPECTOMY AND BIOPSY;  Surgeon: Po Constantino MD;  Location:  GI    COLONOSCOPY N/A 3/8/2023    Procedure: COLONOSCOPY, WITH BIOPSY;  Surgeon: Leventhal, Thomas Michael, MD;  Location: [facility] OR    PHACOEMULSIFICATION WITH STANDARD INTRAOCULAR LENS IMPLANT Right 8/22/2018    Procedure: PHACOEMULSIFICATION WITH STANDARD INTRAOCULAR LENS IMPLANT;  Right Eye Phacoemulsification with Standard Intraocular Lens;  Surgeon: Johny Daniels MD;  Location: [facility] OR     Current Outpatient Medications   Medication Sig Dispense Refill    cholestyramine (QUESTRAN) 4 g packet Take 1 packet (4 g) by mouth daily 60 packet 4    DULoxetine (CYMBALTA) 60 MG capsule Take 1 capsule (60 mg) by mouth daily 90  "capsule 3    losartan (COZAAR) 100 MG tablet Take 1 tablet (100 mg) by mouth daily 90 tablet 3    MULTI-VITAMIN OR TABS as directed 100 0    penciclovir (DENAVIR) 1 % external cream APPLY EVERY 2 HOURS AS DIRECTED 5 g PRN    valACYclovir (VALTREX) 1000 mg tablet Take 2 tablets (2,000 mg) by mouth 2 times daily as needed (cold sores) 20 tablet PRN    valACYclovir (VALTREX) 500 MG tablet TAKE 1 TABLET(500 MG) BY MOUTH DAILY 90 tablet 3    verapamil ER (CALAN-SR) 240 MG CR tablet TAKE 1 TABLET(240 MG) BY MOUTH DAILY 90 tablet 3       Allergies   Allergen Reactions    No Known Drug Allergy         Social History     Tobacco Use    Smoking status: Former     Packs/day: 0.00     Years: 0.00     Pack years: 0.00     Types: Cigarettes     Quit date: 2023     Years since quittin.1    Smokeless tobacco: Never    Tobacco comments:     3 per week    Substance Use Topics    Alcohol use: Yes     Comment: a couple drinks 3 times per week        History   Drug Use No         Objective     /74 (BP Location: Right arm, Patient Position: Sitting, Cuff Size: Adult Regular)   Pulse 72   Temp 98.3  F (36.8  C) (Temporal)   Resp 18   Ht 1.606 m (5' 3.23\")   Wt 58.5 kg (128 lb 14.4 oz)   LMP  (LMP Unknown)   SpO2 99%   BMI 22.67 kg/m      Physical Exam    GENERAL APPEARANCE: healthy, alert and no distress     EYES: EOMI, PERRL     HENT: ear canals and TM's normal and nose and mouth without ulcers or lesions     NECK: no adenopathy, no asymmetry, masses, or scars and thyroid normal to palpation     RESP: lungs clear to auscultation - no rales, rhonchi or wheezes     CV: regular rates and rhythm, normal S1 S2, no S3 or S4 and no murmur, click or rub     ABDOMEN:  soft, nontender, no HSM or masses and bowel sounds normal     MS: extremities normal- no gross deformities noted, no evidence of inflammation in joints, FROM in all extremities.     SKIN: no suspicious lesions or rashes     NEURO: Normal strength and tone, and " speech normal     PSYCH: mentation appears normal. and affect normal/bright     LYMPHATICS: No cervical adenopathy    Recent Labs   Lab Test 09/13/23  1049 02/23/23  0930 01/09/23  1552   HGB  --  13.6 12.6   PLT  --  303 331    134* 142   POTASSIUM 4.8 4.0 4.0   CR 0.56 0.50* 0.58        Diagnostics:  Recent Results (from the past 24 hour(s))   CBC with platelets    Collection Time: 10/10/23  2:26 PM   Result Value Ref Range    WBC Count 5.8 4.0 - 11.0 10e3/uL    RBC Count 3.87 3.80 - 5.20 10e6/uL    Hemoglobin 13.2 11.7 - 15.7 g/dL    Hematocrit 40.0 35.0 - 47.0 %     (H) 78 - 100 fL    MCH 34.1 (H) 26.5 - 33.0 pg    MCHC 33.0 31.5 - 36.5 g/dL    RDW 12.2 10.0 - 15.0 %    Platelet Count 288 150 - 450 10e3/uL      No EKG required, no history of coronary heart disease, significant arrhythmia, peripheral arterial disease or other structural heart disease.    Revised Cardiac Risk Index (RCRI):  The patient has the following serious cardiovascular risks for perioperative complications:   - No serious cardiac risks = 0 points     RCRI Interpretation: 0 points: Class I (very low risk - 0.4% complication rate)         Signed Electronically by: Mira Draper NP  Copy of this evaluation report is provided to requesting physician.    Answers submitted by the patient for this visit:  General Questionnaire (Submitted on 10/10/2023)  Chief Complaint: Chronic problems general questions HPI Form  What is the reason for your visit today? : pre op  How many servings of fruits and vegetables do you eat daily?: 2-3  On average, how many sweetened beverages do you drink each day (Examples: soda, juice, sweet tea, etc.  Do NOT count diet or artificially sweetened beverages)?: 1  How many minutes a day do you exercise enough to make your heart beat faster?: 10 to 19  How many days a week do you exercise enough to make your heart beat faster?: 4  How many days per week do you miss taking your medication?: 1

## 2023-10-11 ENCOUNTER — TELEPHONE (OUTPATIENT)
Dept: NEUROSURGERY | Facility: CLINIC | Age: 63
End: 2023-10-11
Payer: COMMERCIAL

## 2023-10-11 LAB
ALBUMIN SERPL BCG-MCNC: 4.4 G/DL (ref 3.5–5.2)
ALP SERPL-CCNC: 79 U/L (ref 35–104)
ALT SERPL W P-5'-P-CCNC: 17 U/L (ref 0–50)
ANION GAP SERPL CALCULATED.3IONS-SCNC: 10 MMOL/L (ref 7–15)
AST SERPL W P-5'-P-CCNC: 27 U/L (ref 0–45)
BILIRUB SERPL-MCNC: 0.3 MG/DL
BUN SERPL-MCNC: 16.7 MG/DL (ref 8–23)
CALCIUM SERPL-MCNC: 9.6 MG/DL (ref 8.8–10.2)
CHLORIDE SERPL-SCNC: 101 MMOL/L (ref 98–107)
CREAT SERPL-MCNC: 0.56 MG/DL (ref 0.51–0.95)
DEPRECATED HCO3 PLAS-SCNC: 27 MMOL/L (ref 22–29)
EGFRCR SERPLBLD CKD-EPI 2021: >90 ML/MIN/1.73M2
GLUCOSE SERPL-MCNC: 111 MG/DL (ref 70–99)
POTASSIUM SERPL-SCNC: 4.7 MMOL/L (ref 3.4–5.3)
PROT SERPL-MCNC: 7.3 G/DL (ref 6.4–8.3)
SODIUM SERPL-SCNC: 138 MMOL/L (ref 135–145)

## 2023-10-11 NOTE — TELEPHONE ENCOUNTER
Placed call to pt -    Message sent to [facility] to verify if forms are there. Verified we have not received.     Pt scheduled for surgery 10/17 for Left sacral laminectomy for resection of synovial cyst DR. Braxton.     Updated pt we have not received forms. Pt states her employer only needs the OV note faxed. Fax number: [phone] att. Sejal Gary (ph: [phone] if needed)    They will contact us if anything further is needed.     Routed to [facility] to fax OV note.

## 2023-10-11 NOTE — TELEPHONE ENCOUNTER
Patient has question about her [employer] forms and would like a call back. Please call patient at [phone]. Thank you~

## 2023-10-17 ENCOUNTER — HOSPITAL ENCOUNTER (OUTPATIENT)
Facility: CLINIC | Age: 63
Discharge: HOME | End: 2023-10-17
Attending: NEUROLOGICAL SURGERY | Admitting: NEUROLOGICAL SURGERY
Payer: COMMERCIAL

## 2023-10-17 ENCOUNTER — APPOINTMENT (OUTPATIENT)
Dept: GENERAL RADIOLOGY | Facility: CLINIC | Age: 63
End: 2023-10-17
Attending: NEUROLOGICAL SURGERY
Payer: COMMERCIAL

## 2023-10-17 ENCOUNTER — ANESTHESIA EVENT (OUTPATIENT)
Dept: SURGERY | Facility: CLINIC | Age: 63
End: 2023-10-17
Payer: COMMERCIAL

## 2023-10-17 ENCOUNTER — ANESTHESIA (OUTPATIENT)
Dept: SURGERY | Facility: CLINIC | Age: 63
End: 2023-10-17
Payer: COMMERCIAL

## 2023-10-17 VITALS
HEART RATE: 70 BPM | SYSTOLIC BLOOD PRESSURE: 114 MMHG | OXYGEN SATURATION: 94 % | RESPIRATION RATE: 16 BRPM | DIASTOLIC BLOOD PRESSURE: 76 MMHG | TEMPERATURE: 97.6 F | HEIGHT: 63 IN | BODY MASS INDEX: 23.04 KG/M2 | WEIGHT: 130 LBS

## 2023-10-17 DIAGNOSIS — Z98.890 S/P LAMINECTOMY: Primary | ICD-10-CM

## 2023-10-17 PROCEDURE — 250N000011 HC RX IP 250 OP 636: Performed by: ANESTHESIOLOGY

## 2023-10-17 PROCEDURE — 250N000011 HC RX IP 250 OP 636: Performed by: NURSE PRACTITIONER

## 2023-10-17 PROCEDURE — 250N000011 HC RX IP 250 OP 636: Mod: JZ | Performed by: NEUROLOGICAL SURGERY

## 2023-10-17 PROCEDURE — 250N000011 HC RX IP 250 OP 636: Performed by: NURSE ANESTHETIST, CERTIFIED REGISTERED

## 2023-10-17 PROCEDURE — 360N000084 HC SURGERY LEVEL 4 W/ FLUORO, PER MIN: Performed by: NEUROLOGICAL SURGERY

## 2023-10-17 PROCEDURE — 88304 TISSUE EXAM BY PATHOLOGIST: CPT | Mod: TC | Performed by: NEUROLOGICAL SURGERY

## 2023-10-17 PROCEDURE — 250N000009 HC RX 250: Performed by: NEUROLOGICAL SURGERY

## 2023-10-17 PROCEDURE — 258N000003 HC RX IP 258 OP 636: Performed by: ANESTHESIOLOGY

## 2023-10-17 PROCEDURE — 272N000282 HC OR IOM SUPPLIES OPNP: Performed by: NEUROLOGICAL SURGERY

## 2023-10-17 PROCEDURE — 999N000179 XR SURGERY CARM FLUORO LESS THAN 5 MIN W STILLS

## 2023-10-17 PROCEDURE — 710N000009 HC RECOVERY PHASE 1, LEVEL 1, PER MIN: Performed by: NEUROLOGICAL SURGERY

## 2023-10-17 PROCEDURE — 272N000001 HC OR GENERAL SUPPLY STERILE: Performed by: NEUROLOGICAL SURGERY

## 2023-10-17 PROCEDURE — 258N000003 HC RX IP 258 OP 636: Performed by: NURSE ANESTHETIST, CERTIFIED REGISTERED

## 2023-10-17 PROCEDURE — 922N000001 HC NEURO MONITORING SERVICE, UP TO 7 HOURS (T1FEE): Performed by: NEUROLOGICAL SURGERY

## 2023-10-17 PROCEDURE — 250N000013 HC RX MED GY IP 250 OP 250 PS 637: Performed by: ANESTHESIOLOGY

## 2023-10-17 PROCEDURE — 999N000141 HC STATISTIC PRE-PROCEDURE NURSING ASSESSMENT: Performed by: NEUROLOGICAL SURGERY

## 2023-10-17 PROCEDURE — 250N000009 HC RX 250: Performed by: NURSE ANESTHETIST, CERTIFIED REGISTERED

## 2023-10-17 PROCEDURE — 710N000012 HC RECOVERY PHASE 2, PER MINUTE: Performed by: NEUROLOGICAL SURGERY

## 2023-10-17 PROCEDURE — 250N000013 HC RX MED GY IP 250 OP 250 PS 637: Performed by: NURSE PRACTITIONER

## 2023-10-17 PROCEDURE — 370N000017 HC ANESTHESIA TECHNICAL FEE, PER MIN: Performed by: NEUROLOGICAL SURGERY

## 2023-10-17 PROCEDURE — 250N000025 HC SEVOFLURANE, PER MIN: Performed by: NEUROLOGICAL SURGERY

## 2023-10-17 PROCEDURE — 88304 TISSUE EXAM BY PATHOLOGIST: CPT | Mod: 26 | Performed by: PATHOLOGY

## 2023-10-17 RX ORDER — FENTANYL CITRATE 50 UG/ML
50 INJECTION, SOLUTION INTRAMUSCULAR; INTRAVENOUS EVERY 5 MIN PRN
Status: DISCONTINUED | OUTPATIENT
Start: 2023-10-17 | End: 2023-10-17 | Stop reason: HOSPADM

## 2023-10-17 RX ORDER — OXYCODONE HYDROCHLORIDE 5 MG/1
5 TABLET ORAL ONCE
Status: COMPLETED | OUTPATIENT
Start: 2023-10-17 | End: 2023-10-17

## 2023-10-17 RX ORDER — PROPOFOL 10 MG/ML
INJECTION, EMULSION INTRAVENOUS PRN
Status: DISCONTINUED | OUTPATIENT
Start: 2023-10-17 | End: 2023-10-17

## 2023-10-17 RX ORDER — ACETAMINOPHEN 325 MG/1
975 TABLET ORAL ONCE
Status: COMPLETED | OUTPATIENT
Start: 2023-10-17 | End: 2023-10-17

## 2023-10-17 RX ORDER — GLYCOPYRROLATE 0.2 MG/ML
INJECTION, SOLUTION INTRAMUSCULAR; INTRAVENOUS PRN
Status: DISCONTINUED | OUTPATIENT
Start: 2023-10-17 | End: 2023-10-17

## 2023-10-17 RX ORDER — ONDANSETRON 2 MG/ML
4 INJECTION INTRAMUSCULAR; INTRAVENOUS EVERY 30 MIN PRN
Status: DISCONTINUED | OUTPATIENT
Start: 2023-10-17 | End: 2023-10-17 | Stop reason: HOSPADM

## 2023-10-17 RX ORDER — SODIUM CHLORIDE, SODIUM LACTATE, POTASSIUM CHLORIDE, CALCIUM CHLORIDE 600; 310; 30; 20 MG/100ML; MG/100ML; MG/100ML; MG/100ML
INJECTION, SOLUTION INTRAVENOUS CONTINUOUS
Status: DISCONTINUED | OUTPATIENT
Start: 2023-10-17 | End: 2023-10-17 | Stop reason: HOSPADM

## 2023-10-17 RX ORDER — LIDOCAINE HYDROCHLORIDE 20 MG/ML
INJECTION, SOLUTION INFILTRATION; PERINEURAL PRN
Status: DISCONTINUED | OUTPATIENT
Start: 2023-10-17 | End: 2023-10-17

## 2023-10-17 RX ORDER — METHOCARBAMOL 500 MG/1
500 TABLET, FILM COATED ORAL 4 TIMES DAILY PRN
Qty: 40 TABLET | Refills: 0 | Status: SHIPPED | OUTPATIENT
Start: 2023-10-17 | End: 2023-10-23

## 2023-10-17 RX ORDER — GABAPENTIN 300 MG/1
300 CAPSULE ORAL
Status: COMPLETED | OUTPATIENT
Start: 2023-10-17 | End: 2023-10-17

## 2023-10-17 RX ORDER — ONDANSETRON 2 MG/ML
INJECTION INTRAMUSCULAR; INTRAVENOUS PRN
Status: DISCONTINUED | OUTPATIENT
Start: 2023-10-17 | End: 2023-10-17

## 2023-10-17 RX ORDER — HYDROMORPHONE HCL IN WATER/PF 6 MG/30 ML
0.2 PATIENT CONTROLLED ANALGESIA SYRINGE INTRAVENOUS EVERY 5 MIN PRN
Status: DISCONTINUED | OUTPATIENT
Start: 2023-10-17 | End: 2023-10-17 | Stop reason: HOSPADM

## 2023-10-17 RX ORDER — ONDANSETRON 4 MG/1
4 TABLET, ORALLY DISINTEGRATING ORAL EVERY 30 MIN PRN
Status: DISCONTINUED | OUTPATIENT
Start: 2023-10-17 | End: 2023-10-17 | Stop reason: HOSPADM

## 2023-10-17 RX ORDER — HYDROMORPHONE HCL IN WATER/PF 6 MG/30 ML
0.4 PATIENT CONTROLLED ANALGESIA SYRINGE INTRAVENOUS EVERY 5 MIN PRN
Status: DISCONTINUED | OUTPATIENT
Start: 2023-10-17 | End: 2023-10-17 | Stop reason: HOSPADM

## 2023-10-17 RX ORDER — FENTANYL CITRATE 50 UG/ML
INJECTION, SOLUTION INTRAMUSCULAR; INTRAVENOUS PRN
Status: DISCONTINUED | OUTPATIENT
Start: 2023-10-17 | End: 2023-10-17

## 2023-10-17 RX ORDER — FENTANYL CITRATE 50 UG/ML
25 INJECTION, SOLUTION INTRAMUSCULAR; INTRAVENOUS EVERY 5 MIN PRN
Status: DISCONTINUED | OUTPATIENT
Start: 2023-10-17 | End: 2023-10-17 | Stop reason: HOSPADM

## 2023-10-17 RX ORDER — CEFAZOLIN SODIUM/WATER 2 G/20 ML
2 SYRINGE (ML) INTRAVENOUS
Status: COMPLETED | OUTPATIENT
Start: 2023-10-17 | End: 2023-10-17

## 2023-10-17 RX ORDER — CEFAZOLIN SODIUM/WATER 2 G/20 ML
2 SYRINGE (ML) INTRAVENOUS SEE ADMIN INSTRUCTIONS
Status: DISCONTINUED | OUTPATIENT
Start: 2023-10-17 | End: 2023-10-17 | Stop reason: HOSPADM

## 2023-10-17 RX ORDER — BUPIVACAINE HYDROCHLORIDE AND EPINEPHRINE 5; 5 MG/ML; UG/ML
INJECTION, SOLUTION PERINEURAL PRN
Status: DISCONTINUED | OUTPATIENT
Start: 2023-10-17 | End: 2023-10-17 | Stop reason: HOSPADM

## 2023-10-17 RX ORDER — HYDRALAZINE HYDROCHLORIDE 20 MG/ML
2.5-5 INJECTION INTRAMUSCULAR; INTRAVENOUS EVERY 10 MIN PRN
Status: DISCONTINUED | OUTPATIENT
Start: 2023-10-17 | End: 2023-10-17 | Stop reason: HOSPADM

## 2023-10-17 RX ORDER — PROPOFOL 10 MG/ML
INJECTION, EMULSION INTRAVENOUS CONTINUOUS PRN
Status: DISCONTINUED | OUTPATIENT
Start: 2023-10-17 | End: 2023-10-17

## 2023-10-17 RX ORDER — AMOXICILLIN 250 MG
1 CAPSULE ORAL 2 TIMES DAILY PRN
Qty: 20 TABLET | Refills: 0 | Status: SHIPPED | OUTPATIENT
Start: 2023-10-17 | End: 2023-11-28

## 2023-10-17 RX ORDER — METHYLPREDNISOLONE ACETATE 40 MG/ML
INJECTION, SUSPENSION INTRA-ARTICULAR; INTRALESIONAL; INTRAMUSCULAR; SOFT TISSUE PRN
Status: DISCONTINUED | OUTPATIENT
Start: 2023-10-17 | End: 2023-10-17 | Stop reason: HOSPADM

## 2023-10-17 RX ORDER — OXYCODONE HYDROCHLORIDE 5 MG/1
5-10 TABLET ORAL EVERY 6 HOURS PRN
Qty: 40 TABLET | Refills: 0 | Status: SHIPPED | OUTPATIENT
Start: 2023-10-17 | End: 2023-10-23

## 2023-10-17 RX ORDER — LABETALOL HYDROCHLORIDE 5 MG/ML
10 INJECTION, SOLUTION INTRAVENOUS
Status: DISCONTINUED | OUTPATIENT
Start: 2023-10-17 | End: 2023-10-17 | Stop reason: HOSPADM

## 2023-10-17 RX ADMIN — ONDANSETRON 4 MG: 2 INJECTION INTRAMUSCULAR; INTRAVENOUS at 14:27

## 2023-10-17 RX ADMIN — SUCCINYLCHOLINE CHLORIDE 140 MG: 20 INJECTION, SOLUTION INTRAMUSCULAR; INTRAVENOUS; PARENTERAL at 14:05

## 2023-10-17 RX ADMIN — MIDAZOLAM 2 MG: 1 INJECTION INTRAMUSCULAR; INTRAVENOUS at 14:00

## 2023-10-17 RX ADMIN — FENTANYL CITRATE 50 MCG: 50 INJECTION, SOLUTION INTRAMUSCULAR; INTRAVENOUS at 16:03

## 2023-10-17 RX ADMIN — FENTANYL CITRATE 50 MCG: 50 INJECTION INTRAMUSCULAR; INTRAVENOUS at 14:13

## 2023-10-17 RX ADMIN — SODIUM CHLORIDE, POTASSIUM CHLORIDE, SODIUM LACTATE AND CALCIUM CHLORIDE: 600; 310; 30; 20 INJECTION, SOLUTION INTRAVENOUS at 15:41

## 2023-10-17 RX ADMIN — FENTANYL CITRATE 50 MCG: 50 INJECTION INTRAMUSCULAR; INTRAVENOUS at 14:03

## 2023-10-17 RX ADMIN — OXYCODONE HYDROCHLORIDE 5 MG: 5 TABLET ORAL at 16:20

## 2023-10-17 RX ADMIN — ACETAMINOPHEN 975 MG: 325 TABLET, FILM COATED ORAL at 16:20

## 2023-10-17 RX ADMIN — PROPOFOL 50 MG: 10 INJECTION, EMULSION INTRAVENOUS at 14:16

## 2023-10-17 RX ADMIN — GABAPENTIN 300 MG: 300 CAPSULE ORAL at 12:20

## 2023-10-17 RX ADMIN — PHENYLEPHRINE HYDROCHLORIDE 0.5 MCG/KG/MIN: 10 INJECTION INTRAVENOUS at 14:22

## 2023-10-17 RX ADMIN — Medication 2 G: at 14:00

## 2023-10-17 RX ADMIN — PHENYLEPHRINE HYDROCHLORIDE 100 MCG: 10 INJECTION INTRAVENOUS at 14:21

## 2023-10-17 RX ADMIN — SODIUM CHLORIDE, POTASSIUM CHLORIDE, SODIUM LACTATE AND CALCIUM CHLORIDE: 600; 310; 30; 20 INJECTION, SOLUTION INTRAVENOUS at 12:20

## 2023-10-17 RX ADMIN — FENTANYL CITRATE 50 MCG: 50 INJECTION, SOLUTION INTRAMUSCULAR; INTRAVENOUS at 16:25

## 2023-10-17 RX ADMIN — GLYCOPYRROLATE 0.2 MG: 0.2 INJECTION, SOLUTION INTRAMUSCULAR; INTRAVENOUS at 14:36

## 2023-10-17 RX ADMIN — PROPOFOL 50 MG: 10 INJECTION, EMULSION INTRAVENOUS at 14:14

## 2023-10-17 RX ADMIN — PROPOFOL 50 MG: 10 INJECTION, EMULSION INTRAVENOUS at 15:40

## 2023-10-17 RX ADMIN — LIDOCAINE HYDROCHLORIDE 80 MG: 20 INJECTION, SOLUTION INFILTRATION; PERINEURAL at 14:05

## 2023-10-17 RX ADMIN — PROPOFOL 100 MCG/KG/MIN: 10 INJECTION, EMULSION INTRAVENOUS at 14:06

## 2023-10-17 RX ADMIN — PROPOFOL 180 MG: 10 INJECTION, EMULSION INTRAVENOUS at 14:05

## 2023-10-17 ASSESSMENT — ACTIVITIES OF DAILY LIVING (ADL)
ADLS_ACUITY_SCORE: 20

## 2023-10-17 ASSESSMENT — ENCOUNTER SYMPTOMS: DYSRHYTHMIAS: 1

## 2023-10-17 NOTE — ANESTHESIA PROCEDURE NOTES
Airway       Patient location during procedure: OR       Procedure Start/Stop Times: 10/17/2023 2:08 PM  Staff -        Performed By: CRNA  Consent for Airway        Urgency: elective  Indications and Patient Condition       Indications for airway management: levy-procedural       Induction type:intravenous       Mask difficulty assessment: 1 - vent by mask    Final Airway Details       Final airway type: endotracheal airway       Successful airway: ETT - single and Oral  Endotracheal Airway Details        ETT size (mm): 7.0       Cuffed: yes       Successful intubation technique: video laryngoscopy       VL Blade Size: Glidescope 3       Grade View of Cords: 1       Adjucts: stylet       Position: Right       Measured from: lips       Secured at (cm): 21       Bite block used: Soft    Post intubation assessment        Placement verified by: capnometry, equal breath sounds and chest rise        Number of attempts at approach: 1       Secured with: silk tape       Ease of procedure: easy       Dentition: Intact and Unchanged    Medication(s) Administered   Medication Administration Time: 10/17/2023 2:08 PM

## 2023-10-17 NOTE — ANESTHESIA PREPROCEDURE EVALUATION
Anesthesia Pre-Procedure Evaluation    Patient: Kerri Timmons   MRN: [id number] : 1960        Procedure : Procedure(s):  Left sacral laminectomy for resection of synovial cyst          Past Medical History:   Diagnosis Date    ASCUS on Pap smear 2010    colp wnl    Cataract, right eye 2017    Depressive disorder     Enthesopathy     [place] (hard of hearing)     Hx gestational diabetes     Hypertension     Post concussive syndrome     SVT (supraventricular tachycardia)       Past Surgical History:   Procedure Laterality Date    APPENDECTOMY      CATARACT IOL, RT/LT Right 2018    CHOLECYSTECTOMY      COLONOSCOPY N/A 2021    Procedure: COLONOSCOPY, WITH POLYPECTOMY AND BIOPSY;  Surgeon: Po Constantino MD;  Location:  GI    COLONOSCOPY N/A 3/8/2023    Procedure: COLONOSCOPY, WITH BIOPSY;  Surgeon: Leventhal, Thomas Michael, MD;  Location: [facility] OR    PHACOEMULSIFICATION WITH STANDARD INTRAOCULAR LENS IMPLANT Right 2018    Procedure: PHACOEMULSIFICATION WITH STANDARD INTRAOCULAR LENS IMPLANT;  Right Eye Phacoemulsification with Standard Intraocular Lens;  Surgeon: Johny Daniels MD;  Location: [facility] OR      Allergies   Allergen Reactions    No Known Drug Allergy       Social History     Tobacco Use    Smoking status: Former     Packs/day: 0.00     Years: 0.00     Additional pack years: 0.00     Total pack years: 0.00     Types: Cigarettes     Quit date: 2023     Years since quittin.1    Smokeless tobacco: Never    Tobacco comments:     3 per week    Substance Use Topics    Alcohol use: Yes     Comment: a couple drinks 3 times per week       Wt Readings from Last 1 Encounters:   10/17/23 59 kg (130 lb)        Anesthesia Evaluation   Pt has had prior anesthetic.     No history of anesthetic complications       ROS/MED HX  ENT/Pulmonary:  - neg pulmonary ROS  (-) sleep apnea   Neurologic:       Cardiovascular: Comment: H/O PSVT    (+)  hypertension- -   -  - -                         dysrhythmias,              METS/Exercise Tolerance:     Hematologic:       Musculoskeletal:       GI/Hepatic:  - neg GI/hepatic ROS  (-) GERD   Renal/Genitourinary:  - neg Renal ROS     Endo:     (+)  type II DM,   Not using insulin,                 Psychiatric/Substance Use:     (+) psychiatric history anxiety and depression       Infectious Disease:       Malignancy:       Other:            Physical Exam    Airway        Mallampati: II   TM distance: > 3 FB   Neck ROM: full   Mouth opening: > 3 cm    Respiratory Devices and Support         Dental       (+) Multiple crowns, permanant bridges      Cardiovascular   cardiovascular exam normal          Pulmonary   pulmonary exam normal                OUTSIDE LABS:  CBC:   Lab Results   Component Value Date    WBC 5.8 10/10/2023    WBC 10.3 02/23/2023    HGB 13.2 10/10/2023    HGB 13.6 02/23/2023    HCT 40.0 10/10/2023    HCT 40.1 02/23/2023     10/10/2023     02/23/2023     BMP:   Lab Results   Component Value Date     10/10/2023     09/13/2023    POTASSIUM 4.7 10/10/2023    POTASSIUM 4.8 09/13/2023    CHLORIDE 101 10/10/2023    CHLORIDE 103 09/13/2023    CO2 27 10/10/2023    CO2 24 09/13/2023    BUN 16.7 10/10/2023    BUN 12.0 09/13/2023    CR 0.56 10/10/2023    CR 0.56 09/13/2023     (H) 10/10/2023     (H) 09/13/2023     COAGS:   Lab Results   Component Value Date    INR 0.95 01/18/2021     POC:   Lab Results   Component Value Date    HCG negative 01/15/2012     HEPATIC:   Lab Results   Component Value Date    ALBUMIN 4.4 10/10/2023    PROTTOTAL 7.3 10/10/2023    ALT 17 10/10/2023    AST 27 10/10/2023    ALKPHOS 79 10/10/2023    BILITOTAL 0.3 10/10/2023     OTHER:   Lab Results   Component Value Date    LACT 0.5 (L) 02/23/2023    A1C 5.6 08/22/2017    FADY 9.6 10/10/2023    LIPASE 152 08/16/2016    AMYLASE 54 11/12/2012    TSH 4.52 (H) 09/13/2023    T4 1.07 09/13/2023    CRP 0.09 08/19/2003    SED 30 11/25/2022        Anesthesia Plan    ASA Status:  2    NPO Status:  NPO Appropriate    Anesthesia Type: General.     - Airway: ETT   Induction: Intravenous, Propofol.   Maintenance: TIVA.   Techniques and Equipment:     - Airway: Video-Laryngoscope     - Lines/Monitors: 2nd IV     Consents    Anesthesia Plan(s) and associated risks, benefits, and realistic alternatives discussed. Questions answered and patient/representative(s) expressed understanding.     - Discussed:     - Discussed with:  Patient            Postoperative Care    Pain management: IV analgesics.   PONV prophylaxis: Ondansetron (or other 5HT-3), Background Propofol Infusion     Comments:    Other Comments: Propofol, remifentanil gtt  Neosynephrine gtt            Marion Doty MD

## 2023-10-17 NOTE — ANESTHESIA CARE TRANSFER NOTE
Patient: Kerri Timmons    Procedure: Procedure(s):  Left sacral laminectomy for resection of synovial cyst       Diagnosis: Synovial cyst of sacral region [M71.38]  Diagnosis Additional Information: No value filed.    Anesthesia Type:   General     Note:    Oropharynx: oropharynx clear of all foreign objects  Level of Consciousness: awake  Oxygen Supplementation: face mask    Independent Airway: airway patency satisfactory and stable  Dentition: dentition unchanged  Vital Signs Stable: post-procedure vital signs reviewed and stable  Report to RN Given: handoff report given  Patient transferred to: PACU    Handoff Report: Identifed the Patient, Identified the Reponsible Provider, Reviewed the pertinent medical history, Discussed the surgical course, Reviewed Intra-OP anesthesia mangement and issues during anesthesia, Set expectations for post-procedure period and Allowed opportunity for questions and acknowledgement of understanding    Vitals:  Vitals Value Taken Time   BP     Temp     Pulse     Resp     SpO2 100 % 10/17/23 1554   Vitals shown include unfiled device data.    Electronically Signed By: KELVIN Laureano CRNA  October 17, 2023  3:55 PM

## 2023-10-17 NOTE — ANESTHESIA POSTPROCEDURE EVALUATION
Patient: Kerri Timmons    Procedure: Procedure(s):  Left sacral laminectomy for resection of synovial cyst       Anesthesia Type:  General    Note:     Postop Pain Control: Uneventful            Sign Out: Well controlled pain   PONV: No   Neuro/Psych: Uneventful            Sign Out: Acceptable/Baseline neuro status   Airway/Respiratory: Uneventful            Sign Out: Acceptable/Baseline resp. status   CV/Hemodynamics: Uneventful            Sign Out: Acceptable CV status; No obvious hypovolemia; No obvious fluid overload   Other NRE: NONE   DID A NON-ROUTINE EVENT OCCUR? No           Last vitals:  Vitals Value Taken Time   /68 10/17/23 1700   Temp 36.4  C (97.6  F) 10/17/23 1645   Pulse 69 10/17/23 1709   Resp 12 10/17/23 1709   SpO2 93 % 10/17/23 1709   Vitals shown include unfiled device data.    Electronically Signed By: Marion Doty MD  October 17, 2023  5:12 PM

## 2023-10-17 NOTE — DISCHARGE INSTRUCTIONS
Today you were given 975 mg of Tylenol at 4:20PM. The recommended daily maximum dose is 4000 mg.    Same Day Surgery Discharge Instructions for  Sedation and General Anesthesia     It's not unusual to feel dizzy, light-headed or faint for up to 24 hours after surgery or while taking pain medication.  If you have these symptoms: sit for a few minutes before standing and have someone assist you when you get up to walk or use the bathroom.    You should rest and relax for the next 24 hours. We recommend you make arrangements to have an adult stay with you for at least 24 hours after your discharge.  Avoid hazardous and strenuous activity.    DO NOT DRIVE any vehicle or operate mechanical equipment for 24 hours following the end of your surgery.  Even though you may feel normal, your reactions may be affected by the medication you have received.    Do not drink alcoholic beverages for 24 hours following surgery.     Slowly progress to your regular diet as you feel able. It's not unusual to feel nauseated and/or vomit after receiving anesthesia.  If you develop these symptoms, drink clear liquids (apple juice, ginger ale, broth, 7-up, etc. ) until you feel better.  If your nausea and vomiting persists for 24 hours, please notify your surgeon.      All narcotic pain medications, along with inactivity and anesthesia, can cause constipation. Drinking plenty of liquids and increasing fiber intake will help.    For any questions of a medical nature, call your surgeon.    Do not make important decisions for 24 hours.    If you had general anesthesia, you may have a sore throat for a couple of days related to the breathing tube used during surgery.  You may use Cepacol lozenges to help with this discomfort.  If it worsens or if you develop a fever, contact your surgeon.     If you feel your pain is not well managed with the pain medications prescribed by your surgeon, please contact your surgeon's office to let them know so  they can address your concerns.     Reasons to contact your surgeon:    Signs of possible infection: Check your incision daily for redness, swelling, warmth, red streaks or foul drainage.   Elevated temperature.  Pain not controlled with pain medication and/or rest.   Uncontrolled nausea or vomiting.  Any questions or concerns.      **If you have questions or concerns about your procedure,   call Dr. Braxton at [phone]**

## 2023-10-17 NOTE — OP NOTE
Date of surgery: October 17, 2023  Surgeon: Reed Braxton MD  Assistant: Jennifer Ramirez NP  (Note: The assistant was present for and assisted with the entire surgery, and his/her role as an assistant was crucial for aid in positioning, exposure, suctioning, retraction, and closure)    Preoperative diagnosis: Lumbo-sacral synovial cyst  Postoperative diagnosis: Lumbosacral synovial cyst    Procedure:  1.  Left L5 and Sacral laminotomies for resection of synovial cyst  2.  Use of intraoperative microscope, neuro-monitoring, and fluoroscopy    EBL: 25 mL    Indications: 63 year old female who presented with severe left leg pain and sacral extra-axial lesion compressing the S1 nerve root, favored to be synovial cyst.  Underwent non-operative management with failure to improve.  Risks, benefits, indications, and alternatives were discussed with the patient and family in detail, and they wished to proceed.    Description of surgery: The patient was positioned prone.  Sterile prepping and draping procedures were performed.  Antibiotics were administered and timeout was performed.  A midline lumbosacral incision was performed.  The monopolar was used to expose the left L5 and sacral lamina.  The Concepcion retractor was inserted.  L5 and sacral laminotomies and medial facetectomy were performed with the high speed drill.  The microscope was brought into the field, and used for resection of the cyst.    The kerrison rongeur was used to remove the ligamentum flavum, and the cyst was exposed.  The cyst was dissected from the nerve root with the Elisa elevator, and resected with kerrison rongeurs.  Specimen was sent to pathology.  Hemostasis was achieved and antibiotic irrigation was performed.  The fascia was closed with 0-Vicryl sutures, and the dermal layer was closed with 2-0 vicryl sutures.  The skin was closed with a running subcuticular stitch.  There were no intraprocedural complications.

## 2023-10-17 NOTE — BRIEF OP NOTE
[facility]    Brief Operative Note    Pre-operative diagnosis: Synovial cyst of sacral region [M71.38]  Post-operative diagnosis Same as pre-operative diagnosis    Procedure: Left sacral laminectomy for resection of synovial cyst, Left - Spine    Surgeon: Surgeon(s) and Role:     * Reed Braxton MD - Primary  Anesthesia: General   Estimated Blood Loss: 25ml     Drains: None  Specimens:   ID Type Source Tests Collected by Time Destination   1 : Cyst Tissue Spine, Sacrum SURGICAL PATHOLOGY EXAM Reed Braxton MD 10/17/2023  3:27 PM      Findings: See op note

## 2023-10-18 ASSESSMENT — ACTIVITIES OF DAILY LIVING (ADL)
ADLS_ACUITY_SCORE: 20

## 2023-10-18 NOTE — PLAN OF CARE
PT:  Noted in chart that patient was admitted for Left sacral laminectomy for resection of synovial cyst. Pending PT orders were not received. Patient discharged without being seen by PT.

## 2023-10-20 ENCOUNTER — TELEPHONE (OUTPATIENT)
Dept: NEUROSURGERY | Facility: CLINIC | Age: 63
End: 2023-10-20
Payer: COMMERCIAL

## 2023-10-20 NOTE — TELEPHONE ENCOUNTER
Placed call to pt -    Attempted to reach out to patient, no answer. Unable to leave VM.     Sent myc.

## 2023-10-20 NOTE — TELEPHONE ENCOUNTER
Patient called and left message regarding post op instructions for wound care, specifically when to removed bandage.    Please call [phone]  Message left on 10-19-23 @ 2:10 p.

## 2023-10-23 DIAGNOSIS — Z98.890 S/P LAMINECTOMY: ICD-10-CM

## 2023-10-23 RX ORDER — METHOCARBAMOL 500 MG/1
500 TABLET, FILM COATED ORAL 4 TIMES DAILY PRN
Qty: 40 TABLET | Refills: 1 | Status: SHIPPED | OUTPATIENT
Start: 2023-10-23 | End: 2023-11-21

## 2023-10-23 RX ORDER — OXYCODONE HYDROCHLORIDE 5 MG/1
5-10 TABLET ORAL EVERY 4 HOURS PRN
Qty: 40 TABLET | Refills: 0 | Status: SHIPPED | OUTPATIENT
Start: 2023-10-23 | End: 2023-11-02

## 2023-10-23 NOTE — TELEPHONE ENCOUNTER
Patient calling for a refill of oxycodone and methocarbamol.     DOS: 10/17/23  Procedure: Left sacral laminectomy for resection of synovial cyst   Surgeon: Dr. Fox Albert Post Op: 6 days    Current symptom(s): 7/10 without medication. 2/10 after medication. Pain at incision site. Denies new n/t/w. Variable numbness in toes continues. Patient states incision is closed with staples.   Current pain management:   Oxycodone - 2 pills every 4 hours as pain returns at 4 hours. Reviewed with patient that rx is written for 6 hours. 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 Will ask care team about updating rx from Q6 to Q4 hours. Patient will wean to 1 pill at a time over the next week.  Tylenol - 4,000 mg / day. Will decrease to 3,000 mg.  Methocarbamol - 1 QID.    Last fill: 10/17/23 #40  Next visit: 11/2/23    Medication pended for your approval, if appropriate. Pharmacy verified.     Any patient questions or concerns:     Informed patient request will be forwarded to care team.

## 2023-10-23 NOTE — TELEPHONE ENCOUNTER
[facility] Call Center    Phone Message    May a detailed message be left on voicemail: yes     Reason for Call: Other: Patient is calling for oxyCODONE (ROXICODONE) 5 MG refill. Please send to  [facility]- [place], [place] - [address]       Action Taken: Other: [id number]    Travel Screening: Not Applicable

## 2023-10-24 ENCOUNTER — MYC MEDICAL ADVICE (OUTPATIENT)
Dept: NEUROSURGERY | Facility: CLINIC | Age: 63
End: 2023-10-24
Payer: COMMERCIAL

## 2023-10-24 NOTE — TELEPHONE ENCOUNTER
Dr. Braxton reviewed surgical pathology and states pathology confirms it was just synovial cyst. Patient updated via [place].

## 2023-10-31 ENCOUNTER — TELEPHONE (OUTPATIENT)
Dept: NEUROSURGERY | Facility: CLINIC | Age: 63
End: 2023-10-31
Payer: COMMERCIAL

## 2023-10-31 NOTE — TELEPHONE ENCOUNTER
Offered to switch 2 week post surgical nurse visit to a telephone visit.  Left clinic number for patient to call back and confirm appointment.

## 2023-11-02 ENCOUNTER — ALLIED HEALTH/NURSE VISIT (OUTPATIENT)
Dept: NEUROSURGERY | Facility: CLINIC | Age: 63
End: 2023-11-02
Payer: COMMERCIAL

## 2023-11-02 DIAGNOSIS — Z98.890 S/P LAMINECTOMY: ICD-10-CM

## 2023-11-02 PROCEDURE — 99207 PR NO CHARGE NURSE ONLY: CPT

## 2023-11-02 RX ORDER — OXYCODONE HYDROCHLORIDE 5 MG/1
5-10 TABLET ORAL EVERY 4 HOURS PRN
Qty: 40 TABLET | Refills: 0 | Status: SHIPPED | OUTPATIENT
Start: 2023-11-02 | End: 2023-11-21

## 2023-11-02 NOTE — PROGRESS NOTES
"Post-op Nurse Visit:    Patient seen today per the order of  Reed Braxton MD .   DOS: 10/17/23  Procedure: Left sacral laminectomy for resection of synovial cyst     Pain/Neuro Assessment  3/10 left foot and toes. Sometimes gets sharp pain to LLE calf, states spasms.   Numbness/tingling: numbness to Left calf, foot, 3 toes. LLE nerve pain to calf and 3 today. Pt states mostly the same but might be \"a little better\".  Pt states when in shower her LLE foot couldn't tell if this was hot or cold water. Pt said when wearing shoes today they felt less \"tight\".  Reviewed DVT s/s and when to seek emergent care.   Strength: pt reports slight weakness to LLE, same as preop.     Pain Relief Measures:  Oxycodone: 1 tab/day  Tylenol: PRN, usually AM  Robaxin: NA > took 1 this am but usually does not use. Educated to try and use while weaning off oxycodone  Aleve: PRN  Ice: PRN > pt states unsure if helping     Incision   Incision inspected. Edges well-approximated. No redness, swelling, drainage, or warmth noted. Incision prepped with betadine and staple(s)  removed without difficulty. Steri-strips applied.    Activity  Following restrictions   Falls:  none  Patient is walking frequently without difficulty.   Denies redness, swelling, or warmth to bilateral calves > reviewed DVT s/s as pt has Left calf pain. Pt aware of red flags and when to see care.   Wearing brace? No    GI/  Difficulty swallowing? No  Patient's appetite is normal  Bowel/bladder problems? No  Taking stool softeners? No     Refills/x-rays/return to work  Refills given at this appointment? Yes, oxycodone ledy'd up and routed to [facility]ANE team.   Sent for x-rays after this appointment? No  Ordered future x-rays? No  Scheduling team notified? NA  Return to work discussed at this appointment? 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 Pt was given letter to excuse until 6 wks post op from our team. Pt updated that we don't support LTD but that we complete " STD. Pt given medical records number to obtain any OV notes she may need for the claim. Pt verbalized understanding.     All of patient's questions addressed today. Patient was instructed to call with any additional questions/concerns.

## 2023-11-02 NOTE — PATIENT INSTRUCTIONS
Instructions for Patient    Incision  Keep your incision clean and dry at all times.   It is okay to shower, just pat the incision dry   No submerging incision in water such as pools, hot tubs, or baths for at least 8 weeks and until the incision is healed  Do not apply lotions or ointments to incision    Activity  No lifting greater than 10 pounds. No bending, twisting, or overhead reaching.  Walking is the best way to start exercise after surgery. Take short frequent walks. You may gradually increase the distance as tolerated. If you feel pain, decrease your activity, but DO NOT stop walking. Walking will help you gain strength, prevent muscle soreness and spasms, and prevent blood clots.   Continue to wear brace as directed by your surgeon  Avoid bed rest and prolonged sitting for longer than 30 minutes (change positions frequently while awake)  No contact sports or high impact activities such as; running/jogging, snowmobile or 4 mathew riding or any other recreational vehicles until after given clearance at one of your follow up visits    Medications   Refills of pain medication:   Please call the neurosurgery clinic to request 2-3 days before you run out  Weaning from narcotic pain medications  When it is time, start weaning by extending the time between doses.   For example, if you're taking 2 tabs every 4 hours, spread it out to 2 tabs over 4.5, 5, 6 hours. At that point you can certainly cut down to 1 tab, then wean to an as needed basis until completely done with them.Refills: call our clinic 2-3 business days before you are out of medication. A nurse will call you back to obtain a pain assessment.   Don't take more than 3000mg of Acetaminophen in 24 hours  Ok to begin taking Aspirin and NSAIDs (ex: ibuprofen/Advil)  Encouraged icing for at least 3-4 times throughout the day for 20-30 minutes at a time. Avoid heat to the incision area.   Taking stool softeners regularly can reduce constipation commonly  caused by narcotic pain medications.    Contact clinic or Emergency Room if you develop:   Infection (redness, swelling, warmth, drainage, fever over 101 F)  New injury  Bladder or bowel changes or loss of control    Signs of blood clot:  Swelling and/or warmth in one or both legs  Pain or tenderness in your leg, ankle, foot, or arm   Red or discolored skin     Go to the Emergency Room   If sudden onset of severe headache, weakness, confusion, change in level of consciousness, pain, or loss of movement.  Chest pain  Trouble breathing     Post-operative appointments  6 week and 3 months post-op    [facility]  [facility]  [address]  [place] [address]  Telephone:  [phone]   Fax:  [phone]

## 2023-11-20 ENCOUNTER — MYC MEDICAL ADVICE (OUTPATIENT)
Dept: NEUROSURGERY | Facility: CLINIC | Age: 63
End: 2023-11-20
Payer: COMMERCIAL

## 2023-11-21 ENCOUNTER — ANCILLARY PROCEDURE (OUTPATIENT)
Dept: MAMMOGRAPHY | Facility: CLINIC | Age: 63
End: 2023-11-21
Attending: NURSE PRACTITIONER
Payer: COMMERCIAL

## 2023-11-21 DIAGNOSIS — Z12.31 VISIT FOR SCREENING MAMMOGRAM: ICD-10-CM

## 2023-11-21 PROCEDURE — 77063 BREAST TOMOSYNTHESIS BI: CPT | Mod: GC | Performed by: STUDENT IN AN ORGANIZED HEALTH CARE EDUCATION/TRAINING PROGRAM

## 2023-11-21 PROCEDURE — 77067 SCR MAMMO BI INCL CAD: CPT | Mod: GC | Performed by: STUDENT IN AN ORGANIZED HEALTH CARE EDUCATION/TRAINING PROGRAM

## 2023-11-28 ENCOUNTER — TELEPHONE (OUTPATIENT)
Dept: NEUROSURGERY | Facility: CLINIC | Age: 63
End: 2023-11-28

## 2023-11-28 ENCOUNTER — OFFICE VISIT (OUTPATIENT)
Dept: NEUROSURGERY | Facility: CLINIC | Age: 63
End: 2023-11-28
Payer: COMMERCIAL

## 2023-11-28 VITALS
DIASTOLIC BLOOD PRESSURE: 77 MMHG | HEIGHT: 63 IN | BODY MASS INDEX: 23.5 KG/M2 | HEART RATE: 82 BPM | WEIGHT: 132.6 LBS | SYSTOLIC BLOOD PRESSURE: 125 MMHG

## 2023-11-28 DIAGNOSIS — Z98.890 S/P LAMINECTOMY: Primary | ICD-10-CM

## 2023-11-28 DIAGNOSIS — M79.662 PAIN OF LEFT CALF: ICD-10-CM

## 2023-11-28 DIAGNOSIS — M71.30 SYNOVIAL CYST: ICD-10-CM

## 2023-11-28 PROCEDURE — 99024 POSTOP FOLLOW-UP VISIT: CPT | Performed by: NURSE PRACTITIONER

## 2023-11-28 ASSESSMENT — PAIN SCALES - GENERAL: PAINLEVEL: MODERATE PAIN (4)

## 2023-11-28 NOTE — TELEPHONE ENCOUNTER
----- Message from Kari De La Rosa RN sent at 11/28/2023 12:44 PM CST -----    ----- Message -----  From: Roxane Gonzalez NP  Sent: 11/28/2023  12:40 PM CST  To: [facility] Pool    Please fax updated work letter to her employer. Looks like fax information is documented in chart. Thanks!

## 2023-11-28 NOTE — PATIENT INSTRUCTIONS
- May increase lifting restriction to 20-25 pounds and gradually increase as tolerated.  - Left leg ultrasound to evaluate for blood clots.   - Physical therapy ordered and faxed to [facility] in [place]. Please contact them in 1-2 days to schedule.  - Work letter written and sent via [employer]. Will fax accordingly.   - Follow up in 6 weeks as scheduled.    - Call the clinic at [phone] for increased pain or any other questions and concerns.

## 2023-11-28 NOTE — TELEPHONE ENCOUNTER
Faxed Form November 28, 2023 to fax number [phone]    Right Fax confirmed at 247 PM    Johnson Silveira

## 2023-11-28 NOTE — PROGRESS NOTES
"[facility]  Neurosurgery Follow Up:    HPI: 6 weeks s/p left sacral laminectomy for resection of synovial cyst with Dr. Braxton on 10/17/2023. Continues to report incisional back pain with left calf pain and associated leg and foot paresthesias. No change since surgery. No overt weakness. Incision CDI. Recently completed MDP without change in symptoms. No new or worsening symptoms.     Medical, surgical, family, and social history unchanged since prior exam.  Exam:  Constitutional:  Alert, well nourished, NAD.  HEENT: Normocephalic, atraumatic.   Pulm:  Without shortness of breath   CV:  No pitting edema of BLE.      Vital Signs:  /77   Pulse 82   Ht 5' 3\" (1.6 m)   Wt 132 lb 9.6 oz (60.1 kg)   LMP  (LMP Unknown)   BMI 23.49 kg/m      Neurological:  Awake  Alert  Oriented x 3  Motor exam:        IP Q DF PF EHL  R   5  5   5   5    5  L   5  5   5   5    5     Able to spontaneously move L/E bilaterally  Sensation intact throughout all L/E dermatomes     Incisions:  Healing nicely  Imaging: No new imaging to review.    A/P: s/p laminectomy for resection of synovial cyst  May gradually increase activities. May begin PT. Will keep off work until next follow up. LLE US for evaluation for DVT. I will contact her with the results. She will follow up in 6 weeks as scheduled. She verbalized understanding and agreement.  Patient Instructions   - May increase lifting restriction to 20-25 pounds and gradually increase as tolerated.  - Left leg ultrasound to evaluate for blood clots.   - Physical therapy ordered and [facility]ed to [facility] in [place]. Please contact them in 1-2 days to schedule.  - Work letter written and sent via [employer]. Will fax accordingly.   - Follow up in 6 weeks as scheduled.    - Call the clinic at [phone] for increased pain or any other questions and concerns..    Roxane Gonzalez, SHEEBA  [facility]  [address] [place] [address]  [address] [place] [id number]  Tel " [phone]  Fax [phone]

## 2023-11-28 NOTE — LETTER
November 28, 2023      Kerri Timmons  [address] [place] [address]        To Whom It May Concern:    Kerri DE LA GARZA Timmons  was seen on 11/28/2023.  Please excuse her until 1/23/2024 due to ongoing postoperative recovery.        Sincerely,        Roxane Gonzalez, CNP

## 2023-11-28 NOTE — NURSING NOTE
"Kerri Timmons is a 63 year old female who presents for:  Chief Complaint   Patient presents with    Neurologic Problem     Left sacral laminectomy for resection of synovial cyst. DOS 10/17/23. Patient notes her low back is getting better but still has some issues. She is not strong in her back to go back to work. She continues to have the nerve pain in the left calf and last 3 toes of her foot. She finished the steroids.         Vitals:    Vitals:    11/28/23 1139   BP: 125/77   Pulse: 82   Weight: 132 lb 9.6 oz (60.1 kg)   Height: 5' 3\" (1.6 m)       BMI:  Estimated body mass index is 23.49 kg/m  as calculated from the following:    Height as of this encounter: 5' 3\" (1.6 m).    Weight as of this encounter: 132 lb 9.6 oz (60.1 kg).    Pain Score:  Moderate Pain (4)        Joann Reich CMA      "

## 2023-11-28 NOTE — LETTER
"    11/28/2023         RE: Kerri Timmons  [address]  [place] [address]        Dear Colleague,    Thank you for referring your patient, Kerri Timmons, to the [facility]. Please see a copy of my visit note below.    [facility]  Neurosurgery Follow Up:    HPI: 6 weeks s/p left sacral laminectomy for resection of synovial cyst with Dr. Braxton on 10/17/2023. Continues to report incisional back pain with left calf pain and associated leg and foot paresthesias. No change since surgery. No overt weakness. Incision CDI. Recently completed MDP without change in symptoms. No new or worsening symptoms.     Medical, surgical, family, and social history unchanged since prior exam.  Exam:  Constitutional:  Alert, well nourished, NAD.  HEENT: Normocephalic, atraumatic.   Pulm:  Without shortness of breath   CV:  No pitting edema of BLE.      Vital Signs:  /77   Pulse 82   Ht 5' 3\" (1.6 m)   Wt 132 lb 9.6 oz (60.1 kg)   LMP  (LMP Unknown)   BMI 23.49 kg/m      Neurological:  Awake  Alert  Oriented x 3  Motor exam:        IP Q DF PF EHL  R   5  5   5   5    5  L   5  5   5   5    5     Able to spontaneously move L/E bilaterally  Sensation intact throughout all L/E dermatomes     Incisions:  Healing nicely  Imaging: No new imaging to review.    A/P: s/p laminectomy for resection of synovial cyst  May gradually increase activities. May begin PT. Will keep off work until next follow up. LLE US for evaluation for DVT. I will contact her with the results. She will follow up in 6 weeks as scheduled. She verbalized understanding and agreement.  Patient Instructions   - May increase lifting restriction to 20-25 pounds and gradually increase as tolerated.  - Left leg ultrasound to evaluate for blood clots.   - Physical therapy ordered and faxed to [facility] in [place]. Please contact them in 1-2 days to schedule.  - Work letter written and sent via [employer]. Will fax " accordingly.   - Follow up in 6 weeks as scheduled.    - Call the clinic at [phone] for increased pain or any other questions and concerns..    Roxane Gonzalez CNP  [address] [address]  Tel [phone]  Fax [phone]    Again, thank you for allowing me to participate in the care of your patient.        Sincerely,        Roxane Gonzalez, NP

## 2023-11-30 ENCOUNTER — HOSPITAL ENCOUNTER (OUTPATIENT)
Dept: ULTRASOUND IMAGING | Facility: CLINIC | Age: 63
Discharge: HOME | End: 2023-11-30
Attending: NURSE PRACTITIONER | Admitting: NURSE PRACTITIONER
Payer: COMMERCIAL

## 2023-11-30 ENCOUNTER — TELEPHONE (OUTPATIENT)
Dept: NEUROSURGERY | Facility: CLINIC | Age: 63
End: 2023-11-30
Payer: COMMERCIAL

## 2023-11-30 DIAGNOSIS — M79.662 PAIN OF LEFT CALF: ICD-10-CM

## 2023-11-30 DIAGNOSIS — M71.30 SYNOVIAL CYST: ICD-10-CM

## 2023-11-30 DIAGNOSIS — Z98.890 S/P LAMINECTOMY: ICD-10-CM

## 2023-11-30 PROCEDURE — 93971 EXTREMITY STUDY: CPT | Mod: LT

## 2023-11-30 NOTE — TELEPHONE ENCOUNTER
Pt called, requesting for her After Visit Summary from 11.28.23 to be faxed to:    [employer]  FAX: [phone]  ATTN: Anna  Claim: [id number]    Please FAX through, if approved. Thank you.

## 2023-12-05 NOTE — TELEPHONE ENCOUNTER
Faxed Form December 5, 2023 to fax number     Right Fax confirmed at 1147 AM    Johnson Silveira

## 2023-12-27 ENCOUNTER — TELEPHONE (OUTPATIENT)
Dept: NEUROSURGERY | Facility: CLINIC | Age: 63
End: 2023-12-27
Payer: COMMERCIAL

## 2023-12-27 NOTE — TELEPHONE ENCOUNTER
[facility] Call Center    Phone Message    May a detailed message be left on voicemail: yes     Reason for Call:  Requesting clearance to do a BLS course prior to her appt on 01/23, so she can be  ready to go back to work when that time comes. Would like the note sent to her employer [employer] fax [phone], phone if needed [phone]  Also c/b to further discuss if they think she can even do the class

## 2023-12-27 NOTE — TELEPHONE ENCOUNTER
"Per Roxane Gonzalez NP OK to write requested letter.     Attempted to reach out to patient, no answer. Left voice message for them to call clinic back to further discuss.      Letter not yet written as below message stated \"Also c/b to further discuss if they think she can even do the class\".     Awaiting call back from patient.   "

## 2023-12-27 NOTE — TELEPHONE ENCOUNTER
Patient s/p left sacral laminectomy for resection of synovial cyst with Dr. Braxton on 10/17/2023.    Patient saw Roxane Gonzalez NP on 11/28/23 who wrote work letter excusing her until 1/23/24.    Patient now requesting letter stating OK to complete BLS course prior to appt with Jennifer Ramirez CNP on 1/23/24.     Will review with team.

## 2023-12-27 NOTE — LETTER
01/09/24     To Whom it May Concern,      Kerri A Iain is being seen at our clinic for post-operative follow up care. She is cleared to complete a BLS/CPR class.      Please call our clinic with questions or concerns: [phone]      Sincerely,    Roxane Gonzalez NP

## 2024-01-09 ENCOUNTER — TELEPHONE (OUTPATIENT)
Dept: NEUROSURGERY | Facility: CLINIC | Age: 64
End: 2024-01-09
Payer: COMMERCIAL

## 2024-01-09 NOTE — TELEPHONE ENCOUNTER
Patient is clear to take the class but will need a letter sent to her [employer] manager to clear patient that she can do CPR/BLS. FAX# [phone]  Attn: Hannah Montero P: [phone]

## 2024-01-09 NOTE — TELEPHONE ENCOUNTER
Letter written and faxed as requested.     Faxed  Letter  January 9, 2024 to fax number [phone]    Right Fax confirmed at 2:32 PM    Called patient to provide update, no answer. Left detailed VM.

## 2024-01-22 ENCOUNTER — TELEPHONE (OUTPATIENT)
Dept: NEUROSURGERY | Facility: CLINIC | Age: 64
End: 2024-01-22
Payer: COMMERCIAL

## 2024-01-22 NOTE — TELEPHONE ENCOUNTER
An appointment reminder message was left for patient's neurosurgery visit on 1-23-24 in our [place] clinic. This is patient's 12 week post surgical visit.

## 2024-01-23 ENCOUNTER — OFFICE VISIT (OUTPATIENT)
Dept: NEUROSURGERY | Facility: CLINIC | Age: 64
End: 2024-01-23
Payer: COMMERCIAL

## 2024-01-23 VITALS
HEIGHT: 63 IN | WEIGHT: 131.6 LBS | DIASTOLIC BLOOD PRESSURE: 86 MMHG | SYSTOLIC BLOOD PRESSURE: 122 MMHG | BODY MASS INDEX: 23.32 KG/M2 | HEART RATE: 90 BPM

## 2024-01-23 DIAGNOSIS — Z98.890 S/P LAMINECTOMY: Primary | ICD-10-CM

## 2024-01-23 PROCEDURE — 99213 OFFICE O/P EST LOW 20 MIN: CPT | Performed by: NURSE PRACTITIONER

## 2024-01-23 ASSESSMENT — PAIN SCALES - GENERAL: PAINLEVEL: MODERATE PAIN (5)

## 2024-01-23 NOTE — Clinical Note
Hey team,  I wrote an updated work letter for the patient, extending time off work for another month.  Can we please fax this letter to her employer and update her [employer] paperwork as well.  She said she does not need a copy and that we can just fax it to the numbers that we have in the past.  Please let me know if there are any questions.  Thank you!

## 2024-01-23 NOTE — PATIENT INSTRUCTIONS
Order for an updated lumbar spine MRI. I will call with the results and next steps.     Work letter extended for 1 month. We will fax the updated work letter and [employer] paperwork.     Please call our clinic with questions or concerns.

## 2024-01-23 NOTE — NURSING NOTE
"Kerri Timmons is a 63 year old female who presents for:  Chief Complaint   Patient presents with    Neurologic Problem     Left sacral laminectomy for resection of synovial cyst. DOS 10/17/23. Back is getting stronger but still not there. Still has nerve pain. Doing physical therapy.         Vitals:    Vitals:    01/23/24 1058   BP: 122/86   Pulse: 90   Weight: 131 lb 9.6 oz (59.7 kg)   Height: 5' 3\" (1.6 m)       BMI:  Estimated body mass index is 23.31 kg/m  as calculated from the following:    Height as of this encounter: 5' 3\" (1.6 m).    Weight as of this encounter: 131 lb 9.6 oz (59.7 kg).    Pain Score:  Moderate Pain (5)        Joann Reich CMA      "

## 2024-01-23 NOTE — LETTER
"    1/23/2024         RE: Kerri Timmons  [address]  [place] [address]        Dear Colleague,    Thank you for referring your patient, Kerri Timmons, [facility] Please see a copy of my visit note below.    [facility] Neurosurgery Follow-Up:     HPI: 3 months s/p left L5 and sacral laminotomies for resection of synovial cyst  with Dr. Braxton.  Patient reports low back pain has improved since working with physical therapy.  She follows with [facility] physical therapy in [place].  Her main concern today is constant pain in left buttocks with continued numbness in left calf, plantar foot, and last 3 toes.  Patient states the symptoms initially improved after surgery but recently returned over the last few weeks.  She also feels some weakness in her left leg and foot.  Muscle spasms in calf have improved compared to preop.  Denies any incision concerns. Denies any recent trauma, injuries, or falls.  Patient has been following postop activity restrictions as instructed.  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  Patient would like an updated lumbar spine MRI ordered today.    Current pain: 5/10    Exam:  Constitutional:  Alert, well nourished, NAD.  HEENT: Normocephalic, atraumatic.   Pulm:  Without shortness of breath   CV:  No pitting edema of BLE.      /86   Pulse 90   Ht 5' 3\" (1.6 m)   Wt 131 lb 9.6 oz (59.7 kg)   LMP  (LMP Unknown)   BMI 23.31 kg/m      Neurological:  Awake  Alert  Oriented x 3  Motor exam:  Iliopsoas  (hip flexion)               Right: 5/5  Left:  5/5  Quadriceps  (knee extension)       Right:  5/5  Left:  5/5  Hamstrings  (knee flexion)            Right:  5/5  Left:  5/5  Gastroc Soleus  (PF)                          Right:  5/5  Left:  4+/5  Tibialis Ant  (DF)                          Right:  5/5  Left:  5/5  EHL                          Right:  5/5  Left:  5/5       Numbness in left lateral " calf, plantar foot, and last 3 toes  Normal gait     Incision: Well healed     Assessment/Plan:   3 months s/p left L5 and sacral laminotomies for resection of synovial cyst  with Dr. Braxton.  Patient reports symptoms initially improved after surgery but then returned over the last few weeks.  She describes constant pain in left buttocks with numbness in left calf, plantar foot, and last 3 toes.  She has 4+/5 left PF weakness on exam, similar to preop.  We discussed next steps.    - Updated lumbar spine MRI ordered for further  evaluation. Will call patient with results and next steps.   - Work letter extended x 1 month. Will have care team fax updated work letter and [employer] paperwork.     Advised patient to call our clinic with any questions or concerns.  Patient voiced understanding and agreement.      Jennifer Ramirez CNP  [facility]  Tel [phone]  Pager [phone]      Again, thank you for allowing me to participate in the care of your patient.        Sincerely,        Jennifer Ramirez, SHANTI

## 2024-01-23 NOTE — PROGRESS NOTES
"[facility] Neurosurgery Follow-Up:     HPI: 3 months s/p left L5 and sacral laminotomies for resection of synovial cyst  with Dr. Braxton.  Patient reports low back pain has improved since working with physical therapy.  She follows with [facility] physical therapy in [place].  Her main concern today is constant pain in left buttocks with continued numbness in left calf, plantar foot, and last 3 toes.  Patient states the symptoms initially improved after surgery but recently returned over the last few weeks.  She also feels some weakness in her left leg and foot.  Muscle spasms in calf have improved compared to preop.  Denies any incision concerns. Denies any recent trauma, injuries, or falls.  Patient has been following postop activity restrictions as instructed.  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  Patient would like an updated lumbar spine MRI ordered today.    Current pain: 5/10    Exam:  Constitutional:  Alert, well nourished, NAD.  HEENT: Normocephalic, atraumatic.   Pulm:  Without shortness of breath   CV:  No pitting edema of BLE.      /86   Pulse 90   Ht 5' 3\" (1.6 m)   Wt 131 lb 9.6 oz (59.7 kg)   LMP  (LMP Unknown)   BMI 23.31 kg/m      Neurological:  Awake  Alert  Oriented x 3  Motor exam:  Iliopsoas  (hip flexion)               Right: 5/5  Left:  5/5  Quadriceps  (knee extension)       Right:  5/5  Left:  5/5  Hamstrings  (knee flexion)            Right:  5/5  Left:  5/5  Gastroc Soleus  (PF)                          Right:  5/5  Left:  4+/5  Tibialis Ant  (DF)                          Right:  5/5  Left:  5/5  EHL                          Right:  5/5  Left:  5/5       Numbness in left lateral calf, plantar foot, and last 3 toes  Normal gait     Incision: Well healed     Assessment/Plan:   3 months s/p left L5 and sacral laminotomies for resection of synovial cyst  with Dr. Braxton.  Patient reports symptoms initially improved after surgery but then " returned over the last few weeks.  She describes constant pain in left buttocks with numbness in left calf, plantar foot, and last 3 toes.  She has 4+/5 left PF weakness on exam, similar to preop.  We discussed next steps.    - Updated lumbar spine MRI ordered for further  evaluation. Will call patient with results and next steps.   - Work letter extended x 1 month. Will have care team fax updated work letter and [employer] paperwork.     Advised patient to call our clinic with any questions or concerns.  Patient voiced understanding and agreement.      Jennifer Ramirez [facility] Neurosurgery  Tel [phone]  Pager [phone]

## 2024-01-23 NOTE — LETTER
January 23, 2024      Kerri Timmons  [address] [place] [address]        To Whom It May Concern:    Kerri Timmons  was seen today for surgical follow-up.  Please continue to excuse her from work until 2/23/2024 due to postoperative recovery.        Sincerely,        Jennifer Ramirez, NP

## 2024-01-25 ENCOUNTER — MYC MEDICAL ADVICE (OUTPATIENT)
Dept: FAMILY MEDICINE | Facility: CLINIC | Age: 64
End: 2024-01-25
Payer: COMMERCIAL

## 2024-01-29 NOTE — TELEPHONE ENCOUNTER
Message sent via [employer].    Eri Ramos, RN, BSN, PHN  [facility]  [phone]

## 2024-02-05 ENCOUNTER — HOSPITAL ENCOUNTER (OUTPATIENT)
Dept: MRI IMAGING | Facility: CLINIC | Age: 64
Discharge: HOME | End: 2024-02-05
Attending: NURSE PRACTITIONER | Admitting: NURSE PRACTITIONER
Payer: COMMERCIAL

## 2024-02-05 DIAGNOSIS — Z98.890 S/P LAMINECTOMY: ICD-10-CM

## 2024-02-05 PROCEDURE — A9585 GADOBUTROL INJECTION: HCPCS | Performed by: NURSE PRACTITIONER

## 2024-02-05 PROCEDURE — 72158 MRI LUMBAR SPINE W/O & W/DYE: CPT

## 2024-02-05 PROCEDURE — 255N000002 HC RX 255 OP 636: Performed by: NURSE PRACTITIONER

## 2024-02-05 RX ORDER — GADOBUTROL 604.72 MG/ML
5 INJECTION INTRAVENOUS ONCE
Status: COMPLETED | OUTPATIENT
Start: 2024-02-05 | End: 2024-02-05

## 2024-02-05 RX ADMIN — GADOBUTROL 5 ML: 604.72 INJECTION INTRAVENOUS at 08:30

## 2024-02-14 ENCOUNTER — TELEPHONE (OUTPATIENT)
Dept: NEUROSURGERY | Facility: CLINIC | Age: 64
End: 2024-02-14
Payer: COMMERCIAL

## 2024-02-15 ENCOUNTER — OFFICE VISIT (OUTPATIENT)
Dept: NEUROSURGERY | Facility: CLINIC | Age: 64
End: 2024-02-15
Payer: COMMERCIAL

## 2024-02-15 VITALS — SYSTOLIC BLOOD PRESSURE: 176 MMHG | HEART RATE: 88 BPM | OXYGEN SATURATION: 100 % | DIASTOLIC BLOOD PRESSURE: 101 MMHG

## 2024-02-15 DIAGNOSIS — Z98.890 S/P LAMINECTOMY: Primary | ICD-10-CM

## 2024-02-15 PROCEDURE — 99213 OFFICE O/P EST LOW 20 MIN: CPT | Performed by: NURSE PRACTITIONER

## 2024-02-15 ASSESSMENT — PAIN SCALES - GENERAL: PAINLEVEL: MILD PAIN (3)

## 2024-02-15 NOTE — LETTER
2/15/2024         RE: Kerri Timmons  [address]  [place] [address]        Dear Colleague,    Thank you for referring your patient, Kreri Timmons, to the [facility]. Please see a copy of my visit note below.    [facility] Neurosurgery Follow-Up:     HPI: 4 months s/p left L5 and sacral laminotomies for resection of synovial cyst  with Dr. Braxton.  At the last office visit on 1/23/2024, patient reported constant pain in left buttocks with continued numbness in left calf, plantar foot, and last 3 toes.  Lumbar spine MRI was ordered for further evaluation.  Today, patient reports her pain has improved.  Physical therapy has been helpful.  Her main concern is continued numbness as stated above.  Denies any weakness or new/worsening symptoms.      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    Exam:  Constitutional:  Alert, well nourished, NAD.  HEENT: Normocephalic, atraumatic.   Pulm:  Without shortness of breath   CV:  No pitting edema of BLE.      Neurological:  Awake  Alert  Oriented x 3  Motor exam:  Iliopsoas  (hip flexion)               Right: 5/5  Left:  5/5  Quadriceps  (knee extension)       Right:  5/5  Left:  5/5  Hamstrings  (knee flexion)            Right:  5/5  Left:  5/5  Gastroc Soleus  (PF)                          Right:  5/5  Left:  5/5  Tibialis Ant  (DF)                          Right:  5/5  Left:  5/5  EHL                          Right:  5/5  Left:  5/5       Numbness in left lateral calf, plantar foot, and last 3 toes  Normal gait     Incision: Well healed     Imaging:   MRI LUMBAR SPINE WITHOUT AND WITH CONTRAST   2/5/2024 8:31 AM                                                       IMPRESSION:  1. Interval postoperative changes status post left L5-S1  hemilaminectomy. The previously demonstrated presumed synovial cyst  along the anterior margin of the right L5-S1 facet joint extending  into the right L5-S1  neural foramen is no longer definitely seen.  2. Patchy signal abnormality and enhancement in the region of the  laminectomy bed and overlying soft tissues as well as in the dorsal  and left lateral epidural space at the level of L5-S1, presumably due  to postsurgical change/granulation tissue. Possible tiny synovial cyst  along the anterior margin of the left L5-S1 facet joint.  3. There is no high-grade central spinal canal stenosis. Mild/mild to  moderate right lateral recess narrowing at L4-L5. There appears to be  mild/moderate left lateral recess narrowing at L5-S1.  4. Decreased mild to moderate right neural foraminal stenosis L5-S1.  Unchanged mild to moderate left L5-S1 neural foraminal stenosis.  Milder degrees of neural foraminal narrowing elsewhere, as described.  5. Redemonstrated grade 1 degenerative anterolisthesis of L4 on L5 and  L5 on S1 in setting of advanced facet arthropathy.      Assessment/Plan:   4 months s/p left L5 and sacral laminotomies for resection of synovial cyst  with Dr. Braxton.  Patient's main concern is continued numbness in left calf, plantar foot, and last 3 toes.  Lumbar spine MRI reviewed with patient and Dr. Braxton.  No further surgical intervention planned at this time.  KRISTINA not recommended for numbness.  Okay to continue with physical therapy as tolerated.  We discussed postop nerve healing.    Patient would like to return to work on 3/1/2024.  She is requesting the following return to work plan:  -3 12-hour shifts per pay period   -Do not schedule for more than 2 shifts in a row    Patient plans to fax workability form to our clinic.  Our team will complete paperwork at that time.    Advised patient to call our clinic with any questions or concerns.  Patient voiced understanding and agreement.      Jennifer Ramirez CNP  [facility] Neurosurgery  Tel [phone]  Pager [phone]      Patient had high blood pressure during clinic visit today.  She reports she did not  take her blood pressure medication today.  She plans to take her medication when she gets home and continue follow-up with PCP.      Again, thank you for allowing me to participate in the care of your patient.        Sincerely,        Jennifer Ramirez NP

## 2024-02-15 NOTE — NURSING NOTE
"Kerri Timmons is a 63 year old female who presents for:  Chief Complaint   Patient presents with    RECHECK     Review MRI results,         Initial Vitals:  BP (!) 161/100   Pulse 88   LMP  (LMP Unknown)   SpO2 100%  Estimated body mass index is 23.31 kg/m  as calculated from the following:    Height as of 1/23/24: 5' 3\" (1.6 m).    Weight as of 1/23/24: 131 lb 9.6 oz (59.7 kg).. There is no height or weight on file to calculate BSA. BP completed using cuff size: regular  Mild Pain (3)    Nursing Comments:       Myesha Bae    "

## 2024-02-15 NOTE — PROGRESS NOTES
[facility] Neurosurgery Follow-Up:     HPI: 4 months s/p left L5 and sacral laminotomies for resection of synovial cyst  with Dr. Braxton.  At the last office visit on 1/23/2024, patient reported constant pain in left buttocks with continued numbness in left calf, plantar foot, and last 3 toes.  Lumbar spine MRI was ordered for further evaluation.  Today, patient reports her pain has improved.  Physical therapy has been helpful.  Her main concern is continued numbness as stated above.  Denies any weakness or new/worsening symptoms.      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    Exam:  Constitutional:  Alert, well nourished, NAD.  HEENT: Normocephalic, atraumatic.   Pulm:  Without shortness of breath   CV:  No pitting edema of BLE.      Neurological:  Awake  Alert  Oriented x 3  Motor exam:  Iliopsoas  (hip flexion)               Right: 5/5  Left:  5/5  Quadriceps  (knee extension)       Right:  5/5  Left:  5/5  Hamstrings  (knee flexion)            Right:  5/5  Left:  5/5  Gastroc Soleus  (PF)                          Right:  5/5  Left:  5/5  Tibialis Ant  (DF)                          Right:  5/5  Left:  5/5  EHL                          Right:  5/5  Left:  5/5       Numbness in left lateral calf, plantar foot, and last 3 toes  Normal gait     Incision: Well healed     Imaging:   MRI LUMBAR SPINE WITHOUT AND WITH CONTRAST   2/5/2024 8:31 AM                                                       IMPRESSION:  1. Interval postoperative changes status post left L5-S1  hemilaminectomy. The previously demonstrated presumed synovial cyst  along the anterior margin of the right L5-S1 facet joint extending  into the right L5-S1 neural foramen is no longer definitely seen.  2. Patchy signal abnormality and enhancement in the region of the  laminectomy bed and overlying soft tissues as well as in the dorsal  and left lateral epidural space at the level of L5-S1, presumably due  to  postsurgical change/granulation tissue. Possible tiny synovial cyst  along the anterior margin of the left L5-S1 facet joint.  3. There is no high-grade central spinal canal stenosis. Mild/mild to  moderate right lateral recess narrowing at L4-L5. There appears to be  mild/moderate left lateral recess narrowing at L5-S1.  4. Decreased mild to moderate right neural foraminal stenosis L5-S1.  Unchanged mild to moderate left L5-S1 neural foraminal stenosis.  Milder degrees of neural foraminal narrowing elsewhere, as described.  5. Redemonstrated grade 1 degenerative anterolisthesis of L4 on L5 and  L5 on S1 in setting of advanced facet arthropathy.      Assessment/Plan:   4 months s/p left L5 and sacral laminotomies for resection of synovial cyst  with Dr. Braxton.  Patient's main concern is continued numbness in left calf, plantar foot, and last 3 toes.  Lumbar spine MRI reviewed with patient and Dr. Braxton.  No further surgical intervention planned at this time.  KRISTINA not recommended for numbness.  Okay to continue with physical therapy as tolerated.  We discussed postop nerve healing.    Patient would like to return to work on 3/1/2024.  She is requesting the following return to work plan:  -3 12-hour shifts per pay period   -Do not schedule for more than 2 shifts in a row    Patient plans to fax workability form to our clinic.  Our team will complete paperwork at that time.    Advised patient to call our clinic with any questions or concerns.  Patient voiced understanding and agreement.      Jennifer Ramirez [facility] Neurosurgery  Tel [phone]  Pager [phone]

## 2024-02-15 NOTE — PROGRESS NOTES
Patient had high blood pressure during clinic visit today.  She reports she did not take her blood pressure medication today.  She plans to take her medication when she gets home and continue follow-up with PCP.

## 2024-02-22 ENCOUNTER — TELEPHONE (OUTPATIENT)
Dept: NEUROSURGERY | Facility: CLINIC | Age: 64
End: 2024-02-22
Payer: COMMERCIAL

## 2024-02-22 NOTE — TELEPHONE ENCOUNTER
No forms at [facility].     Attempted to reach out to patient, no answer. Left voice message for them to call clinic back to further discuss. Left our fax number.

## 2024-02-22 NOTE — TELEPHONE ENCOUNTER
[facility] Call Center    Phone Message    May a detailed message be left on voicemail: yes     Reason for Call: Form or Letter   Type or form/letter needing completion: Workability Form  Provider: Jennifer Ramirez  Date form needed: asap  Once completed: Fax form to: [phone]  Contact for workability forms to employer-[employer]    Patient is calling stating that her work had faxed a workability form over to us at her last appointment, but they have not received it back yet. She is needing the for to be filled out and faxed back. She also states that she needs the last appointment records and the workability form sent to her long term disability to [employer]. The contact at [employer] is Anna. Please fax to [phone].       Action Taken: Message routed to:  Other: [facility]    Travel Screening: Not Applicable

## 2024-02-26 ENCOUNTER — DOCUMENTATION ONLY (OUTPATIENT)
Dept: NEUROSURGERY | Facility: CLINIC | Age: 64
End: 2024-02-26
Payer: COMMERCIAL

## 2024-02-26 NOTE — PROGRESS NOTES
Finished [facility] Workability form. Placed document in provider's mailbox at [facility] for review and signature.      Pt to RTW 3/1/24 with 3 12 hour shifts/pay period and no more than 2 shifts in a row per Jennifer Ramirez CNP's OV note.     Last OV note attached to form.

## 2024-02-28 NOTE — TELEPHONE ENCOUNTER
Workability form signed and faxed to [place]. Patient requesting it be faxed to [facility] as well, along with OV note, imaging results.     Faxed to number [phone] and confirmed at 1035AM

## 2024-02-28 NOTE — PROGRESS NOTES
Workability form faxed to [facility] at fax number [phone] and confirmed at 1035AM

## 2024-02-28 NOTE — PROGRESS NOTES
Faxed Workability for for [place] February 28, 2024 to fax number [phone] and HIM    Right Fax confirmed at 9:32 AM    Jaime Garber

## 2024-02-28 NOTE — TELEPHONE ENCOUNTER
"[facility] Call Center    Phone Message    May a detailed message be left on voicemail: yes     Reason for Call: Other: Patient called requesting to speak with someone in the clinic who can verify to [employer] the last date of service including \"what is going on medically\". She states [employer] and her employer are needing her workability. Patient wonders if this can be reviewed today, stating she has been waiting since 2/15/2024. Please review.     Action Taken: Message routed to:  Other: Spine & Brain     Travel Screening: Not Applicable                                                                   "

## 2024-04-09 ENCOUNTER — TELEPHONE (OUTPATIENT)
Dept: NEUROSURGERY | Facility: CLINIC | Age: 64
End: 2024-04-09
Payer: COMMERCIAL

## 2024-04-09 ENCOUNTER — MYC MEDICAL ADVICE (OUTPATIENT)
Dept: NEUROSURGERY | Facility: CLINIC | Age: 64
End: 2024-04-09
Payer: COMMERCIAL

## 2024-04-09 NOTE — TELEPHONE ENCOUNTER
Patient is 25 weeks s/p left sacral laminectomy for resection of synovial cyst on 10/17/23 with Dr. Braxton.    Called patient to discuss rtw request. Patient reports she has returned to work at 45% - 3 12s per pay period, no more than 2 shifts in a row. Plan to re-evaluate on April 15th.     Patient states she has had some increased nerve pain in LLE, calf and foot. Patient states the pain is never gone but it varies between medium pain and worse pain.     Patient discussed with manager and would like to keep current restrictions until May 15 and check in again after that.     Advised a message will be routed to the provider team for review. If approved we will contact  [facility] to request a new form be sent to clinic to be completed.     Patient would like a phone call update once we hear back from provider team. Ok to LVM per patient. Patient would also like a mywaves message to see if it works.

## 2024-04-09 NOTE — TELEPHONE ENCOUNTER
Called patient to discuss LTD through PCP. Per SHANE ok to continue current restrictions but patient should follow-up with Jennifer prior to expiration of restrictions to further discuss.    Patient states she has not yet followed up with PCP regarding LTD but she will. 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 She is hoping this is her last extension.     Staff message sent to scheduling team to contact patient to schedule follow-up with Jennifer prior to May 15th.     Called [facility] to request they fax a blank workability form to clinic. Call center states patient will have to e-mail [email or web address] to make this request. Scrippedt message sent to patient to update.     Will await fax of blank workability form. When form is completed it will need to be faxed to [employer] ([phone]) and [facility] (fax number on form).    Work restrictions to remain the same as last workability form completed: 3 12s per pay period, no more than 2 shifts in a row. See media tab. Restrictions in place until May 15.

## 2024-04-09 NOTE — TELEPHONE ENCOUNTER
[facility] Call Center    Phone Message    May a detailed message be left on voicemail: yes     Reason for Call: Pt called.  She said that she is back to work time and she needs a new workability form filled out so she can work part time for a few more weeks.  She is requesting a call back to discuss the details.  Thanks.

## 2024-04-10 NOTE — TELEPHONE ENCOUNTER
[facility] workability form received and completed with restrictions in note below. Restrictions in place until 5/15/24.    Placed in Dr. Braxton's bin at  for signature.

## 2024-04-19 NOTE — TELEPHONE ENCOUNTER
Faxed updated workability form April 17, 2024 to fax number [phone] ([facility] [employer]) and [employer]    [facility] fax has continuous transmission error to fax #[phone]    Addendum 04/22/24: Spoke with [facility] rep about continuous fax error to [phone]. Was advised to scan to secure email instead at [email or web address]. Sent 04/22/24 at 10:11 AM.    Right Fax confirmed at 1:02 PM    Harriett Giron

## 2024-05-02 ENCOUNTER — DOCUMENTATION ONLY (OUTPATIENT)
Dept: NEUROSURGERY | Facility: CLINIC | Age: 64
End: 2024-05-02

## 2024-05-02 ENCOUNTER — OFFICE VISIT (OUTPATIENT)
Dept: NEUROSURGERY | Facility: CLINIC | Age: 64
End: 2024-05-02
Payer: COMMERCIAL

## 2024-05-02 VITALS — DIASTOLIC BLOOD PRESSURE: 81 MMHG | OXYGEN SATURATION: 98 % | SYSTOLIC BLOOD PRESSURE: 121 MMHG | HEART RATE: 82 BPM

## 2024-05-02 DIAGNOSIS — Z98.890 S/P LAMINECTOMY: Primary | ICD-10-CM

## 2024-05-02 PROCEDURE — 99213 OFFICE O/P EST LOW 20 MIN: CPT | Performed by: NURSE PRACTITIONER

## 2024-05-02 NOTE — LETTER
5/2/2024         RE: Kerri Timmons  [address]  [place] [address]        Dear Colleague,    Thank you for referring your patient, Kerri Timmons, to the [facility]. Please see a copy of my visit note below.    [facility] Neurosurgery Follow-Up:     HPI: 7 months s/p left L5 and sacral laminotomies for resection of synovial cyst with Dr. Braxton. Patient reports low back fatigue since she returned to work in March. She has continued paresthesias in left calf, plantar foot, and last 3 toes, similar to last office visit. Intermittent left buttocks pain. Denies any weakness or new symptoms. PT has been helpful. She would like to extend her work restrictions for another 6 weeks so she can continue to work with PT.     Exam:  Constitutional:  Alert, well nourished, NAD.  HEENT: Normocephalic, atraumatic.   Pulm:  Without shortness of breath   CV:  No pitting edema of BLE.      /81   Pulse 82   LMP  (LMP Unknown)   SpO2 98%     Neurological:  Awake  Alert  Oriented x 3  Motor exam:  Hip Flexor:                Right: 5/5  Left:  5/5  Quadriceps:              Right:  5/5  Left:  5/5  Hamstrings:              Right:  5/5  Left:  5/5  Gastroc Soleus:        Right:  5/5  Left:  5/5  Tib/Ant:                      Right:  5/5  Left:  5/5  EHL:                          Right:  5/5  Left:  5/5      Numbness in left lateral calf, plantar foot, and last 3 toes  Normal gait        Incision: Well healed     Imaging:   MRI LUMBAR SPINE WITHOUT AND WITH CONTRAST   2/5/2024 8:31 AM                                                       IMPRESSION:  1. Interval postoperative changes status post left L5-S1  hemilaminectomy. The previously demonstrated presumed synovial cyst  along the anterior margin of the right L5-S1 facet joint extending  into the right L5-S1 neural foramen is no longer definitely seen.  2. Patchy signal abnormality and enhancement in the region of  the  laminectomy bed and overlying soft tissues as well as in the dorsal  and left lateral epidural space at the level of L5-S1, presumably due  to postsurgical change/granulation tissue. Possible tiny synovial cyst  along the anterior margin of the left L5-S1 facet joint.  3. There is no high-grade central spinal canal stenosis. Mild/mild to  moderate right lateral recess narrowing at L4-L5. There appears to be  mild/moderate left lateral recess narrowing at L5-S1.  4. Decreased mild to moderate right neural foraminal stenosis L5-S1.  Unchanged mild to moderate left L5-S1 neural foraminal stenosis.  Milder degrees of neural foraminal narrowing elsewhere, as described.  5. Redemonstrated grade 1 degenerative anterolisthesis of L4 on L5 and  L5 on S1 in setting of advanced facet arthropathy.     Assessment/Plan:   7 months s/p left L5 and sacral laminotomies for resection of synovial cyst    We discussed MRI results and symptoms. Patient is not interested in injections. She would like to extend her work restrictions for another 6 weeks so she can continue to work with PT. Care team will complete paperwork and fax to [employer] and employer. Patient is requesting the following restrictions:    -3 12-hour shifts per pay period   -Do not schedule for more than 2 shifts in a row    Advised patient to call our clinic if symptoms persist, change, or worsen. Patient voiced understanding and agreement.      Jennifer Ramirez CNP  [facility] Neurosurgery  Tel [phone]  Pager [phone]      Again, thank you for allowing me to participate in the care of your patient.        Sincerely,        Jennifer Ramirez, SHANTI

## 2024-05-02 NOTE — NURSING NOTE
"Kerri Timmons is a 63 year old female who presents for:  Chief Complaint   Patient presents with    RECHECK     To discuss ongoing pain and workability per staff messsage.        Initial Vitals:  /81   Pulse 82   LMP  (LMP Unknown)   SpO2 98%  Estimated body mass index is 23.31 kg/m  as calculated from the following:    Height as of 1/23/24: 5' 3\" (1.6 m).    Weight as of 1/23/24: 131 lb 9.6 oz (59.7 kg).. There is no height or weight on file to calculate BSA. BP completed using cuff size: large  Data Unavailable    Nursing Comments:       Myesha Bae    "

## 2024-05-02 NOTE — PROGRESS NOTES
[facility] Neurosurgery Follow-Up:     HPI: 7 months s/p left L5 and sacral laminotomies for resection of synovial cyst with Dr. Braxton. Patient reports low back fatigue since she returned to work in March. She has continued paresthesias in left calf, plantar foot, and last 3 toes, similar to last office visit. Intermittent left buttocks pain. Denies any weakness or new symptoms. PT has been helpful. She would like to extend her work restrictions for another 6 weeks so she can continue to work with PT.     Exam:  Constitutional:  Alert, well nourished, NAD.  HEENT: Normocephalic, atraumatic.   Pulm:  Without shortness of breath   CV:  No pitting edema of BLE.      /81   Pulse 82   LMP  (LMP Unknown)   SpO2 98%     Neurological:  Awake  Alert  Oriented x 3  Motor exam:  Hip Flexor:                Right: 5/5  Left:  5/5  Quadriceps:              Right:  5/5  Left:  5/5  Hamstrings:              Right:  5/5  Left:  5/5  Gastroc Soleus:        Right:  5/5  Left:  5/5  Tib/Ant:                      Right:  5/5  Left:  5/5  EHL:                          Right:  5/5  Left:  5/5      Numbness in left lateral calf, plantar foot, and last 3 toes  Normal gait        Incision: Well healed     Imaging:   MRI LUMBAR SPINE WITHOUT AND WITH CONTRAST   2/5/2024 8:31 AM                                                       IMPRESSION:  1. Interval postoperative changes status post left L5-S1  hemilaminectomy. The previously demonstrated presumed synovial cyst  along the anterior margin of the right L5-S1 facet joint extending  into the right L5-S1 neural foramen is no longer definitely seen.  2. Patchy signal abnormality and enhancement in the region of the  laminectomy bed and overlying soft tissues as well as in the dorsal  and left lateral epidural space at the level of L5-S1, presumably due  to postsurgical change/granulation tissue. Possible tiny synovial cyst  along the anterior margin of the left L5-S1 facet  joint.  3. There is no high-grade central spinal canal stenosis. Mild/mild to  moderate right lateral recess narrowing at L4-L5. There appears to be  mild/moderate left lateral recess narrowing at L5-S1.  4. Decreased mild to moderate right neural foraminal stenosis L5-S1.  Unchanged mild to moderate left L5-S1 neural foraminal stenosis.  Milder degrees of neural foraminal narrowing elsewhere, as described.  5. Redemonstrated grade 1 degenerative anterolisthesis of L4 on L5 and  L5 on S1 in setting of advanced facet arthropathy.     Assessment/Plan:   7 months s/p left L5 and sacral laminotomies for resection of synovial cyst    We discussed MRI results and symptoms. Patient is not interested in injections. She would like to extend her work restrictions for another 6 weeks so she can continue to work with PT. Care team will complete paperwork and fax to [employer] and employer. Patient is requesting the following restrictions:    -3 12-hour shifts per pay period   -Do not schedule for more than 2 shifts in a row    Advised patient to call our clinic if symptoms persist, change, or worsen. Patient voiced understanding and agreement.      Jennifer Ramirez [facility] Neurosurgery  Tel [phone]  Pager [phone]

## 2024-05-02 NOTE — PROGRESS NOTES
Workability form completed. Faxed to [employer] at [phone] and [employer] at [phone]. Faxed to scanning. Verified all via RightFax. Placed origional in [facility] neurosurgery drawer.    Maude Chery RN on 5/2/2024 at 4:22 PM

## 2024-05-14 DIAGNOSIS — I47.10 PAROXYSMAL SUPRAVENTRICULAR TACHYCARDIA (H): ICD-10-CM

## 2024-05-14 DIAGNOSIS — I10 HYPERTENSION GOAL BP (BLOOD PRESSURE) < 140/90: ICD-10-CM

## 2024-05-14 DIAGNOSIS — F33.1 MAJOR DEPRESSIVE DISORDER, RECURRENT EPISODE, MODERATE (H): ICD-10-CM

## 2024-05-15 RX ORDER — VERAPAMIL HYDROCHLORIDE 240 MG/1
TABLET, FILM COATED, EXTENDED RELEASE ORAL
Qty: 90 TABLET | Refills: 3 | OUTPATIENT
Start: 2024-05-15

## 2024-05-15 RX ORDER — LOSARTAN POTASSIUM 100 MG/1
100 TABLET ORAL DAILY
Qty: 90 TABLET | Refills: 3 | OUTPATIENT
Start: 2024-05-15

## 2024-05-15 RX ORDER — DULOXETIN HYDROCHLORIDE 60 MG/1
60 CAPSULE, DELAYED RELEASE ORAL DAILY
Qty: 90 CAPSULE | Refills: 3 | OUTPATIENT
Start: 2024-05-15

## 2024-05-16 ENCOUNTER — TELEPHONE (OUTPATIENT)
Dept: NEUROSURGERY | Facility: CLINIC | Age: 64
End: 2024-05-16
Payer: COMMERCIAL

## 2024-05-16 NOTE — TELEPHONE ENCOUNTER
[facility] Call Center    Phone Message    May a detailed message be left on voicemail: yes     Reason for Call: Other: Patient's work and [employer] is still waiting on the office notes from the last visit . Please fax over to [employer] at [phone] and [employer] at [phone].     Action Taken: Other: Neurosurgery    Travel Screening: Not Applicable

## 2024-05-29 NOTE — TELEPHONE ENCOUNTER
[facility] Call Center    Phone Message    May a detailed message be left on voicemail: yes     Reason for Call: Other: Dr Torres from [facility] called and has questions regarding any current work restrictions for the patient. Dr Torres will send a letter via fax  as well within the next day. Fax can be returned instead of calling back directly if that is preferred.      Action Taken: Message routed to:  Other: Neurosurgery    Travel Screening: Not Applicable

## 2024-06-03 NOTE — TELEPHONE ENCOUNTER
Received [employer] Dr. Torres questions 06/03/24. Placed in [employer] bin for review and signature.

## 2024-06-05 ENCOUNTER — DOCUMENTATION ONLY (OUTPATIENT)
Dept: NEUROSURGERY | Facility: CLINIC | Age: 64
End: 2024-06-05
Payer: COMMERCIAL

## 2024-06-13 ENCOUNTER — OFFICE VISIT (OUTPATIENT)
Dept: NEUROSURGERY | Facility: CLINIC | Age: 64
End: 2024-06-13
Payer: COMMERCIAL

## 2024-06-13 ENCOUNTER — DOCUMENTATION ONLY (OUTPATIENT)
Dept: NEUROSURGERY | Facility: CLINIC | Age: 64
End: 2024-06-13

## 2024-06-13 VITALS — HEART RATE: 80 BPM | OXYGEN SATURATION: 97 % | SYSTOLIC BLOOD PRESSURE: 123 MMHG | DIASTOLIC BLOOD PRESSURE: 82 MMHG

## 2024-06-13 DIAGNOSIS — M71.30 SYNOVIAL CYST: ICD-10-CM

## 2024-06-13 DIAGNOSIS — Z98.890 S/P LAMINECTOMY: Primary | ICD-10-CM

## 2024-06-13 PROCEDURE — 99213 OFFICE O/P EST LOW 20 MIN: CPT | Performed by: NURSE PRACTITIONER

## 2024-06-13 ASSESSMENT — PAIN SCALES - GENERAL: PAINLEVEL: MILD PAIN (3)

## 2024-06-13 NOTE — LETTER
6/13/2024      Kerri Timmons  [address]  [place] [address]      Dear Colleague,    Thank you for referring your patient, Kerri Timmons, to [facility] Please see a copy of my visit note below.    [facility] Neurosurgery Follow-Up:     HPI: 8 months s/p left L5 and sacral laminotomies for resection of synovial cyst with Dr. Braxton. Patient presents for follow-up to discuss [facility] paperwork. She  reports low back fatigue that occurs with work and activity. She has continued numbness in left calf, plantar foot, and last 3 toes, stable to previous clinic visit. Previous intermittent left buttocks pain is slowly improving. Denies any weakness or new symptoms. She continues to work with PT. She would like to extend her work restrictions until September 1st so she can continue to work with PT.     Exam:  Constitutional:  Alert, well nourished, NAD.  HEENT: Normocephalic, atraumatic.   Pulm:  Without shortness of breath   CV:  No pitting edema of BLE.      /82   Pulse 80   LMP  (LMP Unknown)   SpO2 97%     Neurological:  Awake  Alert  Oriented x 3  Motor exam:  Hip Flexor:                Right: 5/5  Left:  5/5  Quadriceps:              Right:  5/5  Left:  5/5  Hamstrings:              Right:  5/5  Left:  5/5  Gastroc Soleus:        Right:  5/5  Left:  5/5  Tib/Ant:                      Right:  5/5  Left:  5/5  EHL:                          Right:  5/5  Left:  5/5      Numbness in left lateral calf, plantar foot, and last 3 toes  Normal gait        Incision: Well healed     Imaging:   MRI LUMBAR SPINE WITHOUT AND WITH CONTRAST   2/5/2024 8:31 AM                                                       IMPRESSION:  1. Interval postoperative changes status post left L5-S1  hemilaminectomy. The previously demonstrated presumed synovial cyst  along the anterior margin of the right L5-S1 facet joint extending  into the right L5-S1 neural foramen is no longer definitely  seen.  2. Patchy signal abnormality and enhancement in the region of the  laminectomy bed and overlying soft tissues as well as in the dorsal  and left lateral epidural space at the level of L5-S1, presumably due  to postsurgical change/granulation tissue. Possible tiny synovial cyst  along the anterior margin of the left L5-S1 facet joint.  3. There is no high-grade central spinal canal stenosis. Mild/mild to  moderate right lateral recess narrowing at L4-L5. There appears to be  mild/moderate left lateral recess narrowing at L5-S1.  4. Decreased mild to moderate right neural foraminal stenosis L5-S1.  Unchanged mild to moderate left L5-S1 neural foraminal stenosis.  Milder degrees of neural foraminal narrowing elsewhere, as described.  5. Redemonstrated grade 1 degenerative anterolisthesis of L4 on L5 and  L5 on S1 in setting of advanced facet arthropathy.     Assessment/Plan:   8 months s/p left L5 and sacral laminotomies for resection of synovial cyst    We reviewed MRI results from 2/5/24. We discussed the option of obtaining an updated lumbar spine MRI to evaluate ongoing numbness, but  patient declined  at this time since her symptoms are stable and  not worsening. She would like to extend her work restrictions until September 1st so she can continue to work with PT. Care team will complete paperwork and fax to [employer] and employer. Patient is requesting the following restrictions:    -3 12-hour shifts per pay period   -Do not schedule for more than 2 shifts in a row    Advised patient to call our clinic if symptoms persist, change, or worsen. Patient voiced understanding and agreement.      Jennifer Ramirez CNP  [facility] Neurosurgery  Tel [phone]  Pager [phone]      Again, thank you for allowing me to participate in the care of your patient.        Sincerely,        Jennifer Ramirez, SHANTI

## 2024-06-13 NOTE — PROGRESS NOTES
Faxed  [facility] workability form  June 13, 2024 to fax number [employer] (fax: [phone]) and to [facility] (fax: [phone]) and to [employer].    Right Fax confirmed at 1:41 PM    Myesha Bae

## 2024-06-13 NOTE — PROGRESS NOTES
[facility] Neurosurgery Follow-Up:     HPI: 8 months s/p left L5 and sacral laminotomies for resection of synovial cyst with Dr. Braxton. Patient presents for follow-up to discuss [facility] paperwork. She  reports low back fatigue that occurs with work and activity. She has continued numbness in left calf, plantar foot, and last 3 toes, stable to previous clinic visit. Previous intermittent left buttocks pain is slowly improving. Denies any weakness or new symptoms. She continues to work with PT. She would like to extend her work restrictions until September 1st so she can continue to work with PT.     Exam:  Constitutional:  Alert, well nourished, NAD.  HEENT: Normocephalic, atraumatic.   Pulm:  Without shortness of breath   CV:  No pitting edema of BLE.      /82   Pulse 80   LMP  (LMP Unknown)   SpO2 97%     Neurological:  Awake  Alert  Oriented x 3  Motor exam:  Hip Flexor:                Right: 5/5  Left:  5/5  Quadriceps:              Right:  5/5  Left:  5/5  Hamstrings:              Right:  5/5  Left:  5/5  Gastroc Soleus:        Right:  5/5  Left:  5/5  Tib/Ant:                      Right:  5/5  Left:  5/5  EHL:                          Right:  5/5  Left:  5/5      Numbness in left lateral calf, plantar foot, and last 3 toes  Normal gait        Incision: Well healed     Imaging:   MRI LUMBAR SPINE WITHOUT AND WITH CONTRAST   2/5/2024 8:31 AM                                                       IMPRESSION:  1. Interval postoperative changes status post left L5-S1  hemilaminectomy. The previously demonstrated presumed synovial cyst  along the anterior margin of the right L5-S1 facet joint extending  into the right L5-S1 neural foramen is no longer definitely seen.  2. Patchy signal abnormality and enhancement in the region of the  laminectomy bed and overlying soft tissues as well as in the dorsal  and left lateral epidural space at the level of L5-S1, presumably due  to postsurgical change/granulation  tissue. Possible tiny synovial cyst  along the anterior margin of the left L5-S1 facet joint.  3. There is no high-grade central spinal canal stenosis. Mild/mild to  moderate right lateral recess narrowing at L4-L5. There appears to be  mild/moderate left lateral recess narrowing at L5-S1.  4. Decreased mild to moderate right neural foraminal stenosis L5-S1.  Unchanged mild to moderate left L5-S1 neural foraminal stenosis.  Milder degrees of neural foraminal narrowing elsewhere, as described.  5. Redemonstrated grade 1 degenerative anterolisthesis of L4 on L5 and  L5 on S1 in setting of advanced facet arthropathy.     Assessment/Plan:   8 months s/p left L5 and sacral laminotomies for resection of synovial cyst    We reviewed MRI results from 2/5/24. We discussed the option of obtaining an updated lumbar spine MRI to evaluate ongoing numbness, but  patient declined  at this time since her symptoms are stable and  not worsening. She would like to extend her work restrictions until September 1st so she can continue to work with PT. Care team will complete paperwork and fax to [employer] and employer. Patient is requesting the following restrictions:    -3 12-hour shifts per pay period   -Do not schedule for more than 2 shifts in a row    Advised patient to call our clinic if symptoms persist, change, or worsen. Patient voiced understanding and agreement.      Jennifer Ramirez [facility] Neurosurgery  Tel [phone]  Pager [phone]

## 2024-06-13 NOTE — NURSING NOTE
"Kerri Timmons is a 63 year old female who presents for:  Chief Complaint   Patient presents with    RECHECK     Follow up for nerve issue. Numbness on left calf and foot.         Initial Vitals:  /82   Pulse 80   LMP  (LMP Unknown)   SpO2 97%  Estimated body mass index is 23.31 kg/m  as calculated from the following:    Height as of 1/23/24: 5' 3\" (1.6 m).    Weight as of 1/23/24: 131 lb 9.6 oz (59.7 kg).. There is no height or weight on file to calculate BSA. BP completed using cuff size: regular  Mild Pain (3)    Nursing Comments:       Myesha Bae    "

## 2024-06-30 DIAGNOSIS — I10 HYPERTENSION GOAL BP (BLOOD PRESSURE) < 140/90: ICD-10-CM

## 2024-07-01 RX ORDER — LOSARTAN POTASSIUM 100 MG/1
100 TABLET ORAL DAILY
Qty: 90 TABLET | Refills: 3 | OUTPATIENT
Start: 2024-07-01

## 2024-07-02 ENCOUNTER — TELEPHONE (OUTPATIENT)
Dept: NEUROSURGERY | Facility: CLINIC | Age: 64
End: 2024-07-02
Payer: COMMERCIAL

## 2024-07-02 NOTE — TELEPHONE ENCOUNTER
"LOV with Jennifer Ramirez, CNP 6/13/24. Workability form completed and signed by Jennifer at visit. Restrictions at that time were three 12 hr shifts per pay period and do not schedule more than 2 shifts in a row.     Called patient to further discuss. She needs a new workability from filled out to [employer] from 7/2/24-9/15/24 with updated restrictions of :    - Four 12 hr shifts (60%) in 2 week pay period  -Do not schedule more than 2 shifts in a row    Patient continues to have same symptoms as when seen last in clinic on 6/13.    Will route enc to [employer] if ok to update form with above restrictions as Jennifer is OOO.      Patient is also battling with [employer] ins who is stating that some/part of her surgery is \"deemed experimental\" and is not covered under insurance plan and is stuck with a large bill. She's been communicating with billing and insurance said she's getting bounced around, was advised to appeal but she has more questions with this process and wants to know what was considered \"experimental.\" She expressed frustration and not getting much help or answers with billing/financial office or from [employer].     Writer will reach out to surgery scheduler for guidance on who patient should try to contact for which dept deals with post procedure denials.            "

## 2024-07-02 NOTE — TELEPHONE ENCOUNTER
[facility] Call Center    Phone Message    May a detailed message be left on voicemail: yes     Reason for Call: Pt is requesting a call back to discuss getting a workability form to give to her employer.  Please call her back to discuss.  Thanks.

## 2024-07-03 NOTE — CONFIDENTIAL NOTE
"Attempted to reach out to patient X 2 today, no answer. Left voice messages. Will send her myc message with updates from our telephone conversation yesterday.     Writer heard back from surgery scheduler for guidance on post procedural denials.     She said Once procedure is completed, [facility] cannot assist. Patients need to work with Billing Post-Op: [phone]. Other option would be to send to [employer] and have them write appeal.     ~patient will need to obtain more info from insurance or billing on what part of the procedure is \"deemed experimental\" that wasn't covered so we have more information to give our SHANE team should patient choose to move forward with appeal.     Second update- Armida wanted update on how she is doing, if any new or worsening radicular pain?   -when I spoke to patient yesterday, she was having continuation of symptoms from OV with Jennifer on 6/13. Was trying to reach patient today confirm and if anything has changed symptomatically as of today.     Armida said if patient doing well we can update/fill out new workability form with patient's requested restrictions of:  -4 x 12 hour shifts in 2 week pay period  -do not schedule for more than 2 shifts in a row     Start of these restrictions would be 7/2/24 through end date of 9/15/24    -emailed new workability form to  nurses    Writer will follow-up with patient regarding insurance issue on Monday   "

## 2024-07-03 NOTE — CONFIDENTIAL NOTE
Received updates- need to discuss with patient.     Attempted to reach out to patient, no answer. Left voice message asking patient to call clinic back to further discuss.

## 2024-07-08 NOTE — CONFIDENTIAL NOTE
Spoke to patient. Reviewed 7/2 note with  updates. Refer to other note for details.     Patient confirms NO new symptoms just a continuation of symptoms from LOV. She does note her left leg numbness from knee to foot is exacerbated when working 2 12hr shifts.     Patient to keep us updated should she have any new or worsening symptoms. Updated her that we will work on getting her new workability form completed. She verbalized understanding.

## 2024-07-17 NOTE — TELEPHONE ENCOUNTER
Forms signed and sent to [employer] at [phone] and to HIM for our records.    Right Fax confirmed at 11:00am on 07/17/24.

## 2024-09-03 ENCOUNTER — TELEPHONE (OUTPATIENT)
Dept: NEUROSURGERY | Facility: CLINIC | Age: 64
End: 2024-09-03
Payer: COMMERCIAL

## 2024-09-03 DIAGNOSIS — M71.30 SYNOVIAL CYST: ICD-10-CM

## 2024-09-03 DIAGNOSIS — Z98.890 S/P LAMINECTOMY: Primary | ICD-10-CM

## 2024-09-03 NOTE — TELEPHONE ENCOUNTER
DOS: 10/17/23  Surgery: left L5 and sacral laminotomies for resection of synovial cys   Surgeon: Dr Braxton   Last visit: Jennifer. Approved 3 12-hour shift per pay period, no more than 2 shifts in a row    Calls to ask for extension of work restrictions another 3 months to December 15th  Wants to increase to 4 12-hours shifts per pay period     Last workability form on file was active from 6/13-9/1 for 3 12-hour shift ppp and no more than 2 shifts in a row.

## 2024-09-03 NOTE — TELEPHONE ENCOUNTER
[facility] Call Center    Phone Message    May a detailed message be left on voicemail: yes     Reason for Call:  Patient called and stated she needs a letter sent to her work that she can only work 60%, 4 12hour shifts in 2 weeks. The letter will need to get her through December 15th 2024 because then she is going to to retire. If any question s please contact patient. She also said that provider should have all the information on where to fax it to. Please review      Action Taken: Message routed to:  Other: [facility]     Travel Screening: Not Applicable     Date of Service:

## 2024-09-04 ENCOUNTER — DOCUMENTATION ONLY (OUTPATIENT)
Dept: NEUROSURGERY | Facility: CLINIC | Age: 64
End: 2024-09-04
Payer: COMMERCIAL

## 2024-09-04 NOTE — TELEPHONE ENCOUNTER
"Ok with provider to extend work restrictions   Called patient to clarify  Needs workability form not a letter  Do not include \"no more than 2 shifts in a row\" as her employer will no longer support this request     She reports she getting some buttock cramps more often but luckily no full on spasms   Reports Same sx as when seen last in June but seem worse recently  No new sx   Concerned that \"something could be starting\" to be wrong  She attributes the \"flares\" to possibly Working more   Asks for updated MRI  Also asks if an injection would be appropriate  Advised we will discuss MRI with provider and tbd on an injection     Will route to provider         "

## 2024-09-04 NOTE — TELEPHONE ENCOUNTER
Per provider, ok for MRI followed by a telephone visit for imaging review and follow up questions    MRI ordered   Updated patient via [employer] workability form completed   See documentation only encounter for more details

## 2024-09-04 NOTE — PROGRESS NOTES
[place] Workability form completed and signed     Faxed  workability Form September 4, 2024 to fax number     Right Fax confirmed at 3:45 pm    Nicolasa Bueno RN

## 2024-10-08 ENCOUNTER — TELEPHONE (OUTPATIENT)
Dept: NEUROSURGERY | Facility: CLINIC | Age: 64
End: 2024-10-08
Payer: COMMERCIAL

## 2024-10-08 NOTE — TELEPHONE ENCOUNTER
[facility] Call Center    Phone Message    May a detailed message be left on voicemail: yes     Reason for Call: Other: Patient is calling requesting to speak with a nurse about her symptoms, she is scheduled for her MRI but would like to discuss what she can do in the meantime since she is still working. Please call back to discuss further.      Action Taken: Message routed to:  Other: [facility]    Travel Screening: Not Applicable

## 2024-10-08 NOTE — TELEPHONE ENCOUNTER
"Patient is scheduled for MRI on 10/14 as recommended by Jennifer Ramirez CNP. A telephone appointment for results review still needs to be coordinated.     Updated workability form completed on 9/4, below was approved-  \"Calls to ask for extension of work restrictions another 3 months to December 15th  Wants to increase to 4 12-hours shifts per pay period\".     Called patient to discuss as requested.     She reports continued symptoms, same as when we spoke to patient on 9/4 and MRI was ordered. She wanted to ensure the team was aware. She worked yesterday causing her pain to act up again.     She has been icing which has been helpful, she wanted to verify it is OK to use ice and heat PRN. Reviewed this is OK and recommended if helpful for symptoms.     She has also been doing some of the stretches PT taught her. Recommended incorporating tylenol and ibuprofen PRN for symptoms.     Patient in agreement with plan. Reviewed scheduling will reach out to coordinate telephone appointment for MRI review with Jennifer Ramirez CNP.   "

## 2024-10-10 NOTE — TELEPHONE ENCOUNTER
Message left for patient - provider Jennifer Ramirez would like to schedule a phone visit on 10-21-24 at 3:30p to review the results of recent imaging and discuss next steps in care plan.    Patient advised to call [phone] to schedule this appointment. Please transfer call to clinic back line as this will be added appointment to provider's schedule.

## 2024-10-14 ENCOUNTER — HOSPITAL ENCOUNTER (OUTPATIENT)
Dept: MRI IMAGING | Facility: CLINIC | Age: 64
Discharge: HOME | End: 2024-10-14
Attending: NURSE PRACTITIONER | Admitting: NURSE PRACTITIONER
Payer: COMMERCIAL

## 2024-10-14 DIAGNOSIS — Z98.890 S/P LAMINECTOMY: ICD-10-CM

## 2024-10-14 PROCEDURE — A9585 GADOBUTROL INJECTION: HCPCS | Performed by: NURSE PRACTITIONER

## 2024-10-14 PROCEDURE — 255N000002 HC RX 255 OP 636: Performed by: NURSE PRACTITIONER

## 2024-10-14 PROCEDURE — 72158 MRI LUMBAR SPINE W/O & W/DYE: CPT

## 2024-10-14 RX ORDER — GADOBUTROL 604.72 MG/ML
6 INJECTION INTRAVENOUS ONCE
Status: COMPLETED | OUTPATIENT
Start: 2024-10-14 | End: 2024-10-14

## 2024-10-14 RX ADMIN — GADOBUTROL 6 ML: 604.72 INJECTION INTRAVENOUS at 06:46

## 2024-10-14 NOTE — TELEPHONE ENCOUNTER
2nd attempt to schedule:    Message left for patient - provider Jennifer Mazason would like to schedule a phone visit on 10-21-24 at 3:30p to review the results of recent imaging and discuss next steps in care plan.     Patient advised to call [phone] to schedule this appointment. Please transfer call to clinic back line as this will be added appointment to provider's schedule.

## 2024-10-16 NOTE — TELEPHONE ENCOUNTER
3rd attempt to schedule:    Message left for patient - provider Jennifer Ramirez would like to schedule a phone visit on 10-21-24 at 3:30p to review the results of recent imaging and discuss next steps in care plan.     Patient advised to call [phone] to schedule this appointment. Please transfer call to clinic back line as this will be added appointment to provider's schedule

## 2024-10-18 ENCOUNTER — MYC MEDICAL ADVICE (OUTPATIENT)
Dept: NEUROSURGERY | Facility: CLINIC | Age: 64
End: 2024-10-18
Payer: COMMERCIAL

## 2024-10-19 ENCOUNTER — HEALTH MAINTENANCE LETTER (OUTPATIENT)
Age: 64
End: 2024-10-19

## 2024-10-21 ENCOUNTER — VIRTUAL VISIT (OUTPATIENT)
Dept: NEUROSURGERY | Facility: CLINIC | Age: 64
End: 2024-10-21
Payer: COMMERCIAL

## 2024-10-21 DIAGNOSIS — Z98.890 S/P LAMINECTOMY: Primary | ICD-10-CM

## 2024-10-21 PROCEDURE — 99443 PR PHYSICIAN TELEPHONE EVALUATION 21-30 MIN: CPT | Performed by: NURSE PRACTITIONER

## 2024-10-21 NOTE — LETTER
"10/21/2024      Kerri Timmons  [address] [place] [address]      Dear Colleague,    Thank you for referring your patient, Kerri Timmons, to the [facility]UROLOG[facility]. Please see a copy of my visit note below.    The patient has been notified of following:     \"This telephone visit will be conducted via a call between you and your physician/provider. We have found that certain health care needs can be provided without the need for a physical exam.  This service lets us provide the care you need with a short phone conversation.  If a prescription is necessary we can send it directly to your pharmacy.  If lab work is needed we can place an order for that and you can then stop by our lab to have the test done at a later time.    If during the course of the call the physician/provider feels a telephone visit is not appropriate, you will not be charged for this service.\"     Physician/provider has received verbal consent for a Telephone Visit from the patient? Yes     [facility]  Virtual Visit       HPI: Kerri Timmons is a 64 year old female who presents for a telephone visit to review MRI results. Patient is 1 year s/p left L5 and sacral laminotomies for resection of synovial cyst on 10/17/23 with Dr. Braxton. Patient's main concern is continued numbness in left calf, plantar foot, and last 3 toes. Symptoms have not worsened since previous clinic visit. She has intermittent \"spasms\" in her left calf that occurs with certain activities such as getting out of the bathtub or putting on her shoes. She reports intermittent low back pain that radiates to left buttocks, mostly occurs when working 12 hours shifts. Denies any weakness or new symptoms.     ROS: 10 point ROS neg other than the symptoms noted above in the HPI.    Imaging:   MR LUMBAR SPINE W/O & W CONTRAST  10/14/2024 7:20 AM                                         IMPRESSION: L5-S1 postsurgical changes, " "multilevel lumbar spondylosis,  as above similar to prior. No progressive degeneration since comparison.     Assessment:   64F presents 1 year s/p left L5 and sacral laminotomies for resection of synovial cyst on 10/17/23 with Dr. Braxton. Patient's main concern is continued numbness in left calf, plantar foot, and last 3 toes. She reports intermittent low back pain that radiates to left buttocks and occasional \"spasms\" in left calf. We reviewed recent lumbar spine MRI results and discussed next steps.     Plan:  -LLE EMG ordered for further evaluation  -Referral to [facility] for lumbar KRISTINA  -Continue with PT as tolerated  -Will call patient with EMG results and next steps    Patient voiced understanding and agreement.      Phone call duration: 27 minutes  Provider location: Clinic  Patient location: Off site     Jennifer Ramirez CNP  [facility]  Tel [phone]  Pager [phone]      Again, thank you for allowing me to participate in the care of your patient.        Sincerely,        Jennifer Ramirez NP  "

## 2024-10-21 NOTE — NURSING NOTE
"Kerri Timmons is a 64 year old female who presents for:  Chief Complaint   Patient presents with    RECHECK     laminectomy review results - Lvl 3 pain from her knee to her foot        Initial Vitals:  LMP  (LMP Unknown)  Estimated body mass index is 23.31 kg/m  as calculated from the following:    Height as of 1/23/24: 5' 3\" (1.6 m).    Weight as of 1/23/24: 131 lb 9.6 oz (59.7 kg).. There is no height or weight on file to calculate BSA. BP completed using cuff size: NA (Not Taken)  Data Unavailable    Nursing Comments: Calling her in [place], [phone].     Kaitlynn Prabhakar    "

## 2024-10-21 NOTE — PROGRESS NOTES
"The patient has been notified of following:     \"This telephone visit will be conducted via a call between you and your physician/provider. We have found that certain health care needs can be provided without the need for a physical exam.  This service lets us provide the care you need with a short phone conversation.  If a prescription is necessary we can send it directly to your pharmacy.  If lab work is needed we can place an order for that and you can then stop by our lab to have the test done at a later time.    If during the course of the call the physician/provider feels a telephone visit is not appropriate, you will not be charged for this service.\"     Physician/provider has received verbal consent for a Telephone Visit from the patient? Yes     [facility]surgery Clinic  Virtual Visit       HPI: Kerri Timmons is a 64 year old female who presents for a telephone visit to review MRI results. Patient is 1 year s/p left L5 and sacral laminotomies for resection of synovial cyst on 10/17/23 with Dr. Braxton. Patient's main concern is continued numbness in left calf, plantar foot, and last 3 toes. Symptoms have not worsened since previous clinic visit. She has intermittent \"spasms\" in her left calf that occurs with certain activities such as getting out of the bathtub or putting on her shoes. She reports intermittent low back pain that radiates to left buttocks, mostly occurs when working 12 hours shifts. Denies any weakness or new symptoms.     ROS: 10 point ROS neg other than the symptoms noted above in the HPI.    Imaging:   MR LUMBAR SPINE W/O & W CONTRAST  10/14/2024 7:20 AM                                         IMPRESSION: L5-S1 postsurgical changes, multilevel lumbar spondylosis,  as above similar to prior. No progressive degeneration since comparison.     Assessment:   64F presents 1 year s/p left L5 and sacral laminotomies for resection of synovial cyst on 10/17/23 with Dr. Braxton. Patient's main " "concern is continued numbness in left calf, plantar foot, and last 3 toes. She reports intermittent low back pain that radiates to left buttocks and occasional \"spasms\" in left calf. We reviewed recent lumbar spine MRI results and discussed next steps.     Plan:  -LLE EMG ordered for further evaluation  -Referral to [facility] for lumbar KRISTINA  -Continue with PT as tolerated  -Will call patient with EMG results and next steps    Patient voiced understanding and agreement.      Phone call duration: 27 minutes  Provider location: Clinic  Patient location: Off site     Jennifer Ramirez CNP  [facility] Neurosurgery  Tel [phone]  Pager [phone]    "

## 2024-10-21 NOTE — TELEPHONE ENCOUNTER
Patient has been reached, and will be available today for a phone visit with provider to review results of recent imaging.    Phone visit will be at 3:30p.    Scheduled per message from provider.

## 2024-10-23 ENCOUNTER — MYC REFILL (OUTPATIENT)
Dept: FAMILY MEDICINE | Facility: CLINIC | Age: 64
End: 2024-10-23
Payer: COMMERCIAL

## 2024-10-23 DIAGNOSIS — I10 HYPERTENSION GOAL BP (BLOOD PRESSURE) < 140/90: ICD-10-CM

## 2024-10-23 DIAGNOSIS — F33.1 MAJOR DEPRESSIVE DISORDER, RECURRENT EPISODE, MODERATE (H): ICD-10-CM

## 2024-10-23 DIAGNOSIS — B00.1 RECURRENT COLD SORES: ICD-10-CM

## 2024-10-23 DIAGNOSIS — I47.10 PAROXYSMAL SUPRAVENTRICULAR TACHYCARDIA (H): ICD-10-CM

## 2024-10-23 DIAGNOSIS — B00.1 COLD SORE: ICD-10-CM

## 2024-10-24 RX ORDER — VALACYCLOVIR HYDROCHLORIDE 1 G/1
2000 TABLET, FILM COATED ORAL 2 TIMES DAILY PRN
Qty: 20 TABLET | Status: SHIPPED | OUTPATIENT
Start: 2024-10-24

## 2024-10-24 RX ORDER — VALACYCLOVIR HYDROCHLORIDE 500 MG/1
500 TABLET, FILM COATED ORAL DAILY
Qty: 90 TABLET | Refills: 0 | Status: SHIPPED | OUTPATIENT
Start: 2024-10-24

## 2024-10-27 RX ORDER — LOSARTAN POTASSIUM 100 MG/1
100 TABLET ORAL DAILY
Qty: 90 TABLET | Refills: 3 | Status: SHIPPED | OUTPATIENT
Start: 2024-10-27

## 2024-10-27 RX ORDER — PENCICLOVIR 10 MG/G
CREAM TOPICAL
Qty: 5 G | Status: SHIPPED | OUTPATIENT
Start: 2024-10-27

## 2024-10-27 RX ORDER — DULOXETIN HYDROCHLORIDE 60 MG/1
60 CAPSULE, DELAYED RELEASE ORAL DAILY
Qty: 90 CAPSULE | Refills: 3 | Status: SHIPPED | OUTPATIENT
Start: 2024-10-27

## 2024-10-27 RX ORDER — VERAPAMIL HYDROCHLORIDE 240 MG/1
TABLET, FILM COATED, EXTENDED RELEASE ORAL
Qty: 90 TABLET | Refills: 3 | Status: SHIPPED | OUTPATIENT
Start: 2024-10-27

## 2024-10-28 ENCOUNTER — TELEPHONE (OUTPATIENT)
Dept: FAMILY MEDICINE | Facility: CLINIC | Age: 64
End: 2024-10-28
Payer: COMMERCIAL

## 2024-10-28 ENCOUNTER — TELEPHONE (OUTPATIENT)
Dept: PALLIATIVE MEDICINE | Facility: CLINIC | Age: 64
End: 2024-10-28
Payer: COMMERCIAL

## 2024-10-28 DIAGNOSIS — M54.16 LUMBAR RADICULOPATHY: Primary | ICD-10-CM

## 2024-10-28 NOTE — TELEPHONE ENCOUNTER
"Screening Questions for Radiology Injections:    Injection to be done at which interventional clinic site? [facility]    If choosing [facility] for location, please inform patient:  \"[facility] is a Hospital based clinic. Before your visit, you should check with your insurance about how it covers the charges for facility services in a hospital-based clinic.     Procedure ordered by Jennifer Ramirez NP in  NEUROSURGERY    Procedure ordered? LESI  Transforaminal Cervical KRISTINA - Send to [facility] - No [facility] providers perform this procedure    What insurance would patient like us to bill for this procedure? [facility]  IF SCHEDULING IN [place] PAIN OR SPINE PLEASE SCHEDULE AT LEAST 7-10 BUSINESS DAYS OUT SO A PA CAN BE OBTAINED  Worker's comp or MVA (motor vehicle accident) -Any injection DO NOT SCHEDULE and route to Mark Mckeon.    [employer] insurance - For ALL INJECTIONS DO NOT SCHEDULE and route to Bettie Butts.     ALL BCBS, [employer] and [employer] - DO NOT SCHEDULE and route to Bettie Butts  MEDICA- ALL INJECTIONS- route to Bettie Butts    Is patient scheduled at [facility]? NO    If YES, route every encounter to [facility] CARE NAVIGATION POOL [[phone]]    Is an  needed? No     Patient has a  home? (Review Grid) YES: Informed     Any chance of pregnancy? NO   If YES, do NOT schedule and route to RN pool  - Dr. Moon route to PM&R Nurse  [[id number]]      Is patient actively being treated for cancer or immunocompromised? No  If YES, do NOT schedule and route to RN pool/ Dr. Moon's Team    Does the patient have a bleeding or clotting disorder? No   If YES, okay to schedule AND route to RN nurse / Dr. Moon's Team   (For any patients with platelet count <100, RN must forward to provider)    Is patient taking any Blood Thinners OR Antiplatelet medication?  No   If hold needed, do NOT schedule, route to RN diomedes/ Dr. Moon's " Team  Examples:   Blood Thinners: (Coumadin, Warfarin, Jantoven, Pradaxa, Xarelto, Eliquis, Edoxaban, Enoxaparin, Lovenox, Heparin, Arixtra, Fondaparinux or Fragmin)  Antiplatelet Medications: (Plavix, Brilinta or Effient)     Is patient taking any aspirin products (includes Excedrin and Fiorinal)? No   If yes route to RN pool/ Dr. Moon's Team - Do not schedule    Is patient taking any GLP-1 Antagonist (hold needed for sedation patients only) No   (semaglutide (Ozempic, Wegovy), dulaglutide (Trulicity), exenatide ER (Bydureon), tirzepatide (Mounjaro), Liraglutide (Saxenda, Victoza), semaglutide (Rybelsus), Terzepatide (Zepbound)  If YES, okay to schedule AND route to RN  / Dr. Moon's Team      Any allergies to contrast dye, iodine, shellfish, or numbing and steroid medications? No  If YES, schedule and add allergy information to appointment notes AND route to the RN pool/ Dr. Moon's Team  If KRISTINA and Contrast Dye / Iodine Allergy? DO NOT SCHEDULE, route to RN pool/ Dr. Moon's Team  Allergies: No known drug allergy     Does patient have an active infection or treated for one within the past week? No  Is patient currently taking any antibiotics or steroid medications?  No   For patients on chronic, preventative, or prophylactic antibiotics, procedures may be scheduled.   For patients on antibiotics for active or recent infection, schedule 4 days after completed.  For patients on steroid medications, schedule 4 days after completed.     Has the patient had a flu shot or any other vaccinations within the past 7 days? No  If yes, explain that for the vaccine to work best they need to:     wait 1 week before and 1 week after getting any Vaccine  wait 1 week before and 2 weeks after getting any Covid Vaccine   If patient has concerns about the timing, send to RN pool/ Dr. Moon's Team    Does patient have an MRI/CT?  YES: 10/14/24 Include Date and Check Procedure Scheduling Grid to see if required.  Was the  MRI/CT done within the last 3 years?  Yes   If no route to RN Pool/ Dr. Moon's Team  If yes, where was the MRI/CT done? [facility]    Refer to PACS Transmissions list for approved external locations and route to RN Pool High Priority/ Dr. Youngers Team  If MRI was not done at approved external location do NOT schedule and route to RN pool/ Dr. Moon's Team    If patient has an imaging disc, the injection MAY be scheduled but patient must bring disc to appt or appt will be cancelled.    Is patient able to transfer to a procedure table with minimal or no assistance? Yes   If no, do NOT schedule and route to RN Pool/ Dr. Moon's Team    Procedure Specific Instructions:  If celiac plexus block, informed patient NPO for 6 hours and that it is okay to take medications with sips of water, especially blood pressure medications Not Applicable       If this is for a cervical procedure, informed patient that aspirin needs to be held for 6 days.   Not Applicable    Sedation, If Sedation is ordered for any procedure, patient must be NPO for 6 hours prior to procedure Not Applicable    If IV needed:  Do not schedule procedures requiring IV placement in the first appointment of the day or first appointment after lunch. Do NOT schedule at 0745, 0815 or 1245.     Instructed patient to arrive 30 minutes early for IV start if required. (Check Procedure Scheduling Grid)  Not Applicable    Reminders:  If you are started on any steroids or antibiotics between now and your appointment, you must contact us because the procedure may need to be cancelled.  Yes    As a reminder, receiving steroids can decrease your body's ability to fight infection.   Would you still like to move forward with scheduling the injection?  Yes    IV Sedation is not provided for procedures. If oral anti-anxiety medication is needed, the patient should request this from their referring provider.    Instruct patient to arrive as directed prior to the scheduled  appointment time:  If IV needed 30 minutes before appointment time     For patients 85 or older we recommend having an adult stay w/ them for the remainder of the day.     If the patient is Diabetic, remind them to bring their glucometer.      Does the patient have any questions?  NO  Lindsey Orona  [facility]

## 2024-11-04 NOTE — PROGRESS NOTES
[facility] - Procedure Note    Date of Visit: 11/6/2024    Procedure performed: Left Lumbar L4-5 interlaminar epidural steroid injection  Diagnosis: Lumbar spondylosis; Lumbar radiculitis/radiculopathy  : Geneva Peoples MD  Anesthesia: none    Indications: Kerri Timmons is a 64 year old female who is seen at the request of Jennifer Ramirez CNP for a lumbar epidural steroid injection. The patient describes left sided calf pain and numbness. The patient has been exhibiting symptoms consistent with lumbar intraspinal inflammation and radiculopathy. Symptoms have been persistent, disabling, and intermittently severe. The patient reports minimal improvement with conservative treatment, including PT and medications.    S/P Left L5 laminotomy and synovial cyst resection 10/17/2023 Dr. Braxton    MRI LUMBAR SPINE was done on 10/14/2024 which showed:   FINDINGS:  Five lumbar type vertebral body numbering convention. Preserved  vertebral body height, alignment and marrow signal. Conus medullaris  tip at L1. Normal cauda equina and nerve roots. Normal bony pelvis.  Nonfocal extraspinal structures.      On a level by level basis, the findings are as follows:     T12-L1: Preserved disc height and signal for patient age. Normal  facets for patient age. No high-grade spinal canal or neural foraminal  stenosis     L1-L2: Preserved disc height and signal for patient age. Normal facets  for patient age. No high-grade spinal canal or neural foraminal  stenosis.      L2-L3: Preserved disc height and signal for patient age. Normal facets  for patient age. No high-grade spinal canal or neural foraminal  stenosis.     L3-L4: Preserved disc height and signal for patient age. Similar  symmetric disc bulge. Normal facets for patient age. No high-grade  spinal canal or neural foraminal stenosis.     L4-L5: Preserved disc height and signal for patient age. Similar L4  inferior endplate and intervertebral disc  herniation. Similar  symmetric disc bulge, bilateral facet arthropathy which contributes to  mild bilateral neural foraminal stenosis and canal stenosis.     L5-S1: Preserved disc height and signal for patient age. Left  hemilaminectomy. Symmetric disc bulge, bilateral facet arthropathy  which continue to contribute to mild to moderate neural foraminal  stenosis. No high-grade canal stenosis.     Postcontrast images are notable for left laminectomy site, left  greater than right L5-S1 and bilateral relatively symmetric L4-L5  periarticular marrow and soft tissue postcontrast enhancement  compatible with postsurgical change and sequela of acute or chronic  osteoarthritis. Findings are not significantly changed since  comparison.                                                                      IMPRESSION: L5-S1 postsurgical changes, multilevel lumbar spondylosis,  as above similar to prior. No progressive degeneration since  comparison.    Allergies:      Allergies   Allergen Reactions    No Known Drug Allergy         Vitals:  /78   Pulse 68   LMP  (LMP Unknown)   SpO2 98%     Review of Systems: The patient denies recent fever, chills, illness, use of antibiotics or anticoagulants. All other 10-point review of systems negative.     Procedure: The procedure and risks were explained, and informed written consent was obtained from the patient. Risks include but are not limited to: infection, bleeding, increased pain, and damage to soft tissue, nerve, muscle, and vasculature structures. After getting informed consent, patient was brought into the procedure suite and was placed in a prone position on the procedure table. A Pause for the Cause was performed. Patient was prepped and draped in sterile fashion.     The L4-5 interspace was identified with use of fluoroscopy in AP view. A 25-gauge, 1.5 inch needle was used to anesthetize the skin and subcutaneous tissue entry site with a total of 2 ml of 1%  lidocaine. Under fluoroscopic visualization, a 20-gauge, 3.5 inch Tuohy epidural needle was slowly advanced towards the epidural space a few millimeters left of midline. The latter part of the needle advancement was guided with fluoroscopy in the lateral view. The epidural space was identified using loss of resistance technique. After negative aspiration for heme and cerebrospinal fluid, a total of 1 mL of non-ionic contrast was injected to confirm needle placement with 0 mL of contrast wasted. Epidurogram confirmed spread within the posterior epidural space. 2 ml of 40mg/ml of triamcinolone, 2 ml of 1% lidocaine, and 1 ml of preservative free saline was injected. The needle was removed.  Images were saved to PACS.    The patient tolerated the procedure well, and there was no evidence of procedural complications. No new sensory or motor deficits were noted following the procedure. The patient was stable and able to ambulate on discharge home. Post-procedure instructions were provided.     Pre-procedure pain score: 2/10 in the back, 2/10 in the leg  Post-procedure pain score: 4/10 in the back, 4/10 in the leg    Assessment/Plan: Kerri Timmons is a 64 year old female s/p lumbar interlaminar epidural steroid injection today for lumbar spondylosis and radiculitis/radiculopathy.     1. Following today's procedure, the patient was advised to contact the Pain Management Center for any of the following:   Fever, chills, or night sweats   New onset of pain, numbness, or weakness   Any questions/concerns regarding the procedure  If unable to contact the Pain Center, the patient was instructed to go to a local Emergency Room for any complications.   2. Follow-up with the referring provider in 2 weeks for post-procedure evaluation.    ERMIAS CARL MD   Pain Management

## 2024-11-06 ENCOUNTER — RADIOLOGY INJECTION OFFICE VISIT (OUTPATIENT)
Dept: PALLIATIVE MEDICINE | Facility: CLINIC | Age: 64
End: 2024-11-06
Attending: NURSE PRACTITIONER
Payer: COMMERCIAL

## 2024-11-06 VITALS — HEART RATE: 70 BPM | OXYGEN SATURATION: 98 % | SYSTOLIC BLOOD PRESSURE: 136 MMHG | DIASTOLIC BLOOD PRESSURE: 79 MMHG

## 2024-11-06 DIAGNOSIS — M54.16 LUMBAR RADICULOPATHY: Primary | ICD-10-CM

## 2024-11-06 DIAGNOSIS — Z98.890 S/P LAMINECTOMY: ICD-10-CM

## 2024-11-06 PROCEDURE — 62323 NJX INTERLAMINAR LMBR/SAC: CPT | Performed by: ANESTHESIOLOGY

## 2024-11-06 RX ORDER — TRIAMCINOLONE ACETONIDE 40 MG/ML
40 INJECTION, SUSPENSION INTRA-ARTICULAR; INTRAMUSCULAR ONCE
Status: COMPLETED | OUTPATIENT
Start: 2024-11-06 | End: 2024-11-06

## 2024-11-06 RX ADMIN — TRIAMCINOLONE ACETONIDE 40 MG: 40 INJECTION, SUSPENSION INTRA-ARTICULAR; INTRAMUSCULAR at 09:00

## 2024-11-06 ASSESSMENT — PAIN SCALES - GENERAL
PAINLEVEL_OUTOF10: MODERATE PAIN (4)
PAINLEVEL_OUTOF10: MILD PAIN (2)

## 2024-11-06 NOTE — NURSING NOTE
Discharge Information    IV Discontiued Time:  NA    Amount of Fluid Infused:  NA    Discharge Criteria = When patient returns to baseline or as per MD order    Consciousness:  Pt is fully awake    Circulation:  BP +/- 20% of pre-procedure level    Respiration:  Patient is able to breathe deeply    O2 Sat:  Patient is able to maintain O2 Sat >92% on room air    Activity:  Moves 4 extremities on command    Ambulation:  Patient is able to stand and walk or stand and pivot into wheelchair    Dressing:  Clean/dry or No Dressing    Notes:   Discharge instructions and AVS given to patient    Patient meets criteria for discharge?  YES    Admitted to PCU?  No    Responsible adult present to accompany patient home?  Yes    Signature/Title:    Sadie Benjamin RN  RN Care Coordinator  [facility]

## 2024-11-06 NOTE — TELEPHONE ENCOUNTER
Prior Authorization Not Allowed per Insurance, Medication Exclusion From Patients Benefit Plan        Medication: penciclovir (DENAVIR) 1 % external cream -PA NOT ALLOWED/DENIED     Insurance Company: [employer] - Phone [phone] Fax [phone]  Expected CoPay:    $650  [id number] [place] [id number] [employer] [place] [address]  Pharmacy Notified:  Yes  Patient Notified:  No    Insurance states that medication is not covered and Medication Exclusion From patients Benefit Plan. Excluded Medication, NO PA and Appeal available for Review. Notify filling pharmacy on medication not covered and Excluded from patients Benefit Plan. Requested Pharmacy to notify the patient on Medication not covered and Exclusion from Benefit Plan.                
Prior Authorization Retail Medication Request    Medication/Dose: penciclovir (DENAVIR) 1 % external cream     Diagnosis and ICD code (if different than what is on RX):  Cold sore [B00.1]     Insurance   Primary: [facility] CHOICE   Insurance ID:  [id number]     Pharmacy Information (if different than what is on RX)  [facility]:  [id number] [place], [place] - [address]  Phone:  [phone]   Fax:[phone]      
Writer responded via [employer].    Sofía Lopez, BSN RN  [facility]    
,DirectAddress_Unknown

## 2024-11-06 NOTE — NURSING NOTE
Pre-procedure Intake  If YES to any questions or NO to having a   Please complete laminated checklist and leave on the computer keyboard for Provider, verbally inform provider if able.    For SCS Trial, RFA's or any sedation procedure:  Have you been fasting? NA  If yes, for how long?     Are you taking any any blood thinners such as Coumadin, Warfarin, Jantoven, Pradaxa Xarelto, Eliquis, Edoxaban, Enoxaparin, Lovenox, Heparin, Arixtra, Fondaparinux, or Fragmin? OR Antiplatelet medication such as Plavix, Brilinta, or Effient?   No   If yes, when did you take your last dose?     Do you take aspirin?  No  If cervical procedure, have you held aspirin for 6 days?   NA    Is the Pt taking any GLP-1 Antagonist (hold needed for sedation patients only)  (semaglutide (Ozempic, Wegovy), dulaglutide (Trulicity), exenatide ER (Bydureon), tirzepatide (Mounjaro), Liraglutide (Saxenda, Victoza), semaglutide (Rybelsus)     NA  If yes, when did you take your last dose?     Do you have any allergies to contrast dye, iodine, steroid and/or numbing medications?  NO    Are you currently taking antibiotics or have an active infection?  NO    Have you had a fever/elevated temperature within the past week? NO    Are you currently taking oral steroids? NO    Do you have a ? Yes    Are you pregnant or breastfeeding?  Not Applicable    Have you received any vaccinations in the last week? NO    Notify provider and RNs if systolic BP >170, diastolic BP >100, P >100 or O2 sats < 90%      Meaghan Charles MA  [facility]

## 2024-11-06 NOTE — PATIENT INSTRUCTIONS
[facility] Procedure Discharge Instructions    Today you saw:   Dr. Geneva Peoples     Your procedure:  Lumbar Interlaminar epidural steroid injection       Medications used:  Lidocaine (anesthetic)  Kenalog (steroid)  Omnipaque (contrast)        If you were holding your blood thinning medication, please restart taking it: N/A        Be cautious when walking as numbness and/or weakness in the legs may occur up to 6-8 hours after the procedure due to effect of the local anesthetic  Take caution when driving, the effect of the local anesthetic could slow your reflexes.   Avoid strenuous activity for the first 24 hours. You may resume your regular activities after that.   You may shower, however avoid swimming, tub baths or hot tubs for 24 hours following your procedure  You may have a mild to moderate increase in pain for several days following the injection.    You may use ice packs for 10-15 minutes, 3 to 4 times a day at the injection site for comfort  Do not use heat to painful areas for 6 to 8 hours. This will give the local anesthetic time to wear off and prevent you from accidentally burning your skin.  Unless you have been directed to avoid the use of anti-inflammatory medications (NSAIDS-ibuprofen, Aleve, Motrin), you may use these medications or Tylenol for pain control if needed.   With diabetes, check your blood sugar more frequently than usual as your blood sugar may be higher than normal for 10-14 days following a steroid injection. Contact your doctor who manages your diabetes if your blood sugar is higher than usual  Possible side effects of steroids that you may experience include flushing, elevated blood pressure, increased appetite, mild headaches and restlessness.  All of these symptoms will get better with time.  It may take up to 14 days for the steroid medication to start working although you may feel the effect as early as a few days after the procedure.   Follow up with your  referring provider in 2-3 weeks    If you experience any of the following, call the pain center line during work hours at [phone] or on-call physician after hours at [phone]:  -Fever over 100 degree F  -Swelling, bleeding, redness, drainage, warmth at the injection site  -Progressive weakness or numbness in your legs or arms  -Loss of bowel or bladder function  -Unusual headache that is not relieved by Tylenol or your regular headache medication  -Unusual new onset of pain that is not improving

## 2024-11-12 ENCOUNTER — OFFICE VISIT (OUTPATIENT)
Dept: AUDIOLOGY | Facility: CLINIC | Age: 64
End: 2024-11-12
Payer: COMMERCIAL

## 2024-11-12 DIAGNOSIS — H90.3 SENSORINEURAL HEARING LOSS, BILATERAL: Primary | ICD-10-CM

## 2024-11-12 PROCEDURE — 92557 COMPREHENSIVE HEARING TEST: CPT | Performed by: AUDIOLOGIST

## 2024-11-12 PROCEDURE — 92567 TYMPANOMETRY: CPT | Performed by: AUDIOLOGIST

## 2024-11-12 NOTE — PROGRESS NOTES
AUDIOLOGY REPORT    SUBJECTIVE:  Kerri Timmons is a 64 year old female who was seen in the Audiology Clinic at the [facility] for audiologic evaluation, referred by self. The patient reports gradually worsening hearing. She has started using captions on the TV. The patient notes that others have told her that she talks loudly, though she is unsure if this has changed over time. The patient reports that she occasionally had temporary tinnitus after loud concerts in the past, but she does not have tinnitus currently. She reports that she has not had any other significant noise exposure. The patient reports a history of ear infections as a child, but has not had ear surgery or any other ear problems. She does not have ear pain or pressure. The patient reports a family history of hearing loss, likely due to age. The patient was unaccompanied to today's appointment.    OBJECTIVE:  Otoscopic exam indicates ears are clear of cerumen bilaterally     Pure Tone Thresholds assessed using conventional audiometry with good  reliability from 250-8000 Hz bilaterally using insert earphones and circumaural headphones     RIGHT:  mild sensorineural hearing loss at 7551-3871 and 8000 Hz with normal hearing sensitivity at all other tested frequencies    LEFT:    mild to moderate sensorineural hearing loss at 3000 and 8000 Hz with normal hearing sensitivity at all other tested frequencies    Tympanogram:    RIGHT: normal eardrum mobility    LEFT:   normal eardrum mobility    Reflexes (reported by stimulus ear):  Could not seal    Speech Reception Threshold:    RIGHT: 10 dB HL    LEFT:   15 dB HL    Word Recognition Score:     RIGHT: 100% at 55 dB HL using NU-6 recorded word list.    LEFT:   96% at 55 dB HL using NU-6 recorded word list.      ASSESSMENT:     ICD-10-CM    1. Sensorineural hearing loss, bilateral  H90.3 Cmprhn Audiometry Thrshld Eval & Speech Recog (77844)     Tympanometry (impedance - testing) (51905)           Today s results were discussed with the patient in detail.     PLAN:  Patient was counseled regarding hearing loss and impact on communication.  Patient is not a good candidate for amplification at this time. It is recommended that the patient return as needed.  Please call this clinic with questions regarding these results or recommendations.      Tori Thurston, CCC-A  [place] Licensed Audiologist #[id number]  11/12/2024

## 2024-12-19 ENCOUNTER — VIRTUAL VISIT (OUTPATIENT)
Dept: FAMILY MEDICINE | Facility: CLINIC | Age: 64
End: 2024-12-19
Payer: COMMERCIAL

## 2024-12-19 DIAGNOSIS — R05.1 ACUTE COUGH: ICD-10-CM

## 2024-12-19 DIAGNOSIS — J01.90 ACUTE NON-RECURRENT SINUSITIS, UNSPECIFIED LOCATION: ICD-10-CM

## 2024-12-19 DIAGNOSIS — J20.9 ACUTE BRONCHITIS, UNSPECIFIED ORGANISM: Primary | ICD-10-CM

## 2024-12-19 RX ORDER — BENZONATATE 100 MG/1
100 CAPSULE ORAL 3 TIMES DAILY PRN
Qty: 20 CAPSULE | Refills: 0 | Status: SHIPPED | OUTPATIENT
Start: 2024-12-19

## 2024-12-19 ASSESSMENT — PATIENT HEALTH QUESTIONNAIRE - PHQ9
10. IF YOU CHECKED OFF ANY PROBLEMS, HOW DIFFICULT HAVE THESE PROBLEMS MADE IT FOR YOU TO DO YOUR WORK, TAKE CARE OF THINGS AT HOME, OR GET ALONG WITH OTHER PEOPLE: SOMEWHAT DIFFICULT
SUM OF ALL RESPONSES TO PHQ QUESTIONS 1-9: 2
SUM OF ALL RESPONSES TO PHQ QUESTIONS 1-9: 2

## 2024-12-19 ASSESSMENT — ENCOUNTER SYMPTOMS: COUGH: 1

## 2024-12-19 NOTE — PROGRESS NOTES
Kerri is a 64 year old who is being evaluated via a billable telephone visit.    What phone number would you like to be contacted at? [phone]  How would you like to obtain your AVS? [place]  Originating Location (pt. Location): Home  Distant Location (provider location):  On-site    Assessment & Plan     Acute cough  Acute bronchitis, unspecified organism  Acute non-recurrent sinusitis, unspecified location  Symptoms x 7 days now, not improving. Out of the window for covid testing. Bad cough, chest congestion, mucus, head/sinus congestion. Elected to treat suspected sinusitis with augmentin x 5 days. Tessalon pearls and OTC robitussin/delsym for cough. OTC options listed in AVS. Return if no improvement.   - amoxicillin-clavulanate (AUGMENTIN) 875-125 MG tablet  Dispense: 10 tablet; Refill: 0  - benzonatate (TESSALON) 100 MG capsule  Dispense: 20 capsule; Refill: 0      Subjective   Kerri is a 64 year old, presenting for the following health issues:  Cough        12/19/2024     9:01 AM   Additional Questions   Roomed by Ani PRIETO     History of Present Illness       Reason for visit:  Cough  Symptom onset:  3-7 days ago  Symptoms include:  Cough  Symptom intensity:  Moderate  Symptom progression:  Worsening  Had these symptoms before:  Yes  Has tried/received treatment for these symptoms:  Yes  Previous treatment was successful:  Yes  Prior treatment description:  Cough drops, nyquil  What makes it worse:  Laying down  What makes it better:  Medicine   She is taking medications regularly.     3 weeks ago, got a chest cold, then laryngitis  Improved after a few days  1+ week ago, got a cough x 1 week  Won't go away  Went straight to her chest  Coughing up green phlegm  Fatigued  SOB  Coughing up phlegm  No home covid test  Tried dayquil, lozenges      Objective    Vitals - Patient Reported  Pain Score: No Pain (0)    Vitals:  No vitals were obtained today due to virtual visit.    Physical Exam   General: Alert  and no distress; fatigued //Respiratory: cough heard // Psychiatric:  Appropriate affect, tone, and pace of words        Phone call duration: 5 minutes  Signed Electronically by: Austen Mills DO

## 2024-12-19 NOTE — PATIENT INSTRUCTIONS
Try mucinex or robitussin to help cough up mucus  Try delsym at night as a cough suppressant  Use tessalon pearls 3x/day as needed for cough  Start augmentin 2x/day for 5 days  Follow up in person if not feeling better or worsening      For symptom relief I suggest tryin. Steam.  Take a long, hot shower.  Or if you don't want to get in the shower just run it with the bathroom door shut for a few minutes and breathe the steam.  2. Drink hot liquids frequently such as tea or hot water with honey and lemon.  3. Acetaminophen (Tylenol) and ibuprofen (Motrin or Advil) as needed for headache, sore throat, body aches, or fever.  4. For loosening phlegm and sputum try guaifenesin which is available alone as plain Robitussin or plain Mucinex.  5.  For cough suppression try dextromethorphan (DM) which is available as a plain syrup (Delsym). Or try Maru's Natural (honey-based) cough remedies.   6. For nasal congestion try:  An oral decongestant.  The only decongestant I recommend is pseudoephedrine. Ask the pharmacist for the over the counter (but real) pseudoephedrine - not phenylephrine.  This can raise your blood pressure and heart rate so do not use this if you have hypertension.    Afrin spray for up to 3 days.  This can also raise your blood pressure and heart rate so do not use this if you have hypertension.  (And never use afrin nasal spray for more than 3 days as there is a risk of developing tolerance and rebound/worsening nasal congestion if used longer than this.)  Nealmed sinus rinses.  Nasal steroid spray such as nasacort or flonase, which are over-the-counter.  7. And most importantly: plenty of rest and sleep

## (undated) DEVICE — SNARE CAPIVATOR ROUND COLD SNR BX10 M00561101

## (undated) DEVICE — TOOL DISSECT MIDAS MR8 14CM MATCH HEAD 3MM MR8-14MH30

## (undated) DEVICE — ESU ELEC BLADE 6" COATED/INSULATED E1455-6

## (undated) DEVICE — GOWN IMPERVIOUS 2XL BLUE

## (undated) DEVICE — DRAPE MAYO STAND 23X54 8337

## (undated) DEVICE — SOL WATER IRRIG 500ML BOTTLE 2F7113

## (undated) DEVICE — ESU PENCIL W/HOLSTER E2350H

## (undated) DEVICE — IOM SUPPLIES

## (undated) DEVICE — TUBING SUCTION 12"X1/4" N612

## (undated) DEVICE — PACK CATARACT CUSTOM ASC SEY15CPUMC

## (undated) DEVICE — EYE CANN IRR 25GA CYSTOTOME 581610

## (undated) DEVICE — IONM UP TO 7 HOURS

## (undated) DEVICE — LINEN TOWEL PACK X5 5464

## (undated) DEVICE — POSITIONER PT PRONESAFE HEAD REST W/DERMAPROX INSERT 40599

## (undated) DEVICE — SU ETHILON 10-0 CS160-6 12" 9000G

## (undated) DEVICE — KIT ENDO FIRST STEP DISINFECTANT 200ML W/POUCH EP-4

## (undated) DEVICE — ADH LIQUID MASTISOL TOPICAL VIAL 2-3ML 0523-48

## (undated) DEVICE — EYE PACK CUSTOM ANTERIOR 30DEG TIP CENTURION PPK6682-04

## (undated) DEVICE — SU VICRYL 2-0 CT-2 CR 8X18" J726D

## (undated) DEVICE — ENDO FORCEP SPIKED SERRATED SHAFT JUMBO 239CM G56998

## (undated) DEVICE — GLOVE BIOGEL PI MICRO SZ 7.5 48575

## (undated) DEVICE — ESU ELEC BLADE 2.75" COATED/INSULATED E1455

## (undated) DEVICE — SUCTION MANIFOLD NEPTUNE 2 SYS 1 PORT 702-025-000

## (undated) DEVICE — EYE KNIFE STILETTO VISITEC 1.1MM ANG 45DEG SIDEPORT 376620

## (undated) DEVICE — GLOVE BIOGEL PI MICRO INDICATOR UNDERGLOVE SZ 8.0 48980

## (undated) DEVICE — SPECIMEN CONTAINER 3OZ W/FORMALIN 59901

## (undated) DEVICE — NDL SPINAL 18GA 3.5" 405184

## (undated) DEVICE — SU MONOCRYL 4-0 PS-2 18" UND Y496G

## (undated) DEVICE — ESU GROUND PAD UNIVERSAL W/O CORD

## (undated) DEVICE — RX SURGIFLO HEMOSTATIC MATRIX W/THROMBIN 8ML 2994

## (undated) DEVICE — SPONGE SURGIFOAM 50

## (undated) DEVICE — KIT ENDO TURNOVER/PROCEDURE CARRY-ON 101822

## (undated) DEVICE — DRAPE COVER C-ARM SEAMLESS SNAP-KAP 03-KP26 LATEX FREE

## (undated) DEVICE — DRAPE MICROSCOPE LEICA 54X150" AR8033650

## (undated) DEVICE — EYE KNIFE SLIT XSTAR VISITEC 2.6MM 45DEG 373726

## (undated) DEVICE — EYE TIP IRRIGATION & ASPIRATION POLYMER 35D BENT 8065751511

## (undated) DEVICE — EYE SHIELD PLASTIC

## (undated) DEVICE — SU VICRYL 0 CT-2 CR 8X18" J727D

## (undated) DEVICE — SYR EAR BULB 3OZ 0035830

## (undated) DEVICE — TUBING SUCTION SOFT 20'X3/16" 0036570

## (undated) DEVICE — PACK SPINE SM CUSTOM SNE15SSFSK

## (undated) DEVICE — GLOVE PROTEXIS MICRO 8.0  2D73PM80

## (undated) DEVICE — EYE CANN IRR 30GA  ANTERIOR CHAMBER 581273

## (undated) RX ORDER — ONDANSETRON 2 MG/ML
INJECTION INTRAMUSCULAR; INTRAVENOUS
Status: DISPENSED
Start: 2023-10-17

## (undated) RX ORDER — FENTANYL CITRATE 50 UG/ML
INJECTION, SOLUTION INTRAMUSCULAR; INTRAVENOUS
Status: DISPENSED
Start: 2018-08-22

## (undated) RX ORDER — ACETAMINOPHEN 325 MG/1
TABLET ORAL
Status: DISPENSED
Start: 2018-08-22

## (undated) RX ORDER — FENTANYL CITRATE 0.05 MG/ML
INJECTION, SOLUTION INTRAMUSCULAR; INTRAVENOUS
Status: DISPENSED
Start: 2023-10-17

## (undated) RX ORDER — GLYCOPYRROLATE 0.2 MG/ML
INJECTION, SOLUTION INTRAMUSCULAR; INTRAVENOUS
Status: DISPENSED
Start: 2023-10-17

## (undated) RX ORDER — OXYCODONE HYDROCHLORIDE 5 MG/1
TABLET ORAL
Status: DISPENSED
Start: 2023-10-17

## (undated) RX ORDER — METHYLPREDNISOLONE ACETATE 40 MG/ML
INJECTION, SUSPENSION INTRA-ARTICULAR; INTRALESIONAL; INTRAMUSCULAR; SOFT TISSUE
Status: DISPENSED
Start: 2023-10-17

## (undated) RX ORDER — ACETAMINOPHEN 325 MG/1
TABLET ORAL
Status: DISPENSED
Start: 2023-10-17

## (undated) RX ORDER — FENTANYL CITRATE 50 UG/ML
INJECTION, SOLUTION INTRAMUSCULAR; INTRAVENOUS
Status: DISPENSED
Start: 2021-07-29

## (undated) RX ORDER — BUPIVACAINE HYDROCHLORIDE AND EPINEPHRINE 5; 5 MG/ML; UG/ML
INJECTION, SOLUTION EPIDURAL; INTRACAUDAL; PERINEURAL
Status: DISPENSED
Start: 2023-10-17

## (undated) RX ORDER — PROPOFOL 10 MG/ML
INJECTION, EMULSION INTRAVENOUS
Status: DISPENSED
Start: 2023-10-17

## (undated) RX ORDER — GABAPENTIN 300 MG/1
CAPSULE ORAL
Status: DISPENSED
Start: 2023-10-17

## (undated) RX ORDER — FENTANYL CITRATE 50 UG/ML
INJECTION, SOLUTION INTRAMUSCULAR; INTRAVENOUS
Status: DISPENSED
Start: 2023-10-17

## (undated) RX ORDER — ONDANSETRON 2 MG/ML
INJECTION INTRAMUSCULAR; INTRAVENOUS
Status: DISPENSED
Start: 2018-08-22